# Patient Record
Sex: FEMALE | Race: WHITE | NOT HISPANIC OR LATINO | Employment: PART TIME | RURAL
[De-identification: names, ages, dates, MRNs, and addresses within clinical notes are randomized per-mention and may not be internally consistent; named-entity substitution may affect disease eponyms.]

---

## 2017-11-10 ENCOUNTER — OFFICE VISIT (OUTPATIENT)
Dept: OTOLARYNGOLOGY | Facility: CLINIC | Age: 62
End: 2017-11-10

## 2017-11-10 VITALS — WEIGHT: 130 LBS | HEIGHT: 63 IN | BODY MASS INDEX: 23.04 KG/M2

## 2017-11-10 DIAGNOSIS — J31.0 CHRONIC RHINITIS, UNSPECIFIED TYPE: ICD-10-CM

## 2017-11-10 DIAGNOSIS — Z48.813 AFTERCARE FOLLOWING SURGERY OF THE RESPIRATORY SYSTEM: ICD-10-CM

## 2017-11-10 DIAGNOSIS — G44.221 CHRONIC TENSION-TYPE HEADACHE, INTRACTABLE: Primary | ICD-10-CM

## 2017-11-10 PROCEDURE — 99203 OFFICE O/P NEW LOW 30 MIN: CPT | Performed by: OTOLARYNGOLOGY

## 2017-11-10 PROCEDURE — 31233 NSL/SINS NDSC DX MAX SINUSC: CPT | Performed by: OTOLARYNGOLOGY

## 2017-11-10 RX ORDER — DULOXETIN HYDROCHLORIDE 30 MG/1
CAPSULE, DELAYED RELEASE ORAL
COMMUNITY
Start: 2017-08-20 | End: 2019-04-10

## 2017-11-10 RX ORDER — GABAPENTIN 800 MG/1
800 TABLET ORAL 3 TIMES DAILY
COMMUNITY
Start: 2017-11-09 | End: 2019-04-10

## 2017-11-10 RX ORDER — CONJUGATED ESTROGENS 0.62 MG/1
0.62 TABLET, FILM COATED ORAL DAILY
COMMUNITY
Start: 2017-11-09 | End: 2019-04-19 | Stop reason: SDUPTHER

## 2017-11-10 RX ORDER — OMEPRAZOLE 20 MG/1
CAPSULE, DELAYED RELEASE ORAL
COMMUNITY
Start: 2017-08-05 | End: 2018-02-21 | Stop reason: SDDI

## 2017-11-10 RX ORDER — ZOLPIDEM TARTRATE 10 MG/1
10 TABLET ORAL NIGHTLY PRN
COMMUNITY
Start: 2017-11-09 | End: 2019-04-19

## 2017-11-10 RX ORDER — LEVOTHYROXINE SODIUM 0.12 MG/1
125 TABLET ORAL DAILY
COMMUNITY
Start: 2017-11-09 | End: 2019-04-10

## 2017-11-10 RX ORDER — FUROSEMIDE 20 MG/1
20 TABLET ORAL DAILY
COMMUNITY
Start: 2017-09-05 | End: 2021-06-21

## 2017-11-10 RX ORDER — HYDROCODONE BITARTRATE AND ACETAMINOPHEN 10; 325 MG/1; MG/1
1 TABLET ORAL EVERY 4 HOURS PRN
Status: ON HOLD | COMMUNITY
Start: 2017-11-06 | End: 2020-06-23 | Stop reason: SDUPTHER

## 2017-11-10 NOTE — PROGRESS NOTES
"Subjective   Kathleen Milligan is a 62 y.o. female.   This is a consultation from Dr. Cassidy thompson  History of Present Illness     Patient states that she received a steroid injection in her hip approximately one month ago and got \"sick\" after that.  She states she had nausea and diarrhea, but also developed a pressure-type headache that she says has been continuous for 1 month.  She says she has pressure in her head radiating from her forehead back to her occiput and also around her eyes.  Feels like she has postnasal drainage but no rhinorrhea.  Has a history of previous sinus surgery by Dr. Brown apparently in 2009.  Tried to use a medicated nasal spray that was prescribed by Dr. thompson but she states this gave her nose bleeds so she is not currently using any nasal medicine.  Additionally reports that when she wakes up in the morning she has significant swelling primarily on the right side of her face.  She says this will take most of the day to go down but will eventually resolved by the afternoon and evening only to recur the next morning.  Has had cervical spine surgery on the right, apparently    The following portions of the patient's history were reviewed and updated as appropriate: allergies, current medications, past family history, past medical history, past social history, past surgical history and problem list.      Kathleen Milligan reports that she has been smoking.  She has never used smokeless tobacco.  Patient is a tobacco user and has been counseled for use of tobacco products    Family History   Problem Relation Age of Onset   • Heart disease Mother    • Thyroid disease Mother    • Cancer Father    • Heart disease Father        No Known Allergies      Current Outpatient Prescriptions:   •  DULoxetine (CYMBALTA) 30 MG capsule, , Disp: , Rfl:   •  furosemide (LASIX) 20 MG tablet, , Disp: , Rfl:   •  gabapentin (NEURONTIN) 800 MG tablet, , Disp: , Rfl:   •  HYDROcodone-acetaminophen (NORCO)  " MG per tablet, , Disp: , Rfl:   •  levothyroxine (SYNTHROID, LEVOTHROID) 125 MCG tablet, , Disp: , Rfl:   •  omeprazole (priLOSEC) 20 MG capsule, , Disp: , Rfl:   •  PREMARIN 0.625 MG tablet, , Disp: , Rfl:   •  zolpidem (AMBIEN) 10 MG tablet, , Disp: , Rfl:     No past medical history on file.      Review of Systems   Constitutional: Positive for appetite change and unexpected weight change.   HENT: Positive for trouble swallowing.    Gastrointestinal: Positive for abdominal pain and diarrhea.   Endocrine: Positive for cold intolerance.        Hot flashes   Genitourinary:        Nocturia   Musculoskeletal: Positive for arthralgias and back pain.        Leg pain   Skin:        Hair loss   Neurological: Positive for weakness, numbness and headaches.   Psychiatric/Behavioral:        Depression   All other systems reviewed and are negative.          Objective   Physical Exam  General: Well-developed well-nourished female in no acute distress.  Alert and oriented ×3. Head: Normocephalic. Face: Symmetrical strength and appearance, specifically no evidence of asymmetric swelling or edema noted. PERRL. EOMI. Voice:Strong. Speech:Fluent  Ears: External ears no deformity, canals no discharge, tympanic membranes intact clear and mobile bilaterally.  Nose: Nares show no discharge mass polyp or purulence.  Boggy mucosa is present.  No gross external deformity.  Septum: Midline  Oral cavity: Lips and gums without lesions.  Tongue and floor of mouth without lesions.  Parotid and submandibular ducts unobstructed.  No mucosal lesions on the buccal mucosa or vestibule of the mouth.  Pharynx: No erythema exudate mass or ulcer  Neck: No lymphadenopathy.  No thyromegaly.  Trachea and larynx midline.  No masses in the parotid or submandibular glands.  Healed surgical scar on the right neck    Nasal endoscopy is performed.  Darius-Synephrine and Xylocaine are instilled the nares.  0° scope is passed into the nose.  Boggy mucosa is seen but  no discharge or purulence.  There is evidence of previous surgery in the area of the maxillary sinus bilaterally and the antrostomies are patent without evidence of discharge.  The ethmoid area/middle meatal cleft shows no evidence of polyp or purulence.  No masses in the nasopharynx.  No purulent secretions are seen anywhere in the nose.    Assessment/Plan   Penny was seen today for sinus problem.    Diagnoses and all orders for this visit:    Chronic tension-type headache, intractable    Chronic rhinitis, unspecified type    Aftercare following surgery of the respiratory system        Plan: I explained to the patient that I do not think her headaches and pressure are related to her sinuses.  This sounds more like tension headache.  I suspect the edema that she experiences in her face is just positional.  Medically I've advise using nasal saline spray frequently which should help any residual nasal symptoms she does have an of also advised elevating her head while sleeping to try to minimize edema.  I told her to discuss her ongoing headaches further with Dr. thompson to see what further workup she might want to pursue.  I'll see her again as needed.  My thanks to Dr. thompson for this consultation

## 2018-02-21 ENCOUNTER — OFFICE VISIT (OUTPATIENT)
Dept: OTOLARYNGOLOGY | Facility: CLINIC | Age: 63
End: 2018-02-21

## 2018-02-21 VITALS
BODY MASS INDEX: 20.91 KG/M2 | HEIGHT: 63 IN | WEIGHT: 118 LBS | TEMPERATURE: 98 F | RESPIRATION RATE: 20 BRPM | DIASTOLIC BLOOD PRESSURE: 60 MMHG | SYSTOLIC BLOOD PRESSURE: 113 MMHG | HEART RATE: 64 BPM

## 2018-02-21 DIAGNOSIS — Z72.0 TOBACCO ABUSE DISORDER: ICD-10-CM

## 2018-02-21 DIAGNOSIS — H57.819 BROW PTOSIS: Primary | ICD-10-CM

## 2018-02-21 PROCEDURE — 4004F PT TOBACCO SCREEN RCVD TLK: CPT | Performed by: OTOLARYNGOLOGY

## 2018-02-21 PROCEDURE — 99214 OFFICE O/P EST MOD 30 MIN: CPT | Performed by: OTOLARYNGOLOGY

## 2018-02-21 RX ORDER — DEXAMETHASONE SODIUM PHOSPHATE 10 MG/ML
10 INJECTION INTRAMUSCULAR; INTRAVENOUS ONCE
Status: CANCELLED | OUTPATIENT
Start: 2018-02-21 | End: 2018-02-21

## 2018-02-21 RX ORDER — CLINDAMYCIN HYDROCHLORIDE 150 MG/1
150 CAPSULE ORAL 3 TIMES DAILY
COMMUNITY
End: 2018-03-13 | Stop reason: HOSPADM

## 2018-02-21 NOTE — PROGRESS NOTES
Chief Complaint   Patient presents with   • Blepharitis     drooping brows     Kathleen Milligan is a  63 y.o. female who complains of her bilateral brows drooping for the last 4-5 years. She states symptoms occur all the time and are associated with daily activities, driving and reading. She states symptoms are severe enough that they are causing difficulty performing daily activities, progressively worsening and worse as the day progresses. She states there are aggravating factors including being tired. She states there are alleviating factors including manually lifting brows. She states constantly trying to elevate her eyebrows causes her headaches.     A visual field test has been performed. The results are available in the chart. Visual field testing demonstrates a 30 degree improvement on the right and a 30 degree improvement on the left with taping.     Past Medical History:   Diagnosis Date   • Headache    • Hypothyroidism      Past Surgical History:   Procedure Laterality Date   • BACK SURGERY     •  SECTION     • HYSTERECTOMY     • NECK SURGERY     • SHOULDER SURGERY     • SINUS SURGERY     • SINUS SURGERY     • SPINE SURGERY     • WRIST SURGERY Right      Family History   Problem Relation Age of Onset   • Heart disease Mother    • Thyroid disease Mother    • Cancer Father    • Heart disease Father      Social History   Substance Use Topics   • Smoking status: Current Every Day Smoker   • Smokeless tobacco: Never Used   • Alcohol use No     Allergies:  Review of patient's allergies indicates no known allergies.    Current Outpatient Prescriptions:   •  clindamycin (CLEOCIN) 150 MG capsule, Take 150 mg by mouth 3 (Three) Times a Day., Disp: , Rfl:   •  DULoxetine (CYMBALTA) 30 MG capsule, , Disp: , Rfl:   •  furosemide (LASIX) 20 MG tablet, , Disp: , Rfl:   •  gabapentin (NEURONTIN) 800 MG tablet, , Disp: , Rfl:   •  HYDROcodone-acetaminophen (NORCO)  MG per tablet, , Disp: , Rfl:   •   "levothyroxine (SYNTHROID, LEVOTHROID) 125 MCG tablet, , Disp: , Rfl:   •  PREMARIN 0.625 MG tablet, , Disp: , Rfl:   •  zolpidem (AMBIEN) 10 MG tablet, , Disp: , Rfl:     Review of Systems   Constitutional: Negative for activity change, appetite change, chills, fatigue, fever and unexpected weight change.   HENT: Negative for congestion, dental problem, facial swelling, nosebleeds, trouble swallowing and voice change.    Eyes: Negative for discharge and redness.   Skin: Negative for color change, pallor and rash.   Neurological: Positive for headaches.   Hematological: Negative for adenopathy. Does not bruise/bleed easily.   All other systems reviewed and are negative.      /60  Pulse 64  Temp 98 °F (36.7 °C)  Resp 20  Ht 160 cm (63\")  Wt 53.5 kg (118 lb)  BMI 20.9 kg/m2  Physical Exam   Constitutional  She appears well-developed and well-nourished. No distress.     Eyes  Pupils are equal, round, and reactive to light. System normal.     General -             Right: Right eye is negative for discharge, clear conjunctiva and clear sclerae. Right eye extraocular movements are normal.             Left: Left eye is negative for discharge, clear conjunctiva and clear sclerae. Left eye extraocular movements are normal.     Eyelids -        Right: The right eyelid has incomplete eyelid opening. Right upper eyelid fat is normal. Right upper eyelid skin is negative for excess skin and pseudoptosis.        Left: The left eyelid has incomplete eyelid opening. Left upper eyelid fat is normal. Left upper eyelid skin is negative for excess skin and pseudoptosis.   Measurements:   Right Left Normal Range   Palpebral fissure vertical (mm)   12-14   Palpebral fissure horizontal (mm)    28-30   Visible pretarsal skin (mm)   3-6   Lash line to supratarsal crease (mm)   8-10   Lateral canthus (mm)   2-4   Globe to SHARON distance (mm)   15-17   MRD upper lid margin overlap (mm)   1-2   MRD limbus diameter (mm)   10-11   MRD " mid-pupil to lid distance (mm) 1 1 4-5      Right Left   Levator excursion excellent (10-15 mm) excellent (10-15 mm)   Snap test (seconds)     Pinch test (mm)     Schirmer's test (mm)     Visual field defect superior superior           Pulmonary/Chest  Effort normal and breath sounds normal. No respiratory distress.     Skin  Skin is warm and dry. No rash noted. No pallor.     Psychiatric  She has a normal mood and affect. Her behavior is normal. Judgment and thought content normal.     Forehead and Brow  Right brow position is low for medial and low for lateral. Left brow position is low for medial and low for lateral.     Brows are completely below the orbital rim.  No significant dermatochalasia.  Levator excursion is 10mm, but MRD-1 = 1.0 mm bilaterally.    HEENT: NCAT.  External auricles normal without EAC discharge.  Nose: No rhinnorhea.    Neuro: CN II-XII intact bilaterally.      MS: Normal gait and station.            Penny was seen today for blepharitis.    Diagnoses and all orders for this visit:    Brow ptosis    Tobacco abuse disorder        Due to severity of symptoms and symptoms causing headaches, I have offered bilateral brow lift. We have discussed endoscopic brow lift vs direct brow lift. Patient would like to proceed with direct brow lift. Risks, benefits, alternatives, and complications were discussed. She understands that she will have scars above the   Brows.  She is okay with this.    Discussed risks, benefits, alternatives, and possible complications of excision of the blepharoplasty.  Risks include, but are not limited too: bleeding, infection, hematoma, recurrence, need for additional procedures, flap failure, cosmetic deformity. Patient understands risks and would like to proceed with surgery.       QUALITY MEASURES    Body Mass Index Screening and Follow-Up Plan  Body mass index is 20.9 kg/(m^2).  Patient's BMI is within normal parameters. No follow-up required.    Tobacco Use:  Screening and Cessation Intervention  Smoking status: Current Every Day Smoker                                                   Packs/day: 0.00      Years: 0.00      Smokeless status: Never Used                        Smoking cessation information given in after visit summary.      Return for Postoperatively.    Jesús Garza MD  02/21/18  11:18 AM

## 2018-02-21 NOTE — PATIENT INSTRUCTIONS
##### TOBACCO CESSATION #####   IF YOU SMOKE OR USE TOBACCO PLEASE READ THE FOLLOWING:    Why is smoking bad for me?  Smoking increases the risk of heart disease, lung disease, vascular disease, stroke, and cancer.     If you smoke, STOP!    If you would like more information on quitting smoking, please visit the [employer] website: [email or web address]   This link will provide additional resources including the QUIT line and the Beat the Pack support groups.     For more information:    Quit Now [place]  1-800-QUIT-NOW  [email or web address]

## 2018-03-08 ENCOUNTER — APPOINTMENT (OUTPATIENT)
Dept: PREADMISSION TESTING | Facility: HOSPITAL | Age: 63
End: 2018-03-08

## 2018-03-08 VITALS
SYSTOLIC BLOOD PRESSURE: 122 MMHG | OXYGEN SATURATION: 97 % | RESPIRATION RATE: 18 BRPM | WEIGHT: 120.15 LBS | DIASTOLIC BLOOD PRESSURE: 50 MMHG | BODY MASS INDEX: 21.29 KG/M2 | HEART RATE: 74 BPM | HEIGHT: 63 IN

## 2018-03-08 LAB
DEPRECATED RDW RBC AUTO: 39.9 FL (ref 40–54)
ERYTHROCYTE [DISTWIDTH] IN BLOOD BY AUTOMATED COUNT: 12.3 % (ref 12–15)
HCT VFR BLD AUTO: 39.3 % (ref 37–47)
HGB BLD-MCNC: 13.1 G/DL (ref 12–16)
MCH RBC QN AUTO: 29.4 PG (ref 28–32)
MCHC RBC AUTO-ENTMCNC: 33.3 G/DL (ref 33–36)
MCV RBC AUTO: 88.3 FL (ref 82–98)
PLATELET # BLD AUTO: 239 10*3/MM3 (ref 130–400)
PMV BLD AUTO: 12.2 FL (ref 6–12)
RBC # BLD AUTO: 4.45 10*6/MM3 (ref 4.2–5.4)
WBC NRBC COR # BLD: 11.32 10*3/MM3 (ref 4.8–10.8)

## 2018-03-08 PROCEDURE — 85027 COMPLETE CBC AUTOMATED: CPT | Performed by: OTOLARYNGOLOGY

## 2018-03-08 PROCEDURE — 36415 COLL VENOUS BLD VENIPUNCTURE: CPT

## 2018-03-08 PROCEDURE — 93010 ELECTROCARDIOGRAM REPORT: CPT | Performed by: INTERNAL MEDICINE

## 2018-03-08 PROCEDURE — 93005 ELECTROCARDIOGRAM TRACING: CPT

## 2018-03-08 RX ORDER — POTASSIUM GLUCONATE 595(99)MG
595 TABLET, EXTENDED RELEASE ORAL DAILY
COMMUNITY

## 2018-03-08 NOTE — DISCHARGE INSTRUCTIONS
DAY OF SURGERY INSTRUCTIONS        YOUR SURGEON: ***JONEL UNDERWOOD    PROCEDURE: ***BROW LIFT    DATE OF SURGERY: ***MARCH 13,2018    ARRIVAL TIME: AS DIRECTED BY OFFICE    DAY OF SURGERY TAKE ONLY THESE MEDICATIONS: ***NONE            BEFORE YOU COME TO THE HOSPITAL  (Pre-op instructions)  • Do not eat, drink, smoke or chew gum after midnight the night before surgery.  This also includes no mints.  • Morning of surgery take only the medicines you have been instructed with a sip of water unless otherwise instructed  by your physician.  • Do not shave, wear makeup or dark nail polish.  • Remove all jewelry including rings.  • Leave anything you consider valuable at home.  • Leave your suitcase in the car until after your surgery.  • Bring the following with you if applicable:  o Picture ID and insurance, Medicare or Medicaid cards  o Co-pay/deductible required by insurance (cash, check, credit card)  o Copy of advance directive, living will or power-of- documents if not brought to PAT  o CPAP or BIPAP mask and tubing  o Relaxation aids (MP3 player, book, magazine)  • On the day of surgery check in at registration located at the main entrance of the hospital.       Outpatient Surgery Guidelines, Adult  Outpatient procedures are those for which the person having the procedure is allowed to go home the same day as the procedure. Various procedures are done on an outpatient basis. You should follow some general guidelines if you will be having an outpatient procedure.  LET YOUR HEALTH CARE PROVIDER KNOW ABOUT:  · Any allergies you have.  · All medicines you are taking, including vitamins, herbs, eye drops, creams, and over-the-counter medicines.  · Previous problems you or members of your family have had with the use of anesthetics.  · Any blood disorders you have.  · Previous surgeries you have had.  · Medical conditions you have.  RISKS AND COMPLICATIONS  Your health care provider will discuss possible risks and  complications with you before surgery. Common risks and complications include:    · Problems due to the use of anesthetics.  · Blood loss and replacement (does not apply to minor surgical procedures).  · Temporary increase in pain due to surgery.  · Uncorrected pain or problems that the surgery was meant to correct.  · Infection.  · New damage.  BEFORE THE PROCEDURE  · Ask your health care provider about changing or stopping your regular medicines. You may need to stop taking certain medicines in the days or weeks before the procedure.  · Stop smoking at least 2 weeks before surgery. This lowers your risk for complications during and after surgery. Ask your health care provider for help with this if needed.  · Eat your usual meals and a light supper the day before surgery. Continue fluid intake. Do not drink alcohol.  · Do not eat or drink after midnight the night before your surgery.   · Arrange for someone to take you home and to stay with you for 24 hours after the procedure. Medicine given for your procedure may affect your ability to drive or to care for yourself.  · Call your health care provider's office if you develop an illness or problem that may prevent you from safely having your procedure.  AFTER THE PROCEDURE  After surgery, you will be taken to a recovery area, where your progress will be monitored. If there are no complications, you will be allowed to go home when you are awake, stable, and taking fluids well. You may have numbness around the surgical site. Healing will take some time. You will have tenderness at the surgical site and may have some swelling and bruising. You may also have some nausea.  HOME CARE INSTRUCTIONS  · Do not drive for 24 hours, or as directed by your health care provider. Do not drive while taking prescription pain medicines.  · Do not drink alcohol for 24 hours.  · Do not make important decisions or sign legal documents for 24 hours.  · You may resume a normal diet and  activities as directed.  · Do not lift anything heavier than 10 pounds (4.5 kg) or play contact sports until your health care provider says it is okay.  · Change your bandages (dressings) as directed.  · Only take over-the-counter or prescription medicines as directed by your health care provider.  · Follow up with your health care provider as directed.  SEEK MEDICAL CARE IF:  · You have increased bleeding (more than a small spot) from the surgical site.  · You have redness, swelling, or increasing pain in the wound.  · You see pus coming from the wound.  · You have a fever.  · You notice a bad smell coming from the wound or dressing.  · You feel lightheaded or faint.  · You develop a rash.  · You have trouble breathing.  · You develop allergies.  MAKE SURE YOU:  · Understand these instructions.  · Will watch your condition.  · Will get help right away if you are not doing well or get worse.     This information is not intended to replace advice given to you by your health care provider. Make sure you discuss any questions you have with your health care provider.     Document Released: 09/12/2002 Document Revised: 05/03/2016 Document Reviewed: 05/22/2014  [employer] Interactive Patient Education ©2016 [employer]       Fall Prevention in Hospitals, Adult  As a hospital patient, your condition and the treatments you receive can increase your risk for falls. Some additional risk factors for falls in a hospital include:  · Being in an unfamiliar environment.  · Being on bed rest.  · Your surgery.  · Taking certain medicines.  · Your tubing requirements, such as intravenous (IV) therapy or catheters.  It is important that you learn how to decrease fall risks while at the hospital. Below are important tips that can help prevent falls.  SAFETY TIPS FOR PREVENTING FALLS  Talk about your risk of falling.  · Ask your health care provider why you are at risk for falling. Is it your medicine, illness, tubing placement, or  something else?  · Make a plan with your health care provider to keep you safe from falls.  · Ask your health care provider or pharmacist about side effects of your medicines. Some medicines can make you dizzy or affect your coordination.  Ask for help.  · Ask for help before getting out of bed. You may need to press your call button.  · Ask for assistance in getting safely to the toilet.  · Ask for a walker or cane to be put at your bedside. Ask that most of the side rails on your bed be placed up before your health care provider leaves the room.  · Ask family or friends to sit with you.  · Ask for things that are out of your reach, such as your glasses, hearing aids, telephone, bedside table, or call button.  Follow these tips to avoid falling:  · Stay lying or seated, rather than standing, while waiting for help.  · Wear rubber-soled slippers or shoes whenever you walk in the hospital.  · Avoid quick, sudden movements.  ¨ Change positions slowly.  ¨ Sit on the side of your bed before standing.  ¨ Stand up slowly and wait before you start to walk.  · Let your health care provider know if there is a spill on the floor.  · Pay careful attention to the medical equipment, electrical cords, and tubes around you.  · When you need help, use your call button by your bed or in the bathroom. Wait for one of your health care providers to help you.  · If you feel dizzy or unsure of your footing, return to bed and wait for assistance.  · Avoid being distracted by the TV, telephone, or another person in your room.  · Do not lean or support yourself on rolling objects, such as IV poles or bedside tables.     This information is not intended to replace advice given to you by your health care provider. Make sure you discuss any questions you have with your health care provider.     Document Released: 12/15/2001 Document Revised: 01/08/2016 Document Reviewed: 08/25/2013  [employer]vier Interactive Patient Education ©2016 [employer]       Surgical Site Infections FAQs  What is a Surgical Site Infection (SSI)?  A surgical site infection is an infection that occurs after surgery in the part of the body where the surgery took place. Most patients who have surgery do not develop an infection. However, infections develop in about 1 to 3 out of every 100 patients who have surgery.  Some of the common symptoms of a surgical site infection are:  · Redness and pain around the area where you had surgery  · Drainage of cloudy fluid from your surgical wound  · Fever  Can SSIs be treated?  Yes. Most surgical site infections can be treated with antibiotics. The antibiotic given to you depends on the bacteria (germs) causing the infection. Sometimes patients with SSIs also need another surgery to treat the infection.  What are some of the things that hospitals are doing to prevent SSIs?  To prevent SSIs, doctors, nurses, and other healthcare providers:  · Clean their hands and arms up to their elbows with an antiseptic agent just before the surgery.  · Clean their hands with soap and water or an alcohol-based hand rub before and after caring for each patient.  · May remove some of your hair immediately before your surgery using electric clippers if the hair is in the same area where the procedure will occur. They should not shave you with a razor.  · Wear special hair covers, masks, gowns, and gloves during surgery to keep the surgery area clean.  · Give you antibiotics before your surgery starts. In most cases, you should get antibiotics within 60 minutes before the surgery starts and the antibiotics should be stopped within 24 hours after surgery.  · Clean the skin at the site of your surgery with a special soap that kills germs.  What can I do to help prevent SSIs?  Before your surgery:  · Tell your doctor about other medical problems you may have. Health problems such as allergies, diabetes, and obesity could affect your surgery and your  treatment.  · Quit smoking. Patients who smoke get more infections. Talk to your doctor about how you can quit before your surgery.  · Do not shave near where you will have surgery. Shaving with a razor can irritate your skin and make it easier to develop an infection.  At the time of your surgery:  · Speak up if someone tries to shave you with a razor before surgery. Ask why you need to be shaved and talk with your surgeon if you have any concerns.  · Ask if you will get antibiotics before surgery.  After your surgery:  · Make sure that your healthcare providers clean their hands before examining you, either with soap and water or an alcohol-based hand rub.  · If you do not see your providers clean their hands, please ask them to do so.  · Family and friends who visit you should not touch the surgical wound or dressings.  · Family and friends should clean their hands with soap and water or an alcohol-based hand rub before and after visiting you. If you do not see them clean their hands, ask them to clean their hands.  What do I need to do when I go home from the hospital?  · Before you go home, your doctor or nurse should explain everything you need to know about taking care of your wound. Make sure you understand how to care for your wound before you leave the hospital.  · Always clean your hands before and after caring for your wound.  · Before you go home, make sure you know who to contact if you have questions or problems after you get home.  · If you have any symptoms of an infection, such as redness and pain at the surgery site, drainage, or fever, call your doctor immediately.  If you have additional questions, please ask your doctor or nurse.  Developed and co-sponsored by The Society for Healthcare Epidemiology of [place] (SHEA); Infectious Diseases Society of [place] (IDSA); American Hospital Association; Association for Professionals in Infection Control and Epidemiology (APIC); Centers for Disease  Control and Prevention (CDC); and The Joint Commission.     This information is not intended to replace advice given to you by your health care provider. Make sure you discuss any questions you have with your health care provider.     Document Released: 12/23/2014 Document Revised: 01/08/2016 Document Reviewed: 03/02/2016  "[employer]" Interactive Patient Education ©2016 "[employer].     PATIENT/FAMILY/RESPONSIBLE PARTY VERBALIZES UNDERSTANDING OF ABOVE EDUCATION.  COPY OF PAIN SCALE GIVEN AND REVIEWED WITH VERBALIZED UNDERSTANDING.

## 2018-03-13 ENCOUNTER — HOSPITAL ENCOUNTER (OUTPATIENT)
Facility: HOSPITAL | Age: 63
Setting detail: HOSPITAL OUTPATIENT SURGERY
Discharge: HOME | End: 2018-03-13
Attending: OTOLARYNGOLOGY | Admitting: OTOLARYNGOLOGY

## 2018-03-13 ENCOUNTER — ANESTHESIA (OUTPATIENT)
Dept: PERIOP | Facility: HOSPITAL | Age: 63
End: 2018-03-13

## 2018-03-13 ENCOUNTER — ANESTHESIA EVENT (OUTPATIENT)
Dept: PERIOP | Facility: HOSPITAL | Age: 63
End: 2018-03-13

## 2018-03-13 VITALS
DIASTOLIC BLOOD PRESSURE: 46 MMHG | SYSTOLIC BLOOD PRESSURE: 106 MMHG | RESPIRATION RATE: 18 BRPM | OXYGEN SATURATION: 96 % | HEART RATE: 69 BPM | TEMPERATURE: 97.8 F

## 2018-03-13 DIAGNOSIS — H57.819 BROW PTOSIS: ICD-10-CM

## 2018-03-13 PROCEDURE — 25010000002 NEOSTIGMINE PER 0.5 MG: Performed by: NURSE ANESTHETIST, CERTIFIED REGISTERED

## 2018-03-13 PROCEDURE — 25010000002 FENTANYL CITRATE (PF) 100 MCG/2ML SOLUTION: Performed by: ANESTHESIOLOGY

## 2018-03-13 PROCEDURE — 25010000002 PROPOFOL 10 MG/ML EMULSION: Performed by: NURSE ANESTHETIST, CERTIFIED REGISTERED

## 2018-03-13 PROCEDURE — 25010000002 FENTANYL CITRATE (PF) 100 MCG/2ML SOLUTION: Performed by: NURSE ANESTHETIST, CERTIFIED REGISTERED

## 2018-03-13 PROCEDURE — 25010000002 DEXAMETHASONE PER 1 MG: Performed by: OTOLARYNGOLOGY

## 2018-03-13 PROCEDURE — 25010000002 MIDAZOLAM PER 1 MG: Performed by: ANESTHESIOLOGY

## 2018-03-13 PROCEDURE — 25010000002 ONDANSETRON PER 1 MG: Performed by: NURSE ANESTHETIST, CERTIFIED REGISTERED

## 2018-03-13 PROCEDURE — 67900 REPAIR BROW DEFECT: CPT | Performed by: OTOLARYNGOLOGY

## 2018-03-13 RX ORDER — IPRATROPIUM BROMIDE AND ALBUTEROL SULFATE 2.5; .5 MG/3ML; MG/3ML
3 SOLUTION RESPIRATORY (INHALATION) ONCE
Status: COMPLETED | OUTPATIENT
Start: 2018-03-13 | End: 2018-03-13

## 2018-03-13 RX ORDER — FENTANYL CITRATE 50 UG/ML
25 INJECTION, SOLUTION INTRAMUSCULAR; INTRAVENOUS
Status: DISCONTINUED | OUTPATIENT
Start: 2018-03-13 | End: 2018-03-13 | Stop reason: HOSPADM

## 2018-03-13 RX ORDER — IPRATROPIUM BROMIDE AND ALBUTEROL SULFATE 2.5; .5 MG/3ML; MG/3ML
3 SOLUTION RESPIRATORY (INHALATION) ONCE AS NEEDED
Status: DISCONTINUED | OUTPATIENT
Start: 2018-03-13 | End: 2018-03-13 | Stop reason: HOSPADM

## 2018-03-13 RX ORDER — DEXAMETHASONE SODIUM PHOSPHATE 4 MG/ML
4 INJECTION, SOLUTION INTRA-ARTICULAR; INTRALESIONAL; INTRAMUSCULAR; INTRAVENOUS; SOFT TISSUE ONCE AS NEEDED
Status: DISCONTINUED | OUTPATIENT
Start: 2018-03-13 | End: 2018-03-13 | Stop reason: HOSPADM

## 2018-03-13 RX ORDER — PHENYLEPHRINE HCL IN 0.9% NACL 0.8MG/10ML
SYRINGE (ML) INTRAVENOUS AS NEEDED
Status: DISCONTINUED | OUTPATIENT
Start: 2018-03-13 | End: 2018-03-13 | Stop reason: SURG

## 2018-03-13 RX ORDER — NALOXONE HCL 0.4 MG/ML
0.04 VIAL (ML) INJECTION AS NEEDED
Status: DISCONTINUED | OUTPATIENT
Start: 2018-03-13 | End: 2018-03-13 | Stop reason: HOSPADM

## 2018-03-13 RX ORDER — MIDAZOLAM HYDROCHLORIDE 1 MG/ML
2 INJECTION INTRAMUSCULAR; INTRAVENOUS
Status: DISCONTINUED | OUTPATIENT
Start: 2018-03-13 | End: 2018-03-13 | Stop reason: HOSPADM

## 2018-03-13 RX ORDER — LIDOCAINE HYDROCHLORIDE AND EPINEPHRINE 10; 10 MG/ML; UG/ML
INJECTION, SOLUTION INFILTRATION; PERINEURAL AS NEEDED
Status: DISCONTINUED | OUTPATIENT
Start: 2018-03-13 | End: 2018-03-13 | Stop reason: HOSPADM

## 2018-03-13 RX ORDER — ONDANSETRON 2 MG/ML
INJECTION INTRAMUSCULAR; INTRAVENOUS AS NEEDED
Status: DISCONTINUED | OUTPATIENT
Start: 2018-03-13 | End: 2018-03-13 | Stop reason: SURG

## 2018-03-13 RX ORDER — FENTANYL CITRATE 50 UG/ML
INJECTION, SOLUTION INTRAMUSCULAR; INTRAVENOUS AS NEEDED
Status: DISCONTINUED | OUTPATIENT
Start: 2018-03-13 | End: 2018-03-13 | Stop reason: SURG

## 2018-03-13 RX ORDER — ROCURONIUM BROMIDE 10 MG/ML
INJECTION, SOLUTION INTRAVENOUS AS NEEDED
Status: DISCONTINUED | OUTPATIENT
Start: 2018-03-13 | End: 2018-03-13 | Stop reason: SURG

## 2018-03-13 RX ORDER — SODIUM CHLORIDE, SODIUM LACTATE, POTASSIUM CHLORIDE, CALCIUM CHLORIDE 600; 310; 30; 20 MG/100ML; MG/100ML; MG/100ML; MG/100ML
100 INJECTION, SOLUTION INTRAVENOUS CONTINUOUS
Status: DISCONTINUED | OUTPATIENT
Start: 2018-03-13 | End: 2018-03-13 | Stop reason: HOSPADM

## 2018-03-13 RX ORDER — BACITRACIN ZINC 500 [USP'U]/G
OINTMENT TOPICAL AS NEEDED
Status: DISCONTINUED | OUTPATIENT
Start: 2018-03-13 | End: 2018-03-13 | Stop reason: HOSPADM

## 2018-03-13 RX ORDER — MAGNESIUM HYDROXIDE 1200 MG/15ML
LIQUID ORAL AS NEEDED
Status: DISCONTINUED | OUTPATIENT
Start: 2018-03-13 | End: 2018-03-13 | Stop reason: HOSPADM

## 2018-03-13 RX ORDER — SODIUM CHLORIDE, SODIUM LACTATE, POTASSIUM CHLORIDE, CALCIUM CHLORIDE 600; 310; 30; 20 MG/100ML; MG/100ML; MG/100ML; MG/100ML
1000 INJECTION, SOLUTION INTRAVENOUS CONTINUOUS
Status: DISCONTINUED | OUTPATIENT
Start: 2018-03-13 | End: 2018-03-13 | Stop reason: HOSPADM

## 2018-03-13 RX ORDER — FLUMAZENIL 0.1 MG/ML
0.2 INJECTION INTRAVENOUS AS NEEDED
Status: DISCONTINUED | OUTPATIENT
Start: 2018-03-13 | End: 2018-03-13 | Stop reason: HOSPADM

## 2018-03-13 RX ORDER — MORPHINE SULFATE 2 MG/ML
2 INJECTION, SOLUTION INTRAMUSCULAR; INTRAVENOUS AS NEEDED
Status: DISCONTINUED | OUTPATIENT
Start: 2018-03-13 | End: 2018-03-13 | Stop reason: HOSPADM

## 2018-03-13 RX ORDER — DEXAMETHASONE SODIUM PHOSPHATE 10 MG/ML
10 INJECTION INTRAMUSCULAR; INTRAVENOUS ONCE
Status: COMPLETED | OUTPATIENT
Start: 2018-03-13 | End: 2018-03-13

## 2018-03-13 RX ORDER — PROPOFOL 10 MG/ML
VIAL (ML) INTRAVENOUS AS NEEDED
Status: DISCONTINUED | OUTPATIENT
Start: 2018-03-13 | End: 2018-03-13 | Stop reason: SURG

## 2018-03-13 RX ORDER — MIDAZOLAM HYDROCHLORIDE 1 MG/ML
1 INJECTION INTRAMUSCULAR; INTRAVENOUS
Status: DISCONTINUED | OUTPATIENT
Start: 2018-03-13 | End: 2018-03-13 | Stop reason: HOSPADM

## 2018-03-13 RX ORDER — MEPERIDINE HYDROCHLORIDE 25 MG/ML
12.5 INJECTION INTRAMUSCULAR; INTRAVENOUS; SUBCUTANEOUS
Status: DISCONTINUED | OUTPATIENT
Start: 2018-03-13 | End: 2018-03-13 | Stop reason: HOSPADM

## 2018-03-13 RX ORDER — ONDANSETRON 2 MG/ML
4 INJECTION INTRAMUSCULAR; INTRAVENOUS AS NEEDED
Status: DISCONTINUED | OUTPATIENT
Start: 2018-03-13 | End: 2018-03-13 | Stop reason: HOSPADM

## 2018-03-13 RX ORDER — LIDOCAINE HYDROCHLORIDE 20 MG/ML
INJECTION, SOLUTION INFILTRATION; PERINEURAL AS NEEDED
Status: DISCONTINUED | OUTPATIENT
Start: 2018-03-13 | End: 2018-03-13 | Stop reason: SURG

## 2018-03-13 RX ORDER — GLYCOPYRROLATE 0.2 MG/ML
INJECTION INTRAMUSCULAR; INTRAVENOUS AS NEEDED
Status: DISCONTINUED | OUTPATIENT
Start: 2018-03-13 | End: 2018-03-13 | Stop reason: SURG

## 2018-03-13 RX ORDER — HYDRALAZINE HYDROCHLORIDE 20 MG/ML
5 INJECTION INTRAMUSCULAR; INTRAVENOUS
Status: DISCONTINUED | OUTPATIENT
Start: 2018-03-13 | End: 2018-03-13 | Stop reason: HOSPADM

## 2018-03-13 RX ORDER — OXYCODONE AND ACETAMINOPHEN 7.5; 325 MG/1; MG/1
2 TABLET ORAL ONCE AS NEEDED
Status: DISCONTINUED | OUTPATIENT
Start: 2018-03-13 | End: 2018-03-13 | Stop reason: HOSPADM

## 2018-03-13 RX ORDER — SODIUM CHLORIDE 0.9 % (FLUSH) 0.9 %
1-10 SYRINGE (ML) INJECTION AS NEEDED
Status: DISCONTINUED | OUTPATIENT
Start: 2018-03-13 | End: 2018-03-13 | Stop reason: HOSPADM

## 2018-03-13 RX ORDER — LIDOCAINE HYDROCHLORIDE 40 MG/ML
SOLUTION TOPICAL AS NEEDED
Status: DISCONTINUED | OUTPATIENT
Start: 2018-03-13 | End: 2018-03-13 | Stop reason: SURG

## 2018-03-13 RX ORDER — LABETALOL HYDROCHLORIDE 5 MG/ML
5 INJECTION, SOLUTION INTRAVENOUS
Status: DISCONTINUED | OUTPATIENT
Start: 2018-03-13 | End: 2018-03-13 | Stop reason: HOSPADM

## 2018-03-13 RX ORDER — METOCLOPRAMIDE HYDROCHLORIDE 5 MG/ML
5 INJECTION INTRAMUSCULAR; INTRAVENOUS
Status: DISCONTINUED | OUTPATIENT
Start: 2018-03-13 | End: 2018-03-13 | Stop reason: HOSPADM

## 2018-03-13 RX ORDER — SODIUM CHLORIDE 0.9 % (FLUSH) 0.9 %
3 SYRINGE (ML) INJECTION AS NEEDED
Status: DISCONTINUED | OUTPATIENT
Start: 2018-03-13 | End: 2018-03-13 | Stop reason: HOSPADM

## 2018-03-13 RX ADMIN — LIDOCAINE HYDROCHLORIDE 1 EACH: 40 SOLUTION TOPICAL at 10:04

## 2018-03-13 RX ADMIN — Medication 2 MG: at 11:15

## 2018-03-13 RX ADMIN — ONDANSETRON HYDROCHLORIDE 4 MG: 2 SOLUTION INTRAMUSCULAR; INTRAVENOUS at 10:59

## 2018-03-13 RX ADMIN — Medication 2 G: at 10:09

## 2018-03-13 RX ADMIN — EPHEDRINE SULFATE 10 MG: 50 INJECTION INTRAMUSCULAR; INTRAVENOUS; SUBCUTANEOUS at 11:01

## 2018-03-13 RX ADMIN — ROCURONIUM BROMIDE 20 MG: 10 INJECTION INTRAVENOUS at 10:02

## 2018-03-13 RX ADMIN — SODIUM CHLORIDE, POTASSIUM CHLORIDE, SODIUM LACTATE AND CALCIUM CHLORIDE 1000 ML: 600; 310; 30; 20 INJECTION, SOLUTION INTRAVENOUS at 07:40

## 2018-03-13 RX ADMIN — Medication 100 MCG: at 10:13

## 2018-03-13 RX ADMIN — SODIUM CHLORIDE, POTASSIUM CHLORIDE, SODIUM LACTATE AND CALCIUM CHLORIDE: 600; 310; 30; 20 INJECTION, SOLUTION INTRAVENOUS at 11:05

## 2018-03-13 RX ADMIN — MIDAZOLAM HYDROCHLORIDE 2 MG: 1 INJECTION, SOLUTION INTRAMUSCULAR; INTRAVENOUS at 09:27

## 2018-03-13 RX ADMIN — DEXAMETHASONE SODIUM PHOSPHATE 10 MG: 10 INJECTION, SOLUTION INTRAMUSCULAR; INTRAVENOUS at 09:26

## 2018-03-13 RX ADMIN — LIDOCAINE HYDROCHLORIDE 40 MG: 20 INJECTION, SOLUTION INFILTRATION; PERINEURAL at 10:02

## 2018-03-13 RX ADMIN — FENTANYL CITRATE 25 MCG: 50 INJECTION, SOLUTION INTRAMUSCULAR; INTRAVENOUS at 10:59

## 2018-03-13 RX ADMIN — IPRATROPIUM BROMIDE AND ALBUTEROL SULFATE 3 ML: 2.5; .5 SOLUTION RESPIRATORY (INHALATION) at 09:28

## 2018-03-13 RX ADMIN — PROPOFOL 120 MG: 10 INJECTION, EMULSION INTRAVENOUS at 10:02

## 2018-03-13 RX ADMIN — LIDOCAINE HYDROCHLORIDE 0.5 ML: 10 INJECTION, SOLUTION EPIDURAL; INFILTRATION; INTRACAUDAL; PERINEURAL at 07:40

## 2018-03-13 RX ADMIN — FENTANYL CITRATE 25 MCG: 50 INJECTION, SOLUTION INTRAMUSCULAR; INTRAVENOUS at 11:05

## 2018-03-13 RX ADMIN — OXYCODONE HYDROCHLORIDE AND ACETAMINOPHEN 1 TABLET: 7.5; 325 TABLET ORAL at 11:50

## 2018-03-13 RX ADMIN — GLYCOPYRROLATE 0.4 MG: 0.2 INJECTION, SOLUTION INTRAMUSCULAR; INTRAVENOUS at 11:15

## 2018-03-13 RX ADMIN — FENTANYL CITRATE 25 MCG: 50 INJECTION, SOLUTION INTRAMUSCULAR; INTRAVENOUS at 09:27

## 2018-03-13 RX ADMIN — FENTANYL CITRATE 50 MCG: 50 INJECTION, SOLUTION INTRAMUSCULAR; INTRAVENOUS at 10:02

## 2018-03-13 NOTE — OP NOTE
Preprocedure diagnosis: 1. Bilateral brow ptosis    2.  Visual field obstruction       Post procedure diagnosis: 1. Bilateral brow ptosis    2.  Visual field obstruction    Procedures performed:       Bilateraldirect brow lift       Surgeon: Jesús Garza M.D.    Anesthesia: General with 4 cc of 1% lidocaine with 1:100,000 epinephrine    Specimen:  none     Complications: None    Disposition: Home    Implants: None    Indications and operative findings:  Patient has complaints of progressive bilateral, right greater than left eyebrow drooping with visual field obstruction.  She had preoperative visual field testing demonstrates a 30° improvement bilaterally with taping.  Her MRD-1 measurements were 1.0 mm bilaterally.  Due to her brow asymmetry, I removed 8 mm of supra-brow skin on the left and 14 mm on the right    Details: After patient verification and informed consent was obtained,  the patient's brows were marked for excision in the upright position in the preoperative area.  The patient was taken to the operating room and laid supine on the operative table.  After the induction of general endotracheal anesthesia the skin was infiltrated with 1% lidocaine of 1:100,000 epinephrine.  The skin was sterilely prepped with hibiclense and the patient was sterilely draped.    The left direct brow lift was performed.  A fusiform incision was created using a 15 blade in the supra-brow skin.  The skin was undermined in the subcutaneous plane and discarded.  Hemostasis was obtained with bipolar and bipolar cautery set at 12.  The skin was closed using deep 4-0 and 5-0 undyed Vicryl suture followed by simple 6-0 Prolene suture.    An identical procedures performed on the right.    Antibiotic ointment was placed to the incisions.    The patient was weaned from general endotracheal anesthesia traction of the PACU for recovery without complication.

## 2018-03-13 NOTE — DISCHARGE INSTRUCTIONS

## 2018-03-13 NOTE — ANESTHESIA POSTPROCEDURE EVALUATION
Patient: Kathleen Milligan    Procedure Summary     Date:  03/13/18 Room / Location:   [facility] 03 /  [facility]    Anesthesia Start:  0959 Anesthesia Stop:  1124    Procedure:  Direct brow lift (Bilateral Face) Diagnosis:       Brow ptosis      (Brow ptosis [L90.8])    Surgeon:  Jesús Garza MD Provider:  Nicola Maya CRNA    Anesthesia Type:  general ASA Status:  2          Anesthesia Type: general  Last vitals  BP   106/46 (03/13/18 1230)   Temp   97.8 °F (36.6 °C) (03/13/18 1140)   Pulse   69 (03/13/18 1230)   Resp   18 (03/13/18 1230)     SpO2   96 % (03/13/18 1230)     Post Anesthesia Care and Evaluation    Patient location during evaluation: PACU  Patient participation: complete - patient participated  Level of consciousness: awake and alert  Pain management: adequate  Airway patency: patent  Anesthetic complications: No anesthetic complications  PONV Status: none  Cardiovascular status: acceptable and hemodynamically stable  Respiratory status: acceptable  Hydration status: acceptable    Comments: Blood pressure 106/46, pulse 69, temperature 97.8 °F (36.6 °C), temperature source Temporal Artery , resp. rate 18, SpO2 96 %.    Patient discharged from PACU based upon Brandon score. Please see RN notes for further details

## 2018-03-13 NOTE — ANESTHESIA PROCEDURE NOTES
Airway  Urgency: elective    Airway not difficult    General Information and Staff    Patient location during procedure: OR  CRNA: FADIA IVAN    Indications and Patient Condition  Indications for airway management: airway protection    Preoxygenated: yes  Mask difficulty assessment: 1 - vent by mask    Final Airway Details  Final airway type: endotracheal airway      Successful airway: ETT  Cuffed: yes   Successful intubation technique: direct laryngoscopy  Endotracheal tube insertion site: oral  Blade: Us  Blade size: #2  ETT size: 7.0 mm  Cormack-Lehane Classification: grade I - full view of glottis  Measured from: gums  ETT to gums (cm): 21  Number of attempts at approach: 1

## 2018-03-13 NOTE — ANESTHESIA PREPROCEDURE EVALUATION
Anesthesia Evaluation     Patient summary reviewed and Nursing notes reviewed   no history of anesthetic complications:  NPO Solid Status: > 8 hours  NPO Liquid Status: > 8 hours           Airway   Mallampati: I  TM distance: >3 FB  Neck ROM: full  No difficulty expected  Dental    (+) upper dentures and lower dentures    Pulmonary     breath sounds clear to auscultation  (+) a smoker Current,   Cardiovascular   Exercise tolerance: poor (<4 METS)    ECG reviewed  Rhythm: regular  Rate: normal        Neuro/Psych  (+) headaches,     (-) seizures, TIA, CVA  GI/Hepatic/Renal/Endo    (+)  hiatal hernia,  hypothyroidism,   (-) no renal disease, diabetes    Musculoskeletal     Abdominal    Substance History      OB/GYN          Other   (+) arthritis                     Anesthesia Plan    ASA 2     general     intravenous induction   Anesthetic plan and risks discussed with patient.

## 2018-03-14 ENCOUNTER — TELEPHONE (OUTPATIENT)
Dept: OTOLARYNGOLOGY | Facility: CLINIC | Age: 63
End: 2018-03-14

## 2018-03-14 NOTE — TELEPHONE ENCOUNTER
Postop Phone Call:    Patient is doing well.  Questions addressed.  F/U as scheduled.    Jesús Garza MD

## 2018-03-21 ENCOUNTER — OFFICE VISIT (OUTPATIENT)
Dept: OTOLARYNGOLOGY | Facility: CLINIC | Age: 63
End: 2018-03-21

## 2018-03-21 VITALS
SYSTOLIC BLOOD PRESSURE: 101 MMHG | WEIGHT: 121 LBS | BODY MASS INDEX: 21.44 KG/M2 | TEMPERATURE: 96.9 F | HEIGHT: 63 IN | HEART RATE: 72 BPM | DIASTOLIC BLOOD PRESSURE: 56 MMHG

## 2018-03-21 DIAGNOSIS — G44.221 CHRONIC TENSION-TYPE HEADACHE, INTRACTABLE: ICD-10-CM

## 2018-03-21 DIAGNOSIS — Z72.0 TOBACCO ABUSE DISORDER: ICD-10-CM

## 2018-03-21 DIAGNOSIS — H57.819 BROW PTOSIS: Primary | ICD-10-CM

## 2018-03-21 PROCEDURE — 99024 POSTOP FOLLOW-UP VISIT: CPT | Performed by: OTOLARYNGOLOGY

## 2018-03-21 NOTE — PROGRESS NOTES
"Kathleen Milligan returns to the office following bilateral direct brow lift on 3/13/2018    SUBJECTIVE:  She is very pleased with the result.  She denies any fevers or chills or wound drainage.  She has not had a migraine since the surgery.  She does note some increased light sensitivity.    OBJECTIVE:  /56 (Patient Position: Sitting)   Pulse 72   Temp 96.9 °F (36.1 °C)   Ht 160 cm (63\")   Wt 54.9 kg (121 lb)   BMI 21.43 kg/m²   Incisions healing very well.  Sutures removed.  Excellent result so far    ASSESSMENT:  Penny was seen today for post-op.    Diagnoses and all orders for this visit:    Brow ptosis    Tobacco abuse disorder    Chronic tension-type headache, intractable        PLAN:   Protect the incisions from sunlight. Sunlight to the incisions will cause permanent pigmentation to the incision line and make the incision more noticeable. After the incision has reepithelialized, you may begin to use sunscreen with an SPF of 15 or greater    I discussed the use of  Vitamin E, Mederma, or a high-quality extra virgin olive oil to the incisions to optimize the end result. Apply topically twice daily, or as directed, to help optimize wound healing and decrease erythema.    F/U 3 months    QUALITY MEASURES    Body Mass Index Screening and Follow-Up Plan  Body mass index is 21.43 kg/m².      Tobacco Use: Screening and Cessation Intervention  Smoking status: Current Every Day Smoker                                                   Packs/day: 0.50      Years: 15.00     Smokeless tobacco: Never Used                              Jesús Garza MD   03/21/2018  1:31 PM    "

## 2018-03-21 NOTE — PATIENT INSTRUCTIONS
IF YOU SMOKE OR USE TOBACCO PLEASE READ THE FOLLOWING:    Why is smoking bad for me?  Smoking increases the risk of heart disease, lung disease, vascular disease, stroke, and cancer.     If you smoke, STOP!    If you would like more information on quitting smoking, please visit the [employer] website: [email or web address]   This link will provide additional resources including the QUIT line and the Beat the Pack support groups.     For more information:    Quit Now [facility]  1-800-QUIT-NOW  https://[email or web address].quitlogix.org/en-[email or web address]/

## 2019-04-08 PROBLEM — Z13.29 ENCOUNTER FOR SCREENING FOR ENDOCRINE DISORDER: Status: ACTIVE | Noted: 2019-04-08

## 2019-04-08 PROBLEM — Z00.00 ANNUAL PHYSICAL EXAM: Status: ACTIVE | Noted: 2019-04-08

## 2019-04-08 PROBLEM — Z12.4 ENCOUNTER FOR PAPANICOLAOU SMEAR OF CERVIX: Status: ACTIVE | Noted: 2019-04-08

## 2019-04-08 PROBLEM — Z12.31 SCREENING MAMMOGRAM, ENCOUNTER FOR: Status: ACTIVE | Noted: 2019-04-08

## 2019-04-08 PROBLEM — Z12.11 ENCOUNTER FOR SCREENING COLONOSCOPY: Status: ACTIVE | Noted: 2019-04-08

## 2019-04-08 PROBLEM — Z13.6 ENCOUNTER FOR SCREENING FOR CARDIOVASCULAR DISORDERS: Status: ACTIVE | Noted: 2019-04-08

## 2019-04-08 PROBLEM — Z13.1 ENCOUNTER FOR SCREENING FOR DIABETES MELLITUS: Status: ACTIVE | Noted: 2019-04-08

## 2019-04-08 PROBLEM — G89.29 OTHER CHRONIC PAIN: Status: ACTIVE | Noted: 2019-04-08

## 2019-04-08 PROBLEM — Z00.00 GENERAL MEDICAL EXAMINATION: Status: ACTIVE | Noted: 2019-04-08

## 2019-04-08 NOTE — PROGRESS NOTES
Subjective:  Penny Gtz is a 64 y.o. female who presents for       Patient Active Problem List   Diagnosis   • Brow ptosis   • Tobacco abuse disorder   • Encounter for Papanicolaou smear of cervix   • Encounter for screening colonoscopy   • Screening mammogram, encounter for   • Encounter for screening for diabetes mellitus   • Encounter for screening for endocrine disorder   • Annual physical exam   • General medical examination   • Encounter for screening for cardiovascular disorders   • Other chronic pain   • Vitamin D deficiency    • Insomnia           Current Outpatient Medications:   •  aspirin 81 MG chewable tablet, Chew 81 mg Daily., Disp: , Rfl:   •  fluorometholone (FML) 0.1 % ophthalmic suspension, Administer 0.1 drops to both eyes 2 (Two) Times a Day., Disp: , Rfl: 1  •  fluticasone (FLONASE) 50 MCG/ACT nasal spray, 2 sprays into the nostril(s) as directed by provider Daily., Disp: , Rfl:   •  furosemide (LASIX) 20 MG tablet, Take 20 mg by mouth Daily., Disp: , Rfl:   •  HM CETIRIZINE HCL 10 MG tablet, Take 10 mg by mouth Daily., Disp: , Rfl: 0  •  HYDROcodone-acetaminophen (NORCO)  MG per tablet, 2 tablets Every 4 (Four) Hours As Needed., Disp: , Rfl:   •  levothyroxine (SYNTHROID, LEVOTHROID) 112 MCG tablet, Take 112 mcg by mouth Daily., Disp: , Rfl: 0  •  montelukast (SINGULAIR) 10 MG tablet, Take 10 mg by mouth Daily., Disp: , Rfl: 0  •  Potassium Gluconate  MG tablet controlled-release, Take 595 mg by mouth Daily., Disp: , Rfl:   •  PREMARIN 0.625 MG tablet, Take 0.625 mg by mouth Daily., Disp: , Rfl:   •  zolpidem (AMBIEN) 10 MG tablet, Take 10 mg by mouth At Night As Needed., Disp: , Rfl:   •  hydrOXYzine pamoate (VISTARIL) 25 MG capsule, Take 1 capsule by mouth At Night As Needed for Itching., Disp: 30 capsule, Rfl: 0    Pt is 63 yo female who I am seeing for the first time. She is here to establish. She has a PMH of hypothryoidism, insomnia, depression, chronic pain,  allergic rhinitis, GERD  She is due for new labwork, mammogram, colonoscopy and pap smear. Pt has history of Hypothryoidism,  Depression.   She has a histor yof 13 surgeries including an implant in cervical spine. In 2017.  In [facility].  She is enrolled in Pain Management in [place] later this. It affects her left side.   She is taking Norco now 10 mg/325 mg every 6 hours PRN. She did see Pain Management in [place] with injections along with ablatio.   She has pain and numbness in left side. For allergies she takes zyrtec, flonase and singulair.  She sees a Cardiologist and she is on lasix PRN for swelling in legs. For hypothyroidism she takes synthroid.  She takes Ambien for insomnia for years >5 years. She has trouble sleeping at bedtime.  She has a sleep study with Dr. Murray today.  She is a former smoker >1ppd for >40 years. She is now using vaporizer.  No alcohol use. No illicit drugs. She is disabled now. She is working in a [employer] shop now       Thyroid Problem   Presents for initial visit. Patient reports no anxiety, cold intolerance, constipation, depressed mood, diaphoresis, diarrhea, dry skin, fatigue, hair loss, heat intolerance, hoarse voice, leg swelling, menstrual problem, nail problem, palpitations, tremors, visual change, weight gain or weight loss. The symptoms have been stable. Past treatments include nothing (synthroid ). The treatment provided mild relief.   Upper Extremity Issue   This is a chronic problem. The current episode started more than 1 year ago. The problem occurs constantly. The problem has been gradually worsening. Associated symptoms include arthralgias, joint swelling and myalgias. Pertinent negatives include no abdominal pain, anorexia, change in bowel habit, chest pain, chills, congestion, coughing, diaphoresis, fatigue, fever, headaches, nausea, neck pain, numbness, rash, sore throat, swollen glands, urinary symptoms, vertigo, visual change, vomiting  or weakness. Nothing aggravates the symptoms. She has tried nothing (Norco, cymbalta ) for the symptoms. The treatment provided no relief.        Review of Systems  Review of Systems   Constitutional: Negative for activity change, appetite change, chills, diaphoresis, fatigue, fever, weight gain and weight loss.   HENT: Negative for congestion, hoarse voice, postnasal drip, rhinorrhea, sinus pressure, sinus pain, sneezing, sore throat, trouble swallowing and voice change.    Respiratory: Negative for cough, choking, chest tightness, shortness of breath, wheezing and stridor.    Cardiovascular: Negative for chest pain and palpitations.   Gastrointestinal: Negative for abdominal pain, anorexia, change in bowel habit, constipation, diarrhea, nausea and vomiting.   Endocrine: Negative for cold intolerance and heat intolerance.   Genitourinary: Negative for menstrual problem.   Musculoskeletal: Positive for arthralgias, joint swelling and myalgias. Negative for neck pain.   Skin: Negative for rash.   Neurological: Negative for vertigo, tremors, weakness, numbness and headaches.   Psychiatric/Behavioral: Positive for behavioral problems and sleep disturbance. The patient is not nervous/anxious.        Patient Active Problem List   Diagnosis   • Brow ptosis   • Tobacco abuse disorder   • Encounter for Papanicolaou smear of cervix   • Encounter for screening colonoscopy   • Screening mammogram, encounter for   • Encounter for screening for diabetes mellitus   • Encounter for screening for endocrine disorder   • Annual physical exam   • General medical examination   • Encounter for screening for cardiovascular disorders   • Other chronic pain   • Vitamin D deficiency    • Insomnia     Past Surgical History:   Procedure Laterality Date   • BACK SURGERY     • BROW LIFT Bilateral 3/13/2018    Procedure: Direct brow lift;  Surgeon: Jesús Garza MD;  Location: [facility];  Service: ENT   •  SECTION     • HYSTERECTOMY    "  • NECK SURGERY     • SHOULDER SURGERY     • SINUS SURGERY     • SINUS SURGERY     • SPINE SURGERY     • WRIST SURGERY Right      Social History     Socioeconomic History   • Marital status: Single     Spouse name: Not on file   • Number of children: Not on file   • Years of education: Not on file   • Highest education level: Not on file   Tobacco Use   • Smoking status: Current Every Day Smoker     Packs/day: 0.50     Years: 15.00     Pack years: 7.50   • Smokeless tobacco: Never Used   • Tobacco comment: vape   Substance and Sexual Activity   • Alcohol use: No   • Sexual activity: Defer     Family History   Problem Relation Age of Onset   • Heart disease Mother    • Thyroid disease Mother    • Cancer Father    • Heart disease Father      No visits with results within 6 Month(s) from this visit.   Latest known visit with results is:   Appointment on 03/08/2018   Component Date Value Ref Range Status   • WBC 03/08/2018 11.32* 4.80 - 10.80 10*3/mm3 Final   • RBC 03/08/2018 4.45  4.20 - 5.40 10*6/mm3 Final   • Hemoglobin 03/08/2018 13.1  12.0 - 16.0 g/dL Final   • Hematocrit 03/08/2018 39.3  37.0 - 47.0 % Final   • MCV 03/08/2018 88.3  82.0 - 98.0 fL Final   • MCH 03/08/2018 29.4  28.0 - 32.0 pg Final   • MCHC 03/08/2018 33.3  33.0 - 36.0 g/dL Final   • RDW 03/08/2018 12.3  12.0 - 15.0 % Final   • RDW-SD 03/08/2018 39.9* 40.0 - 54.0 fl Final   • MPV 03/08/2018 12.2* 6.0 - 12.0 fL Final   • Platelets 03/08/2018 239  130 - 400 10*3/mm3 Final      No image results found.    @[facility] Hospital[facility]INDINGS@    There is no immunization history on file for this patient.    The following portions of the patient's history were reviewed and updated as appropriate: allergies, current medications, past family history, past medical history, past social history, past surgical history and problem list.        Physical Exam  /70   Pulse 60   Temp 97.9 °F (36.6 °C)   Ht 160 cm (63\")   Wt 60.1 kg (132 lb 9.6 oz)   SpO2 98%   BMI 23.49 kg/m² "     Physical Exam   Constitutional: She is oriented to person, place, and time. She appears well-developed and well-nourished.   HENT:   Head: Normocephalic and atraumatic.   Right Ear: External ear normal.   Eyes: Conjunctivae and EOM are normal. Pupils are equal, round, and reactive to light.   Neck: Normal range of motion. Neck supple.   Cardiovascular: Normal rate, regular rhythm and normal heart sounds.   No murmur heard.  Pulmonary/Chest: Effort normal and breath sounds normal. No respiratory distress.   Abdominal: Soft. Bowel sounds are normal. She exhibits no distension. There is no tenderness.   Musculoskeletal: Normal range of motion. She exhibits tenderness. She exhibits no edema or deformity.   Neurological: She is alert and oriented to person, place, and time. No cranial nerve deficit.   Skin: Skin is warm. No rash noted. She is not diaphoretic. No erythema. No pallor.   Psychiatric: She has a normal mood and affect. Her behavior is normal.   Nursing note and vitals reviewed.      Assessment/Plan   Problems Addressed this Visit        Digestive    Vitamin D deficiency     Relevant Orders    Vitamin D 25 Hydroxy       Nervous and Auditory    Other chronic pain    Relevant Orders    Ambulatory Referral to Neurology (Completed)       Other    Encounter for Papanicolaou smear of cervix    Encounter for screening colonoscopy    Screening mammogram, encounter for    Encounter for screening for diabetes mellitus    Encounter for screening for endocrine disorder    Annual physical exam    General medical examination    Relevant Orders    CBC Auto Differential    Comprehensive Metabolic Panel    Hemoglobin A1c    Lipid Panel    TSH    T4, Free    T3, Free    Vitamin D 25 Hydroxy    Vitamin B12    Encounter for screening for cardiovascular disorders    Insomnia      Other Visit Diagnoses     Numbness and tingling in left arm    -  Primary    Relevant Orders    Ambulatory Referral to Neurology (Completed)     History of neck surgery        Relevant Orders    Ambulatory Referral to Neurology (Completed)        -counseled pt about interaction of [place] and Ambien for >5 minutes.  Recommend tapering off Ambien from 10 to 5 mg daily for next month. Start on Vistaril 25 mg at bedtime PRN   -recommend labwork  -for pain in right upper arm - continue Norco PRN.  lidoderm patch.   -recommend Neurology referral to EMG study   -recommend colonoscopy screening  -annual physical exam today  -advised pt to be safe and call with any questions and concerns. All questions addressed today.  -=recheck in 1 month         This document has been electronically signed by Francesco Zaidi MD on April 10, 2019 9:14 AM                    This document has been electronically signed by Francesco Zaidi MD on April 10, 2019 9:14 AM

## 2019-04-10 ENCOUNTER — OFFICE VISIT (OUTPATIENT)
Dept: FAMILY MEDICINE CLINIC | Facility: CLINIC | Age: 64
End: 2019-04-10

## 2019-04-10 ENCOUNTER — LAB (OUTPATIENT)
Dept: LAB | Facility: HOSPITAL | Age: 64
End: 2019-04-10

## 2019-04-10 VITALS
BODY MASS INDEX: 23.5 KG/M2 | TEMPERATURE: 97.9 F | WEIGHT: 132.6 LBS | OXYGEN SATURATION: 98 % | DIASTOLIC BLOOD PRESSURE: 70 MMHG | HEART RATE: 60 BPM | SYSTOLIC BLOOD PRESSURE: 110 MMHG | HEIGHT: 63 IN

## 2019-04-10 DIAGNOSIS — R20.2 NUMBNESS AND TINGLING IN LEFT ARM: Primary | ICD-10-CM

## 2019-04-10 DIAGNOSIS — Z13.6 ENCOUNTER FOR SCREENING FOR CARDIOVASCULAR DISORDERS: ICD-10-CM

## 2019-04-10 DIAGNOSIS — Z00.00 ANNUAL PHYSICAL EXAM: ICD-10-CM

## 2019-04-10 DIAGNOSIS — Z13.29 ENCOUNTER FOR SCREENING FOR ENDOCRINE DISORDER: ICD-10-CM

## 2019-04-10 DIAGNOSIS — G89.29 OTHER CHRONIC PAIN: ICD-10-CM

## 2019-04-10 DIAGNOSIS — Z98.890 HISTORY OF NECK SURGERY: ICD-10-CM

## 2019-04-10 DIAGNOSIS — E55.9 VITAMIN D DEFICIENCY: ICD-10-CM

## 2019-04-10 DIAGNOSIS — Z12.31 SCREENING MAMMOGRAM, ENCOUNTER FOR: ICD-10-CM

## 2019-04-10 DIAGNOSIS — Z00.00 GENERAL MEDICAL EXAMINATION: ICD-10-CM

## 2019-04-10 DIAGNOSIS — R20.0 NUMBNESS AND TINGLING IN LEFT ARM: Primary | ICD-10-CM

## 2019-04-10 DIAGNOSIS — Z13.1 ENCOUNTER FOR SCREENING FOR DIABETES MELLITUS: ICD-10-CM

## 2019-04-10 DIAGNOSIS — G47.00 INSOMNIA, UNSPECIFIED TYPE: ICD-10-CM

## 2019-04-10 DIAGNOSIS — Z12.11 ENCOUNTER FOR SCREENING COLONOSCOPY: ICD-10-CM

## 2019-04-10 DIAGNOSIS — Z12.4 ENCOUNTER FOR PAPANICOLAOU SMEAR OF CERVIX: ICD-10-CM

## 2019-04-10 LAB
25(OH)D3 SERPL-MCNC: 56.1 NG/ML (ref 30–100)
ALBUMIN SERPL-MCNC: 4.3 G/DL (ref 3.5–5.2)
ALBUMIN/GLOB SERPL: 1.9 G/DL
ALP SERPL-CCNC: 113 U/L (ref 39–117)
ALT SERPL W P-5'-P-CCNC: 25 U/L (ref 1–33)
ANION GAP SERPL CALCULATED.3IONS-SCNC: 10 MMOL/L
AST SERPL-CCNC: 21 U/L (ref 1–32)
BASOPHILS # BLD AUTO: 0.05 10*3/MM3 (ref 0–0.2)
BASOPHILS NFR BLD AUTO: 0.6 % (ref 0–1.5)
BILIRUB SERPL-MCNC: 0.6 MG/DL (ref 0.2–1.2)
BUN BLD-MCNC: 16 MG/DL (ref 8–23)
BUN/CREAT SERPL: 20.3 (ref 7–25)
CALCIUM SPEC-SCNC: 9.2 MG/DL (ref 8.6–10.5)
CHLORIDE SERPL-SCNC: 100 MMOL/L (ref 98–107)
CHOLEST SERPL-MCNC: 209 MG/DL (ref 0–200)
CO2 SERPL-SCNC: 30 MMOL/L (ref 22–29)
CREAT BLD-MCNC: 0.79 MG/DL (ref 0.57–1)
DEPRECATED RDW RBC AUTO: 44.4 FL (ref 37–54)
EOSINOPHIL # BLD AUTO: 0.11 10*3/MM3 (ref 0–0.4)
EOSINOPHIL NFR BLD AUTO: 1.4 % (ref 0.3–6.2)
ERYTHROCYTE [DISTWIDTH] IN BLOOD BY AUTOMATED COUNT: 12.7 % (ref 12.3–15.4)
GFR SERPL CREATININE-BSD FRML MDRD: 73 ML/MIN/1.73
GLOBULIN UR ELPH-MCNC: 2.3 GM/DL
GLUCOSE BLD-MCNC: 83 MG/DL (ref 65–99)
HBA1C MFR BLD: 5.8 % (ref 4.8–5.6)
HCT VFR BLD AUTO: 39.9 % (ref 34–46.6)
HDLC SERPL-MCNC: 71 MG/DL (ref 40–60)
HGB BLD-MCNC: 12.6 G/DL (ref 12–15.9)
IMM GRANULOCYTES # BLD AUTO: 0.02 10*3/MM3 (ref 0–0.05)
IMM GRANULOCYTES NFR BLD AUTO: 0.3 % (ref 0–0.5)
LDLC SERPL CALC-MCNC: 98 MG/DL (ref 0–100)
LDLC/HDLC SERPL: 1.38 {RATIO}
LYMPHOCYTES # BLD AUTO: 2.4 10*3/MM3 (ref 0.7–3.1)
LYMPHOCYTES NFR BLD AUTO: 31.1 % (ref 19.6–45.3)
MCH RBC QN AUTO: 30.2 PG (ref 26.6–33)
MCHC RBC AUTO-ENTMCNC: 31.6 G/DL (ref 31.5–35.7)
MCV RBC AUTO: 95.7 FL (ref 79–97)
MONOCYTES # BLD AUTO: 0.79 10*3/MM3 (ref 0.1–0.9)
MONOCYTES NFR BLD AUTO: 10.2 % (ref 5–12)
NEUTROPHILS # BLD AUTO: 4.35 10*3/MM3 (ref 1.4–7)
NEUTROPHILS NFR BLD AUTO: 56.4 % (ref 42.7–76)
NRBC BLD AUTO-RTO: 0 /100 WBC (ref 0–0)
PLATELET # BLD AUTO: 212 10*3/MM3 (ref 140–450)
PMV BLD AUTO: 12.8 FL (ref 6–12)
POTASSIUM BLD-SCNC: 4.4 MMOL/L (ref 3.5–5.2)
PROT SERPL-MCNC: 6.6 G/DL (ref 6–8.5)
RBC # BLD AUTO: 4.17 10*6/MM3 (ref 3.77–5.28)
SODIUM BLD-SCNC: 140 MMOL/L (ref 136–145)
T3FREE SERPL-MCNC: 3.44 PG/ML (ref 2–4.4)
T4 FREE SERPL-MCNC: 1.86 NG/DL (ref 0.93–1.7)
TRIGL SERPL-MCNC: 200 MG/DL (ref 0–150)
TSH SERPL DL<=0.05 MIU/L-ACNC: 0.01 MIU/ML (ref 0.27–4.2)
VIT B12 BLD-MCNC: 270 PG/ML (ref 211–946)
VLDLC SERPL-MCNC: 40 MG/DL (ref 5–40)
WBC NRBC COR # BLD: 7.72 10*3/MM3 (ref 3.4–10.8)

## 2019-04-10 PROCEDURE — 84439 ASSAY OF FREE THYROXINE: CPT

## 2019-04-10 PROCEDURE — 80053 COMPREHEN METABOLIC PANEL: CPT

## 2019-04-10 PROCEDURE — 83036 HEMOGLOBIN GLYCOSYLATED A1C: CPT

## 2019-04-10 PROCEDURE — 82607 VITAMIN B-12: CPT

## 2019-04-10 PROCEDURE — 80061 LIPID PANEL: CPT

## 2019-04-10 PROCEDURE — 85025 COMPLETE CBC W/AUTO DIFF WBC: CPT

## 2019-04-10 PROCEDURE — 84481 FREE ASSAY (FT-3): CPT

## 2019-04-10 PROCEDURE — 99214 OFFICE O/P EST MOD 30 MIN: CPT | Performed by: FAMILY MEDICINE

## 2019-04-10 PROCEDURE — 84443 ASSAY THYROID STIM HORMONE: CPT

## 2019-04-10 PROCEDURE — 82306 VITAMIN D 25 HYDROXY: CPT

## 2019-04-10 RX ORDER — FLUOROMETHOLONE 0.1 %
1 SUSPENSION, DROPS(FINAL DOSAGE FORM)(ML) OPHTHALMIC (EYE) 2 TIMES DAILY
Refills: 1 | COMMUNITY
Start: 2019-02-14

## 2019-04-10 RX ORDER — CETIRIZINE HYDROCHLORIDE TABLETS 10 MG/1
10 TABLET, FILM COATED ORAL DAILY
Refills: 0 | COMMUNITY
Start: 2019-03-21

## 2019-04-10 RX ORDER — LEVOTHYROXINE SODIUM 112 UG/1
112 TABLET ORAL DAILY
Refills: 0 | COMMUNITY
Start: 2019-02-13 | End: 2019-04-22 | Stop reason: SDUPTHER

## 2019-04-10 RX ORDER — LIDOCAINE 50 MG/G
1 PATCH TOPICAL EVERY 24 HOURS
Qty: 30 PATCH | Refills: 3 | Status: SHIPPED | OUTPATIENT
Start: 2019-04-10 | End: 2021-09-15 | Stop reason: ALTCHOICE

## 2019-04-10 RX ORDER — OMEPRAZOLE 20 MG/1
20 CAPSULE, DELAYED RELEASE ORAL DAILY
Refills: 10 | COMMUNITY
Start: 2019-03-21 | End: 2019-04-10

## 2019-04-10 RX ORDER — MONTELUKAST SODIUM 10 MG/1
10 TABLET ORAL DAILY
Refills: 0 | COMMUNITY
Start: 2019-02-13

## 2019-04-10 RX ORDER — ASPIRIN 81 MG/1
81 TABLET, CHEWABLE ORAL DAILY
COMMUNITY

## 2019-04-10 RX ORDER — HYDROXYZINE PAMOATE 25 MG/1
25 CAPSULE ORAL NIGHTLY PRN
Qty: 30 CAPSULE | Refills: 0 | Status: SHIPPED | OUTPATIENT
Start: 2019-04-10 | End: 2020-06-12

## 2019-04-10 RX ORDER — TAMSULOSIN HYDROCHLORIDE 0.4 MG/1
1 CAPSULE ORAL DAILY
Refills: 0 | COMMUNITY
Start: 2019-01-16 | End: 2019-04-10

## 2019-04-10 RX ORDER — FLUTICASONE PROPIONATE 50 MCG
2 SPRAY, SUSPENSION (ML) NASAL DAILY
COMMUNITY
End: 2020-06-12

## 2019-04-10 NOTE — PATIENT INSTRUCTIONS
STart taking 1/2 tablet of ambien 10 mg  Or 5 mg for next month    Start on vistaril 25 mg at bedtime as needed for sleep Hydroxyzine capsules or tablets  What is this medicine?  HYDROXYZINE (viki DROX i zeen) is an antihistamine. This medicine is used to treat allergy symptoms. It is also used to treat anxiety and tension. This medicine can be used with other medicines to induce sleep before surgery.  This medicine may be used for other purposes; ask your health care provider or pharmacist if you have questions.  COMMON BRAND NAME(S): ANX, Atarax, Rezine, Vistaril  What should I tell my health care provider before I take this medicine?  They need to know if you have any of these conditions:  -any chronic illness  -difficulty passing urine  -glaucoma  -heart disease  -kidney disease  -liver disease  -lung disease  -an unusual or allergic reaction to hydroxyzine, cetirizine, other medicines, foods, dyes, or preservatives  -pregnant or trying to get pregnant  -breast-feeding  How should I use this medicine?  Take this medicine by mouth with a full glass of water. Follow the directions on the prescription label. You may take this medicine with food or on an empty stomach. Take your medicine at regular intervals. Do not take your medicine more often than directed.  Talk to your pediatrician regarding the use of this medicine in children. Special care may be needed. While this drug may be prescribed for children as young as 6 years of age for selected conditions, precautions do apply.  Patients over 65 years old may have a stronger reaction and need a smaller dose.  Overdosage: If you think you have taken too much of this medicine contact a poison control center or emergency room at once.  NOTE: This medicine is only for you. Do not share this medicine with others.  What if I miss a dose?  If you miss a dose, take it as soon as you can. If it is almost time for your next dose, take only that dose. Do not take double or  extra doses.  What may interact with this medicine?  -alcohol  -barbiturate medicines for sleep or seizures  -medicines for colds, allergies  -medicines for depression, anxiety, or emotional disturbances  -medicines for pain  -medicines for sleep  -muscle relaxants  This list may not describe all possible interactions. Give your health care provider a list of all the medicines, herbs, non-prescription drugs, or dietary supplements you use. Also tell them if you smoke, drink alcohol, or use illegal drugs. Some items may interact with your medicine.  What should I watch for while using this medicine?  Tell your doctor or health care professional if your symptoms do not improve.  You may get drowsy or dizzy. Do not drive, use machinery, or do anything that needs mental alertness until you know how this medicine affects you. Do not stand or sit up quickly, especially if you are an older patient. This reduces the risk of dizzy or fainting spells. Alcohol may interfere with the effect of this medicine. Avoid alcoholic drinks.  Your mouth may get dry. Chewing sugarless gum or sucking hard candy, and drinking plenty of water may help. Contact your doctor if the problem does not go away or is severe.  This medicine may cause dry eyes and blurred vision. If you wear contact lenses you may feel some discomfort. Lubricating drops may help. See your eye doctor if the problem does not go away or is severe.  If you are receiving skin tests for allergies, tell your doctor you are using this medicine.  What side effects may I notice from receiving this medicine?  Side effects that you should report to your doctor or health care professional as soon as possible:  -fast or irregular heartbeat  -difficulty passing urine  -seizures  -slurred speech or confusion  -tremor  Side effects that usually do not require medical attention (report to your doctor or health care professional if they continue or are  bothersome):  -constipation  -drowsiness  -fatigue  -headache  -stomach upset  This list may not describe all possible side effects. Call your doctor for medical advice about side effects. You may report side effects to FDA at 9-549-QMU-6715.  Where should I keep my medicine?  Keep out of the reach of children.  Store at room temperature between 15 and 30 degrees C (59 and 86 degrees F). Keep container tightly closed. Throw away any unused medicine after the expiration date.  NOTE: This sheet is a summary. It may not cover all possible information. If you have questions about this medicine, talk to your doctor, pharmacist, or health care provider.  © 2019 [employer]/Gold Standard (2009-05-01 14:50:59)

## 2019-04-18 ENCOUNTER — TELEPHONE (OUTPATIENT)
Dept: FAMILY MEDICINE CLINIC | Facility: CLINIC | Age: 64
End: 2019-04-18

## 2019-04-19 ENCOUNTER — LAB (OUTPATIENT)
Dept: LAB | Facility: HOSPITAL | Age: 64
End: 2019-04-19

## 2019-04-19 DIAGNOSIS — G89.29 OTHER CHRONIC PAIN: ICD-10-CM

## 2019-04-19 DIAGNOSIS — Z02.83 ENCOUNTER FOR DRUG SCREENING: Primary | ICD-10-CM

## 2019-04-19 DIAGNOSIS — Z02.83 ENCOUNTER FOR DRUG SCREENING: ICD-10-CM

## 2019-04-19 PROCEDURE — 80307 DRUG TEST PRSMV CHEM ANLYZR: CPT

## 2019-04-19 RX ORDER — GABAPENTIN 800 MG/1
800 TABLET ORAL 3 TIMES DAILY
Qty: 90 TABLET | Refills: 2 | Status: SHIPPED | OUTPATIENT
Start: 2019-04-19 | End: 2021-09-30

## 2019-04-19 RX ORDER — ZOLPIDEM TARTRATE 10 MG/1
5 TABLET ORAL NIGHTLY
Qty: 30 TABLET | Refills: 0 | OUTPATIENT
Start: 2019-04-19

## 2019-04-19 RX ORDER — GABAPENTIN 800 MG/1
800 TABLET ORAL 3 TIMES DAILY
Qty: 90 TABLET | Refills: 0 | OUTPATIENT
Start: 2019-04-19

## 2019-04-19 RX ORDER — ZOLPIDEM TARTRATE 5 MG/1
5 TABLET ORAL NIGHTLY PRN
Qty: 30 TABLET | Refills: 0 | Status: SHIPPED | OUTPATIENT
Start: 2019-04-19 | End: 2021-09-15 | Stop reason: DRUGHIGH

## 2019-04-19 RX ORDER — CONJUGATED ESTROGENS 0.62 MG/1
0.62 TABLET, FILM COATED ORAL DAILY
Qty: 30 TABLET | Refills: 3 | OUTPATIENT
Start: 2019-04-19

## 2019-04-19 RX ORDER — CONJUGATED ESTROGENS 0.62 MG/1
0.62 TABLET, FILM COATED ORAL DAILY
Qty: 90 TABLET | Refills: 3 | Status: SHIPPED | OUTPATIENT
Start: 2019-04-19 | End: 2021-09-15

## 2019-04-19 NOTE — TELEPHONE ENCOUNTER
Ms. Gtz is needing refills on her gabapentin sent to [facility]. She is needing refills on her PREMARIN 0.625 MG tablet, zolpidem (AMBIEN) 10 MG tablet sent [facility] as well. Pt phone [phone]

## 2019-04-20 LAB
AMPHET+METHAMPHET UR QL: NEGATIVE
BARBITURATES UR QL SCN: NEGATIVE
BENZODIAZ UR QL SCN: NEGATIVE
CANNABINOIDS SERPL QL: NEGATIVE
COCAINE UR QL: NEGATIVE
METHADONE UR QL SCN: NEGATIVE
OPIATES UR QL: POSITIVE
OXYCODONE UR QL SCN: NEGATIVE

## 2019-04-22 ENCOUNTER — TELEPHONE (OUTPATIENT)
Dept: FAMILY MEDICINE CLINIC | Facility: CLINIC | Age: 64
End: 2019-04-22

## 2019-04-22 DIAGNOSIS — I65.23 BILATERAL CAROTID ARTERY STENOSIS: Primary | ICD-10-CM

## 2019-04-22 RX ORDER — LEVOTHYROXINE SODIUM 0.1 MG/1
100 TABLET ORAL DAILY
Qty: 30 TABLET | Refills: 3 | Status: SHIPPED | OUTPATIENT
Start: 2019-04-22 | End: 2021-09-30

## 2019-04-22 RX ORDER — ATORVASTATIN CALCIUM 20 MG/1
20 TABLET, FILM COATED ORAL DAILY
Qty: 30 TABLET | Refills: 3 | Status: SHIPPED | OUTPATIENT
Start: 2019-04-22 | End: 2019-08-16 | Stop reason: SDUPTHER

## 2019-04-22 NOTE — TELEPHONE ENCOUNTER
----- Message from Francesco Zaidi MD sent at 4/20/2019  8:36 AM CDT -----  Pt taking [place].  Will continue to follow  Called pt

## 2019-04-22 NOTE — TELEPHONE ENCOUNTER
----- Message from Francesco Zaidi MD sent at 4/20/2019  9:21 AM CDT -----  Call pt    Her vitamin B12 levels are normal    On thyroid studies her TSH is low and T4 is high. She is being overtreated. Stop synthroid 112 mcg daily and start on synthroid 100 mcg PO q daily Give 30 pills and 3 reflls     Vitamin D is normal     CBC is normal    Kidney and liver function normal    Total cholesterol is high and recommend pt start on lipitor 20 mg PO qhs. Give 30 pills and 3 refills. 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  Recommend [facility] referral in [place]. Let me know if pt is okay with this    Recheck on next visit. Thank  Called pt,changed dose,added med,please put in referral

## 2019-04-22 NOTE — TELEPHONE ENCOUNTER
Referred pt to Dr. [facility]    Filled medication    Have pt bring CD of carotid imaging to Vascular appt. She will need to pick this up.     Thanks

## 2019-04-24 ENCOUNTER — TELEPHONE (OUTPATIENT)
Dept: FAMILY MEDICINE CLINIC | Facility: CLINIC | Age: 64
End: 2019-04-24

## 2019-04-25 ENCOUNTER — TELEPHONE (OUTPATIENT)
Dept: FAMILY MEDICINE CLINIC | Facility: CLINIC | Age: 64
End: 2019-04-25

## 2019-04-25 RX ORDER — ESTRADIOL 1 MG/1
1 TABLET ORAL DAILY
Qty: 30 TABLET | Refills: 3 | Status: SHIPPED | OUTPATIENT
Start: 2019-04-25 | End: 2019-10-30 | Stop reason: SDUPTHER

## 2019-04-25 NOTE — TELEPHONE ENCOUNTER
Premarin 0.625mg not covered by insurance. Covered medications are estradiol, alendronate sodium, fluoxetine, estropipate, paroxetine, or venlafaxine. Has patient tried any of these medications that you are aware of prior to seeing you?

## 2019-04-25 NOTE — TELEPHONE ENCOUNTER
Have her try estradiol 1 mg daily for vasomotor symptoms. Can give 30 pills and 3 refills Thanks. Please fill and I will sign for it. Thanks

## 2019-04-29 ENCOUNTER — TELEPHONE (OUTPATIENT)
Dept: FAMILY MEDICINE CLINIC | Facility: CLINIC | Age: 64
End: 2019-04-29

## 2019-05-07 ENCOUNTER — TELEPHONE (OUTPATIENT)
Dept: FAMILY MEDICINE CLINIC | Facility: CLINIC | Age: 64
End: 2019-05-07

## 2019-05-09 ENCOUNTER — OFFICE VISIT (OUTPATIENT)
Dept: CARDIAC SURGERY | Facility: CLINIC | Age: 64
End: 2019-05-09

## 2019-05-09 VITALS
BODY MASS INDEX: 23.39 KG/M2 | WEIGHT: 132 LBS | TEMPERATURE: 97.8 F | SYSTOLIC BLOOD PRESSURE: 96 MMHG | OXYGEN SATURATION: 98 % | HEART RATE: 69 BPM | HEIGHT: 63 IN | DIASTOLIC BLOOD PRESSURE: 58 MMHG

## 2019-05-09 DIAGNOSIS — I65.23 BILATERAL CAROTID ARTERY STENOSIS: Primary | ICD-10-CM

## 2019-05-09 DIAGNOSIS — F17.218 NICOTINE DEPENDENCE, CIGARETTES, WITH OTHER NICOTINE-INDUCED DISORDERS: ICD-10-CM

## 2019-05-09 DIAGNOSIS — N18.2 CHRONIC KIDNEY DISEASE, STAGE 2 (MILD): ICD-10-CM

## 2019-05-09 DIAGNOSIS — E78.2 MIXED HYPERLIPIDEMIA: ICD-10-CM

## 2019-05-09 PROCEDURE — 99406 BEHAV CHNG SMOKING 3-10 MIN: CPT | Performed by: THORACIC SURGERY (CARDIOTHORACIC VASCULAR SURGERY)

## 2019-05-09 PROCEDURE — 99204 OFFICE O/P NEW MOD 45 MIN: CPT | Performed by: THORACIC SURGERY (CARDIOTHORACIC VASCULAR SURGERY)

## 2019-05-15 RX ORDER — ZOLPIDEM TARTRATE 5 MG/1
TABLET ORAL
Qty: 30 TABLET | OUTPATIENT
Start: 2019-05-15

## 2019-05-23 PROBLEM — E78.2 MIXED HYPERLIPIDEMIA: Status: ACTIVE | Noted: 2019-05-23

## 2019-05-23 PROBLEM — I65.23 BILATERAL CAROTID ARTERY STENOSIS: Status: ACTIVE | Noted: 2019-05-23

## 2019-05-23 PROBLEM — F17.218 NICOTINE DEPENDENCE, CIGARETTES, WITH OTHER NICOTINE-INDUCED DISORDERS: Status: ACTIVE | Noted: 2019-05-23

## 2019-05-23 PROBLEM — N18.2 CHRONIC KIDNEY DISEASE, STAGE 2 (MILD): Status: ACTIVE | Noted: 2019-05-23

## 2019-05-23 NOTE — PROGRESS NOTES
2019    Penny Gtz  1955    Chief Complaint:    Chief Complaint   Patient presents with   • Carotid Artery Disease       HPI:      PCP:  Francesco Zaidi MD  Cardiology:  Dr Kruse    64 y.o. female with Hyperlipidemia(stable, increased risk cardiovascular events), Smoker(uncontrolled, increased risk cardiovascular events), COPD(stable, increased risk pulmonary complications) and Chronic Kidney Disease(stable, increased risk renal failure) , carotid stenosis(new, increase risk stroke).  smokes vape PPD.  Asymptomatic carotid stenosis..  No TIA stroke amaurosis.  No MI claudication. No other associated signs, symptoms or modifying factors.    2018 Carotid Duplex([facility]):  Mild scattered plaque, no hemodynamically significant stenosis.    3/2018 ECG:  NSR 64, QTc 410    The following portions of the patient's history were reviewed and updated as appropriate: allergies, current medications, past family history, past medical history, past social history, past surgical history and problem list.  Recent images independently reviewed.  Available laboratory values reviewed.    PMH:  Past Medical History:   Diagnosis Date   • Arthritis    • Brow ptosis 2018   • COPD (chronic obstructive pulmonary disease) (CMS/HCC)    • Headache    • Hepatitis B carrier (CMS/HCC)    • Hiatal hernia    • History of transfusion    • Hypothyroidism    • Migraines    • Nerve damage     legs bilateral   • Visual field defect    • Vitiligo      Family History   Problem Relation Age of Onset   • Heart disease Mother    • Thyroid disease Mother    • Cancer Father    • Heart disease Father      Social History     Tobacco Use   • Smoking status: Former Smoker     Packs/day: 0.50     Years: 48.00     Pack years: 24.00     Types: Cigarettes     Last attempt to quit: 2019     Years since quittin.2   • Smokeless tobacco: Current User   • Tobacco comment: vape   Substance Use Topics   • Alcohol use: No   • Drug use: Not on file        ALLERGIES:  No Known Allergies      MEDICATIONS:    Current Outpatient Medications:   •  aspirin 81 MG chewable tablet, Chew 81 mg Daily., Disp: , Rfl:   •  atorvastatin (LIPITOR) 20 MG tablet, Take 1 tablet by mouth Daily., Disp: 30 tablet, Rfl: 3  •  estradiol (ESTRACE) 1 MG tablet, Take 1 tablet by mouth Daily., Disp: 30 tablet, Rfl: 3  •  fluorometholone (FML) 0.1 % ophthalmic suspension, Administer 0.1 drops to both eyes 2 (Two) Times a Day., Disp: , Rfl: 1  •  fluticasone (FLONASE) 50 MCG/ACT nasal spray, 2 sprays into the nostril(s) as directed by provider Daily., Disp: , Rfl:   •  furosemide (LASIX) 20 MG tablet, Take 20 mg by mouth Daily., Disp: , Rfl:   •  gabapentin (NEURONTIN) 800 MG tablet, Take 1 tablet by mouth 3 (Three) Times a Day., Disp: 90 tablet, Rfl: 2  •  HM CETIRIZINE HCL 10 MG tablet, Take 10 mg by mouth Daily., Disp: , Rfl: 0  •  HYDROcodone-acetaminophen (NORCO)  MG per tablet, 2 tablets Every 4 (Four) Hours As Needed., Disp: , Rfl:   •  hydrOXYzine pamoate (VISTARIL) 25 MG capsule, Take 1 capsule by mouth At Night As Needed for Itching., Disp: 30 capsule, Rfl: 0  •  levothyroxine (SYNTHROID, LEVOTHROID) 100 MCG tablet, Take 1 tablet by mouth Daily., Disp: 30 tablet, Rfl: 3  •  lidocaine (LIDODERM) 5 %, Place 1 patch on the skin as directed by provider Daily. Remove & Discard patch within 12 hours or as directed by MD, Disp: 30 patch, Rfl: 3  •  montelukast (SINGULAIR) 10 MG tablet, Take 10 mg by mouth Daily., Disp: , Rfl: 0  •  Potassium Gluconate  MG tablet controlled-release, Take 595 mg by mouth Daily., Disp: , Rfl:   •  PREMARIN 0.625 MG tablet, Take 1 tablet by mouth Daily., Disp: 90 tablet, Rfl: 3  •  zolpidem (AMBIEN) 5 MG tablet, Take 1 tablet by mouth At Night As Needed for Sleep., Disp: 30 tablet, Rfl: 0    Review of Systems   Review of Systems   Constitution: Positive for malaise/fatigue and weight gain. Negative for night sweats and weight loss.   HENT:  Negative for hearing loss, hoarse voice and stridor.    Eyes: Negative for vision loss in left eye, vision loss in right eye and visual disturbance.   Cardiovascular: Positive for leg swelling. Negative for chest pain and palpitations.        Pain with walking  Circulation problems   Respiratory: Negative for cough, hemoptysis and shortness of breath.    Endocrine: Positive for cold intolerance.   Hematologic/Lymphatic: Negative for adenopathy and bleeding problem. Bruises/bleeds easily.   Skin: Negative for color change, poor wound healing and rash.   Musculoskeletal: Positive for arthritis, back pain, gout, muscle weakness and neck pain.   Gastrointestinal: Positive for dysphagia. Negative for abdominal pain and heartburn.   Neurological: Positive for headaches. Negative for dizziness, numbness and seizures.   Psychiatric/Behavioral: Negative for altered mental status, depression and memory loss. The patient is not nervous/anxious.        Physical Exam   Physical Exam   Constitutional: She is oriented to person, place, and time. She is active and cooperative. She does not appear ill. No distress.   HENT:   Head: Atraumatic.   Right Ear: Hearing normal.   Left Ear: Hearing normal.   Nose: No nasal deformity. No epistaxis.   Mouth/Throat: She does not have dentures. Normal dentition.   Eyes: Conjunctivae and lids are normal. Right pupil is round and reactive. Left pupil is round and reactive.   Neck: No JVD present. Carotid bruit is not present. No tracheal deviation present. No thyroid mass and no thyromegaly present.   Cardiovascular: Normal rate and regular rhythm.   No murmur heard.  Pulses:       Carotid pulses are 2+ on the right side, and 2+ on the left side.       Radial pulses are 2+ on the right side, and 2+ on the left side.        Femoral pulses are 2+ on the right side, and 2+ on the left side.       Dorsalis pedis pulses are 2+ on the right side, and 2+ on the left side.        Posterior tibial pulses  are 2+ on the right side, and 2+ on the left side.   Pulmonary/Chest: Effort normal and breath sounds normal.   Abdominal: Soft. She exhibits no distension and no mass. There is no splenomegaly or hepatomegaly. There is no tenderness.   Musculoskeletal: She exhibits no deformity.   Gait normal.    Lymphadenopathy:     She has no cervical adenopathy.        Right: No supraclavicular adenopathy present.        Left: No supraclavicular adenopathy present.   Neurological: She is alert and oriented to person, place, and time. She has normal strength.   Skin: Skin is warm and dry. No cyanosis or erythema. No pallor.   No venous staining   Psychiatric: She has a normal mood and affect. Her speech is normal. Judgment and thought content normal.     Results for PENNY MINER (MRN [id number]) as of 5/23/2019 17:49  GFR 73 Ref. Range 4/10/2019 09:39   Creatinine Latest Ref Range: 0.57 - 1.00 mg/dL 0.79   BUN Latest Ref Range: 8 - 23 mg/dL 16     ASSESSMENT:  Penny was seen today for carotid artery disease.    Diagnoses and all orders for this visit:    Bilateral carotid artery stenosis    Nicotine dependence, cigarettes, with other nicotine-induced disorders    Mixed hyperlipidemia    Chronic kidney disease, stage 2 (mild)    PLAN:  Detailed discussion with Penny Miner regarding situation and options.  Asymptomatic carotid stenosis.  Multiple risk factors with severe comorbidities.  No intervention indicated at this time.  Will follow with interval imaging.  Risks, benefits discussed.  Understands and wishes to proceed with plan.    Return in 3 years with Carotid Duplex    Return after above studies complete  Smoking cessation advised and assistance options offered including behavioral counseling ([facility]), Nicotine replacement therapy (patches or gum), pharmacologic therapy (Chantix, Wellbutrin). patient understands that continued use of tobacco products increases risk of heart  disease, stroke, cancer; counseling for 3-5min.  Recommended regular physical activity, progressive walking program.  Continue current medications as directed.  Will Obtain relevant old records.    Thank you for the opportunity to participate in this patient's care.    Copy to primary care provider.    EMR Dragon/Transcription disclaimer:   Much of this encounter note is an electronic transcription/translation of spoken language to printed text. The electronic translation of spoken language may permit erroneous, or at times, nonsensical words or phrases to be inadvertently transcribed; Although I have reviewed the note for such errors, some may still exist.

## 2019-08-16 RX ORDER — ATORVASTATIN CALCIUM 20 MG/1
TABLET, FILM COATED ORAL
Qty: 30 TABLET | Refills: 3 | Status: SHIPPED | OUTPATIENT
Start: 2019-08-16 | End: 2021-06-21 | Stop reason: ALTCHOICE

## 2019-10-30 RX ORDER — ESTRADIOL 1 MG/1
TABLET ORAL
Qty: 30 TABLET | Refills: 3 | Status: SHIPPED | OUTPATIENT
Start: 2019-10-30 | End: 2021-09-30

## 2019-11-04 RX ORDER — ESTRADIOL 1 MG/1
TABLET ORAL
Qty: 30 TABLET | Refills: 3 | OUTPATIENT
Start: 2019-11-04

## 2020-06-12 ENCOUNTER — HOSPITAL ENCOUNTER (OUTPATIENT)
Dept: GENERAL RADIOLOGY | Facility: HOSPITAL | Age: 65
Discharge: HOME | End: 2020-06-12
Admitting: ORTHOPAEDIC SURGERY

## 2020-06-12 ENCOUNTER — APPOINTMENT (OUTPATIENT)
Dept: PREADMISSION TESTING | Facility: HOSPITAL | Age: 65
End: 2020-06-12

## 2020-06-12 VITALS
OXYGEN SATURATION: 97 % | DIASTOLIC BLOOD PRESSURE: 65 MMHG | BODY MASS INDEX: 24.65 KG/M2 | SYSTOLIC BLOOD PRESSURE: 111 MMHG | HEART RATE: 70 BPM | RESPIRATION RATE: 18 BRPM | WEIGHT: 139.11 LBS | HEIGHT: 63 IN

## 2020-06-12 LAB
ALBUMIN SERPL-MCNC: 4.4 G/DL (ref 3.5–5.2)
ALBUMIN/GLOB SERPL: 2.1 G/DL
ALP SERPL-CCNC: 99 U/L (ref 39–117)
ALT SERPL W P-5'-P-CCNC: 17 U/L (ref 1–33)
ANION GAP SERPL CALCULATED.3IONS-SCNC: 11 MMOL/L (ref 5–15)
APTT PPP: 26.6 SECONDS (ref 24.1–35)
AST SERPL-CCNC: 23 U/L (ref 1–32)
BASOPHILS # BLD AUTO: 0.03 10*3/MM3 (ref 0–0.2)
BASOPHILS NFR BLD AUTO: 0.5 % (ref 0–1.5)
BILIRUB SERPL-MCNC: 0.5 MG/DL (ref 0.2–1.2)
BILIRUB UR QL STRIP: NEGATIVE
BUN BLD-MCNC: 10 MG/DL (ref 8–23)
BUN/CREAT SERPL: 12.8 (ref 7–25)
CALCIUM SPEC-SCNC: 9.3 MG/DL (ref 8.6–10.5)
CHLORIDE SERPL-SCNC: 107 MMOL/L (ref 98–107)
CLARITY UR: CLEAR
CO2 SERPL-SCNC: 25 MMOL/L (ref 22–29)
COLOR UR: YELLOW
CREAT BLD-MCNC: 0.78 MG/DL (ref 0.57–1)
DEPRECATED RDW RBC AUTO: 44.9 FL (ref 37–54)
EOSINOPHIL # BLD AUTO: 0.09 10*3/MM3 (ref 0–0.4)
EOSINOPHIL NFR BLD AUTO: 1.4 % (ref 0.3–6.2)
ERYTHROCYTE [DISTWIDTH] IN BLOOD BY AUTOMATED COUNT: 13.1 % (ref 12.3–15.4)
GFR SERPL CREATININE-BSD FRML MDRD: 74 ML/MIN/1.73
GLOBULIN UR ELPH-MCNC: 2.1 GM/DL
GLUCOSE BLD-MCNC: 102 MG/DL (ref 65–99)
GLUCOSE UR STRIP-MCNC: NEGATIVE MG/DL
HCT VFR BLD AUTO: 33.2 % (ref 34–46.6)
HGB BLD-MCNC: 10.9 G/DL (ref 12–15.9)
HGB UR QL STRIP.AUTO: NEGATIVE
IMM GRANULOCYTES # BLD AUTO: 0.02 10*3/MM3 (ref 0–0.05)
IMM GRANULOCYTES NFR BLD AUTO: 0.3 % (ref 0–0.5)
INR PPP: 1.01 (ref 0.91–1.09)
KETONES UR QL STRIP: NEGATIVE
LEUKOCYTE ESTERASE UR QL STRIP.AUTO: NEGATIVE
LYMPHOCYTES # BLD AUTO: 1.87 10*3/MM3 (ref 0.7–3.1)
LYMPHOCYTES NFR BLD AUTO: 29.7 % (ref 19.6–45.3)
MCH RBC QN AUTO: 30.7 PG (ref 26.6–33)
MCHC RBC AUTO-ENTMCNC: 32.8 G/DL (ref 31.5–35.7)
MCV RBC AUTO: 93.5 FL (ref 79–97)
MONOCYTES # BLD AUTO: 0.5 10*3/MM3 (ref 0.1–0.9)
MONOCYTES NFR BLD AUTO: 7.9 % (ref 5–12)
NEUTROPHILS # BLD AUTO: 3.79 10*3/MM3 (ref 1.7–7)
NEUTROPHILS NFR BLD AUTO: 60.2 % (ref 42.7–76)
NITRITE UR QL STRIP: NEGATIVE
NRBC BLD AUTO-RTO: 0 /100 WBC (ref 0–0.2)
PH UR STRIP.AUTO: 6 [PH] (ref 5–8)
PLATELET # BLD AUTO: 150 10*3/MM3 (ref 140–450)
PMV BLD AUTO: 12.4 FL (ref 6–12)
POTASSIUM BLD-SCNC: 4.3 MMOL/L (ref 3.5–5.2)
PROT SERPL-MCNC: 6.5 G/DL (ref 6–8.5)
PROT UR QL STRIP: NEGATIVE
PROTHROMBIN TIME: 12.9 SECONDS (ref 11.9–14.6)
RBC # BLD AUTO: 3.55 10*6/MM3 (ref 3.77–5.28)
SODIUM BLD-SCNC: 143 MMOL/L (ref 136–145)
SP GR UR STRIP: 1.01 (ref 1–1.03)
UROBILINOGEN UR QL STRIP: NORMAL
WBC NRBC COR # BLD: 6.3 10*3/MM3 (ref 3.4–10.8)

## 2020-06-12 PROCEDURE — 80053 COMPREHEN METABOLIC PANEL: CPT | Performed by: ORTHOPAEDIC SURGERY

## 2020-06-12 PROCEDURE — 85610 PROTHROMBIN TIME: CPT | Performed by: ORTHOPAEDIC SURGERY

## 2020-06-12 PROCEDURE — 93005 ELECTROCARDIOGRAM TRACING: CPT

## 2020-06-12 PROCEDURE — 81003 URINALYSIS AUTO W/O SCOPE: CPT | Performed by: ORTHOPAEDIC SURGERY

## 2020-06-12 PROCEDURE — 71045 X-RAY EXAM CHEST 1 VIEW: CPT

## 2020-06-12 PROCEDURE — 36415 COLL VENOUS BLD VENIPUNCTURE: CPT

## 2020-06-12 PROCEDURE — 85730 THROMBOPLASTIN TIME PARTIAL: CPT | Performed by: ORTHOPAEDIC SURGERY

## 2020-06-12 PROCEDURE — 85025 COMPLETE CBC W/AUTO DIFF WBC: CPT | Performed by: ORTHOPAEDIC SURGERY

## 2020-06-12 PROCEDURE — 93010 ELECTROCARDIOGRAM REPORT: CPT | Performed by: INTERNAL MEDICINE

## 2020-06-12 NOTE — DISCHARGE INSTRUCTIONS
DAY OF SURGERY INSTRUCTIONS        YOUR SURGEON: ***BHARAT STRENGE    PROCEDURE: ***CERVICAL FUSION    DATE OF SURGERY: ***06/22/2020    ARRIVAL TIME: AS DIRECTED BY OFFICE    YOU MAY TAKE THE FOLLOWING MEDICATION(S) THE MORNING OF SURGERY WITH A SIP OF WATER: ***      ALL OTHER HOME MEDICATION CHECK WITH YOUR PHYSICIAN      DO NOT TAKE ANY ERECTILE DYSFUNCTION MEDICATIONS (EX: CIALIS, VIAGRA) 24 HOURS PRIOR TO SURGERY                      MANAGING PAIN AFTER SURGERY    We know you are probably wondering what your pain will be like after surgery.  Following surgery it is unrealistic to expect you will not have pain.   Pain is how our bodies let us know that something is wrong or cautions us to be careful.  That said, our goal is to make your pain tolerable.    Methods we may use to treat your pain include (oral or IV medications, PCAs, epidurals, nerve blocks, etc.)   While some procedures require IV pain medications for a short time after surgery, transitioning to pain medications by mouth allows for better management of pain.   Your nurse will encourage you to take oral pain medications whenever possible.  IV medications work almost immediately, but only last a short while.  Taking medications by mouth allows for a more constant level of medication in your blood stream for a longer period of time.      Once your pain is out of control it is harder to get back under control.  It is important you are aware when your next dose of pain medication is due.  If you are admitted, your nurse may write the time of your next dose on the white board in your room to help you remember.      We are interested in your pain and encourage you to inform us about aggravating factors during your visit.   Many times a simple repositioning every few hours can make a big difference.    If your physician says it is okay, do not let your pain prevent you from getting out of bed. Be sure to call your nurse for assistance prior to getting  up so you do not fall.      Before surgery, please decide your tolerable pain goal.  These faces help describe the pain ratings we use on a 0-10 scale.   Be prepared to tell us your goal and whether or not you take pain or anxiety medications at home.          BEFORE YOU COME TO THE HOSPITAL  (Pre-op instructions)  • Do not eat, drink, smoke or chew gum after midnight the night before surgery.  This also includes no mints.  • Morning of surgery take only the medicines you have been instructed with a sip of water unless otherwise instructed  by your physician.  • Do not shave, wear makeup or dark nail polish.  • Remove all jewelry including rings.  • Leave anything you consider valuable at home.  • Leave your suitcase in the car until after your surgery.  • Bring the following with you if applicable:  o Picture ID and insurance, Medicare or Medicaid cards  o Co-pay/deductible required by insurance (cash, check, credit card)  o Copy of advance directive, living will or power-of- documents if not brought to PAT  o CPAP or BIPAP mask and tubing  o Relaxation aids ( book, magazine), etc.  o Hearing aids                        ON THE DAY OF SURGERY  · On the day of surgery check in at registration located at the main entrance of the hospital.   ? You will be registered and given a beeper with instructions where to wait in the main lobby.  ? When your beeper lights up and vibrates a member of the Outpatient Surgery staff will meet you at the double doors under the stair steps and escort you to your preoperative room.   · You may have cloth compression devices placed on your legs. These help to prevent blood clots and reduce swelling in your legs.  · An IV may be inserted into one of your veins.  · In the operating room, you may be given one or more of the following:  ? A medicine to help you relax (sedative).  ? A medicine to numb the area (local anesthetic).  ? A medicine to make you fall asleep (general  "anesthetic).  ? A medicine that is injected into an area of your body to numb everything below the injection site (regional anesthetic).  · Your surgical site will be marked or identified.  · You may be given an antibiotic through your IV to help prevent infection.  Contact a health care provider if you:  · Develop a fever of more than 100.4°F (38°C) or other feelings of illness during the 48 hours before your surgery.  · Have symptoms that get worse.  Have questions or concerns about your surgery    General Anesthesia/Surgery, Adult  General anesthesia is the use of medicines to make a person \"go to sleep\" (unconscious) for a medical procedure. General anesthesia must be used for certain procedures, and is often recommended for procedures that:  · Last a long time.  · Require you to be still or in an unusual position.  · Are major and can cause blood loss.  The medicines used for general anesthesia are called general anesthetics. As well as making you unconscious for a certain amount of time, these medicines:  · Prevent pain.  · Control your blood pressure.  · Relax your muscles.  Tell a health care provider about:  · Any allergies you have.  · All medicines you are taking, including vitamins, herbs, eye drops, creams, and over-the-counter medicines.  · Any problems you or family members have had with anesthetic medicines.  · Types of anesthetics you have had in the past.  · Any blood disorders you have.  · Any surgeries you have had.  · Any medical conditions you have.  · Any recent upper respiratory, chest, or ear infections.  · Any history of:  ? Heart or lung conditions, such as heart failure, sleep apnea, asthma, or chronic obstructive pulmonary disease (COPD).  ?  service.  ? Depression or anxiety.  · Any tobacco or drug use, including marijuana or alcohol use.  · Whether you are pregnant or may be pregnant.  What are the risks?  Generally, this is a safe procedure. However, problems may occur, " including:  · Allergic reaction.  · Lung and heart problems.  · Inhaling food or liquid from the stomach into the lungs (aspiration).  · Nerve injury.  · Air in the bloodstream, which can lead to stroke.  · Extreme agitation or confusion (delirium) when you wake up from the anesthetic.  · Waking up during your procedure and being unable to move. This is rare.  These problems are more likely to develop if you are having a major surgery or if you have an advanced or serious medical condition. You can prevent some of these complications by answering all of your health care provider's questions thoroughly and by following all instructions before your procedure.  General anesthesia can cause side effects, including:  · Nausea or vomiting.  · A sore throat from the breathing tube.  · Hoarseness.  · Wheezing or coughing.  · Shaking chills.  · Tiredness.  · Body aches.  · Anxiety.  · Sleepiness or drowsiness.  · Confusion or agitation.  RISKS AND COMPLICATIONS OF SURGERY  Your health care provider will discuss possible risks and complications with you before surgery. Common risks and complications include:    · Problems due to the use of anesthetics.  · Blood loss and replacement (does not apply to minor surgical procedures).  · Temporary increase in pain due to surgery.  · Uncorrected pain or problems that the surgery was meant to correct.  · Infection.  · New damage.    What happens before the procedure?    Medicines  Ask your health care provider about:  · Changing or stopping your regular medicines. This is especially important if you are taking diabetes medicines or blood thinners.  · Taking medicines such as aspirin and ibuprofen. These medicines can thin your blood. Do not take these medicines unless your health care provider tells you to take them.  · Taking over-the-counter medicines, vitamins, herbs, and supplements. Do not take these during the week before your procedure unless your health care provider approves  them.  General instructions  · Starting 3-6 weeks before the procedure, do not use any products that contain nicotine or tobacco, such as cigarettes and e-cigarettes. If you need help quitting, ask your health care provider.  · If you brush your teeth on the morning of the procedure, make sure to spit out all of the toothpaste.  · Tell your health care provider if you become ill or develop a cold, cough, or fever.  · If instructed by your health care provider, bring your sleep apnea device with you on the day of your surgery (if applicable).  · Ask your health care provider if you will be going home the same day, the following day, or after a longer hospital stay.  ? Plan to have someone take you home from the hospital or clinic.  ? Plan to have a responsible adult care for you for at least 24 hours after you leave the hospital or clinic. This is important.  What happens during the procedure?  · You will be given anesthetics through both of the following:  ? A mask placed over your nose and mouth.  ? An IV in one of your veins.  · You may receive a medicine to help you relax (sedative).  · After you are unconscious, a breathing tube may be inserted down your throat to help you breathe. This will be removed before you wake up.  · An anesthesia specialist will stay with you throughout your procedure. He or she will:  ? Keep you comfortable and safe by continuing to give you medicines and adjusting the amount of medicine that you get.  ? Monitor your blood pressure, pulse, and oxygen levels to make sure that the anesthetics do not cause any problems.  The procedure may vary among health care providers and hospitals.  What happens after the procedure?  · Your blood pressure, temperature, heart rate, breathing rate, and blood oxygen level will be monitored until the medicines you were given have worn off.  · You will wake up in a recovery area. You may wake up slowly.  · If you feel anxious or agitated, you may be given  medicine to help you calm down.  · If you will be going home the same day, your health care provider may check to make sure you can walk, drink, and urinate.  · Your health care provider will treat any pain or side effects you have before you go home.  · Do not drive for 24 hours if you were given a sedative.  Summary  · General anesthesia is used to keep you still and prevent pain during a procedure.  · It is important to tell your healthcare provider about your medical history and any surgeries you have had, and previous experience with anesthesia.  · Follow your healthcare provider’s instructions about when to stop eating, drinking, or taking certain medicines before your procedure.  · Plan to have someone take you home from the hospital or clinic.  This information is not intended to replace advice given to you by your health care provider. Make sure you discuss any questions you have with your health care provider.  Document Released: 03/26/2009 Document Revised: 08/03/2018 Document Reviewed: 08/03/2018  [employer] Interactive Patient Education © 2019 [employer]       Fall Prevention in Hospitals, Adult  As a hospital patient, your condition and the treatments you receive can increase your risk for falls. Some additional risk factors for falls in a hospital include:  · Being in an unfamiliar environment.  · Being on bed rest.  · Your surgery.  · Taking certain medicines.  · Your tubing requirements, such as intravenous (IV) therapy or catheters.  It is important that you learn how to decrease fall risks while at the hospital. Below are important tips that can help prevent falls.  SAFETY TIPS FOR PREVENTING FALLS  Talk about your risk of falling.  · Ask your health care provider why you are at risk for falling. Is it your medicine, illness, tubing placement, or something else?  · Make a plan with your health care provider to keep you safe from falls.  · Ask your health care provider or pharmacist about side  effects of your medicines. Some medicines can make you dizzy or affect your coordination.  Ask for help.  · Ask for help before getting out of bed. You may need to press your call button.  · Ask for assistance in getting safely to the toilet.  · Ask for a walker or cane to be put at your bedside. Ask that most of the side rails on your bed be placed up before your health care provider leaves the room.  · Ask family or friends to sit with you.  · Ask for things that are out of your reach, such as your glasses, hearing aids, telephone, bedside table, or call button.  Follow these tips to avoid falling:  · Stay lying or seated, rather than standing, while waiting for help.  · Wear rubber-soled slippers or shoes whenever you walk in the hospital.  · Avoid quick, sudden movements.  ¨ Change positions slowly.  ¨ Sit on the side of your bed before standing.  ¨ Stand up slowly and wait before you start to walk.  · Let your health care provider know if there is a spill on the floor.  · Pay careful attention to the medical equipment, electrical cords, and tubes around you.  · When you need help, use your call button by your bed or in the bathroom. Wait for one of your health care providers to help you.  · If you feel dizzy or unsure of your footing, return to bed and wait for assistance.  · Avoid being distracted by the TV, telephone, or another person in your room.  · Do not lean or support yourself on rolling objects, such as IV poles or bedside tables.     This information is not intended to replace advice given to you by your health care provider. Make sure you discuss any questions you have with your health care provider.     Document Released: 12/15/2001 Document Revised: 01/08/2016 Document Reviewed: 08/25/2013  [employer] Interactive Patient Education ©2016 [employer]       [facility]  CHG 4% Patient Instruction Sheet    Chlorhexidine Before Surgery  Chlorhexidine gluconate (CHG) is a germ-killing  (antiseptic) solution that is used to clean the skin. It gets rid of the bacteria that normally live on the skin. Cleaning your skin with CHG before surgery helps lower the risk for infection after surgery.    How to use CHG solution  · You will take 2 showers, one shower the night before surgery, the second shower the morning of surgery before coming to the hospital.  · Use CHG only as told by your health care provider, and follow the instructions on the label.  · Use CHG solution while taking a shower. Follow these steps when using CHG solution (unless your health care provider gives you different instructions):  1. Start the shower.  2. Use your normal soap and shampoo to wash your face and hair.  3. Turn off the shower or move out of the shower stream.  4. Pour the CHG onto a clean washcloth. Do not use any type of brush or rough-edged sponge.  5. Starting at your neck, lather your body down to your toes. Make sure you:  6. Pay special attention to the part of your body where you will be having surgery. Scrub this area for at least 1 minute.  7. Use the full amount of CHG as directed. Usually, this is one half bottle for each shower.  8. Do not use CHG on your head or face. If the solution gets into your ears or eyes, rinse them well with water.  9. Avoid your genital area.  10. Avoid any areas of skin that have broken skin, cuts, or scrapes.  11. Scrub your back and under your arms. Make sure to wash skin folds.  12. Let the lather sit on your skin for 1-2 minutes or as long as told by your health care  provider.  13. Thoroughly rinse your entire body in the shower. Make sure that all body creases and crevices are rinsed well.  14. Dry off with a clean towel. Do not put any substances on your body afterward, such as powder, lotion, or perfume.  15. Put on clean clothes or pajamas.  16. If it is the night before your surgery, sleep in clean sheets.    What are the risks?  Risks of using CHG include:  · A skin  reaction.  · Hearing loss, if CHG gets in your ears.  · Eye injury, if CHG gets in your eyes and is not rinsed out.  · The CHG product catching fire.  Make sure that you avoid smoking and flames after applying CHG to your skin.  Do not use CHG:  · If you have a chlorhexidine allergy or have previously reacted to chlorhexidine.  · On babies younger than 2 months of age.      On the day of surgery, when you are taken to your room in Outpatient Surgery you will be given a CHG prepackaged cloth to wipe the site for your surgery.  How to use CHG prepackaged cloths  · Follow the instructions on the label.  · Use the CHG cloth on clean, dry skin. Follow these steps when using a CHG cloth (unless your health care provider gives you different instructions):  1. Using the CHG cloth, vigorously scrub the part of your body where you will be having surgery. Scrub using a back-and-forth motion for 3 minutes. The area on your body should be completely wet with CHG when you are finished scrubbing.  2. Do not rinse. Discard the cloth and let the area air-dry for 1 minute. Do not put any substances on your body afterward, such as powder, lotion, or perfume.  Contact a health care provider if:  · Your skin gets irritated after scrubbing.  · You have questions about using your solution or cloth.  Get help right away if:  · Your eyes become very red or swollen.  · Your eyes itch badly.  · Your skin itches badly and is red or swollen.  · Your hearing changes.  · You have trouble seeing.  · You have swelling or tingling in your mouth or throat.  · You have trouble breathing.  · You swallow any chlorhexidine.  Summary  · Chlorhexidine gluconate (CHG) is a germ-killing (antiseptic) solution that is used to clean the skin. Cleaning your skin with CHG before surgery helps lower the risk for infection after surgery.  · You may be given CHG to use at home. It may be in a bottle or in a prepackaged cloth to use on your skin. Carefully follow  your health care provider's instructions and the instructions on the product label.  · Do not use CHG if you have a chlorhexidine allergy.  · Contact your health care provider if your skin gets irritated after scrubbing.  This information is not intended to replace advice given to you by your health care provider. Make sure you discuss any questions you have with your health care provider.  Document Released: 09/11/2013 Document Revised: 11/15/2018 Document Reviewed: 11/15/2018  [employer] Interactive Patient Education © 2019 [employer]          PATIENT/FAMILY/RESPONSIBLE PARTY VERBALIZES UNDERSTANDING OF ABOVE EDUCATION.  COPY OF PAIN SCALE GIVEN AND REVIEWED WITH VERBALIZED UNDERSTANDING.

## 2020-06-16 ENCOUNTER — TRANSCRIBE ORDERS (OUTPATIENT)
Dept: ADMINISTRATIVE | Facility: HOSPITAL | Age: 65
End: 2020-06-16

## 2020-06-16 DIAGNOSIS — Z01.818 PREOP TESTING: Primary | ICD-10-CM

## 2020-06-22 ENCOUNTER — HOSPITAL ENCOUNTER (INPATIENT)
Facility: HOSPITAL | Age: 65
LOS: 1 days | Discharge: HOME | End: 2020-06-23
Attending: ORTHOPAEDIC SURGERY | Admitting: ORTHOPAEDIC SURGERY

## 2020-06-22 ENCOUNTER — ANESTHESIA EVENT (OUTPATIENT)
Dept: PERIOP | Facility: HOSPITAL | Age: 65
End: 2020-06-22

## 2020-06-22 ENCOUNTER — APPOINTMENT (OUTPATIENT)
Dept: GENERAL RADIOLOGY | Facility: HOSPITAL | Age: 65
End: 2020-06-22

## 2020-06-22 ENCOUNTER — ANESTHESIA (OUTPATIENT)
Dept: PERIOP | Facility: HOSPITAL | Age: 65
End: 2020-06-22

## 2020-06-22 DIAGNOSIS — M48.02 STENOSIS, CERVICAL SPINE: Primary | ICD-10-CM

## 2020-06-22 LAB
ABO GROUP BLD: NORMAL
BLD GP AB SCN SERPL QL: NEGATIVE
RH BLD: POSITIVE
SARS-COV-2 RNA PNL SPEC NAA+PROBE: NOT DETECTED
T&S EXPIRATION DATE: NORMAL

## 2020-06-22 PROCEDURE — 25010000002 MIDAZOLAM PER 1 MG: Performed by: ANESTHESIOLOGY

## 2020-06-22 PROCEDURE — 0RG20A0 FUSION OF 2 OR MORE CERVICAL VERTEBRAL JOINTS WITH INTERBODY FUSION DEVICE, ANTERIOR APPROACH, ANTERIOR COLUMN, OPEN APPROACH: ICD-10-PCS | Performed by: ORTHOPAEDIC SURGERY

## 2020-06-22 PROCEDURE — 94799 UNLISTED PULMONARY SVC/PX: CPT

## 2020-06-22 PROCEDURE — 0RB30ZZ EXCISION OF CERVICAL VERTEBRAL DISC, OPEN APPROACH: ICD-10-PCS | Performed by: ORTHOPAEDIC SURGERY

## 2020-06-22 PROCEDURE — 86850 RBC ANTIBODY SCREEN: CPT | Performed by: ORTHOPAEDIC SURGERY

## 2020-06-22 PROCEDURE — C1713 ANCHOR/SCREW BN/BN,TIS/BN: HCPCS | Performed by: ORTHOPAEDIC SURGERY

## 2020-06-22 PROCEDURE — 25010000002 SUCCINYLCHOLINE PER 20 MG: Performed by: NURSE ANESTHETIST, CERTIFIED REGISTERED

## 2020-06-22 PROCEDURE — 87635 SARS-COV-2 COVID-19 AMP PRB: CPT | Performed by: ORTHOPAEDIC SURGERY

## 2020-06-22 PROCEDURE — 25010000002 DEXAMETHASONE PER 1 MG: Performed by: ANESTHESIOLOGY

## 2020-06-22 PROCEDURE — 76000 FLUOROSCOPY <1 HR PHYS/QHP: CPT

## 2020-06-22 PROCEDURE — 25010000003 CEFAZOLIN 1-4 GM/50ML-% SOLUTION: Performed by: ORTHOPAEDIC SURGERY

## 2020-06-22 PROCEDURE — 0RP104Z REMOVAL OF INTERNAL FIXATION DEVICE FROM CERVICAL VERTEBRAL JOINT, OPEN APPROACH: ICD-10-PCS | Performed by: ORTHOPAEDIC SURGERY

## 2020-06-22 PROCEDURE — 86901 BLOOD TYPING SEROLOGIC RH(D): CPT | Performed by: ORTHOPAEDIC SURGERY

## 2020-06-22 PROCEDURE — 25010000002 PROPOFOL 10 MG/ML EMULSION: Performed by: NURSE ANESTHETIST, CERTIFIED REGISTERED

## 2020-06-22 PROCEDURE — 25010000002 ONDANSETRON PER 1 MG: Performed by: ANESTHESIOLOGY

## 2020-06-22 PROCEDURE — 86900 BLOOD TYPING SEROLOGIC ABO: CPT | Performed by: ORTHOPAEDIC SURGERY

## 2020-06-22 PROCEDURE — 25010000003 HYDROMORPHONE 1 MG/ML SOLUTION: Performed by: ORTHOPAEDIC SURGERY

## 2020-06-22 PROCEDURE — 72040 X-RAY EXAM NECK SPINE 2-3 VW: CPT

## 2020-06-22 PROCEDURE — 25010000002 FENTANYL CITRATE (PF) 100 MCG/2ML SOLUTION: Performed by: ANESTHESIOLOGY

## 2020-06-22 DEVICE — ALLOGRFT FLD BIOBURST 1ML: Type: IMPLANTABLE DEVICE | Site: SPINE CERVICAL | Status: FUNCTIONAL

## 2020-06-22 DEVICE — IMPLANTABLE DEVICE: Type: IMPLANTABLE DEVICE | Site: SPINE CERVICAL | Status: FUNCTIONAL

## 2020-06-22 DEVICE — SCRW VARI SLF/DRL 4X14MM: Type: IMPLANTABLE DEVICE | Site: SPINE CERVICAL | Status: FUNCTIONAL

## 2020-06-22 DEVICE — INTERBODY FUSION DEVICE
Type: IMPLANTABLE DEVICE | Site: SPINE CERVICAL | Status: FUNCTIONAL
Brand: FORTILINK-C IBF SYSTEM

## 2020-06-22 DEVICE — BONE CANC CRUSHED 5CC 1TO4MM: Type: IMPLANTABLE DEVICE | Site: SPINE CERVICAL | Status: FUNCTIONAL

## 2020-06-22 RX ORDER — CETIRIZINE HYDROCHLORIDE 10 MG/1
10 TABLET ORAL DAILY
Status: DISCONTINUED | OUTPATIENT
Start: 2020-06-22 | End: 2020-06-23 | Stop reason: HOSPADM

## 2020-06-22 RX ORDER — OXYCODONE HCL 20 MG/1
20 TABLET, FILM COATED, EXTENDED RELEASE ORAL ONCE
Status: COMPLETED | OUTPATIENT
Start: 2020-06-22 | End: 2020-06-22

## 2020-06-22 RX ORDER — CEFAZOLIN SODIUM 1 G/50ML
1 INJECTION, SOLUTION INTRAVENOUS EVERY 8 HOURS
Status: DISCONTINUED | OUTPATIENT
Start: 2020-06-22 | End: 2020-06-23 | Stop reason: HOSPADM

## 2020-06-22 RX ORDER — DIPHENHYDRAMINE HCL 25 MG
25 CAPSULE ORAL NIGHTLY PRN
Status: DISCONTINUED | OUTPATIENT
Start: 2020-06-22 | End: 2020-06-23 | Stop reason: HOSPADM

## 2020-06-22 RX ORDER — SODIUM CHLORIDE 0.9 % (FLUSH) 0.9 %
3 SYRINGE (ML) INJECTION EVERY 12 HOURS SCHEDULED
Status: DISCONTINUED | OUTPATIENT
Start: 2020-06-22 | End: 2020-06-23 | Stop reason: HOSPADM

## 2020-06-22 RX ORDER — FLUMAZENIL 0.1 MG/ML
0.2 INJECTION INTRAVENOUS AS NEEDED
Status: DISCONTINUED | OUTPATIENT
Start: 2020-06-22 | End: 2020-06-22 | Stop reason: HOSPADM

## 2020-06-22 RX ORDER — SODIUM CHLORIDE 0.9 % (FLUSH) 0.9 %
10 SYRINGE (ML) INJECTION EVERY 12 HOURS SCHEDULED
Status: DISCONTINUED | OUTPATIENT
Start: 2020-06-22 | End: 2020-06-22 | Stop reason: HOSPADM

## 2020-06-22 RX ORDER — SUFENTANIL CITRATE 50 UG/ML
INJECTION EPIDURAL; INTRAVENOUS AS NEEDED
Status: DISCONTINUED | OUTPATIENT
Start: 2020-06-22 | End: 2020-06-22 | Stop reason: SURG

## 2020-06-22 RX ORDER — SUCCINYLCHOLINE CHLORIDE 20 MG/ML
INJECTION INTRAMUSCULAR; INTRAVENOUS AS NEEDED
Status: DISCONTINUED | OUTPATIENT
Start: 2020-06-22 | End: 2020-06-22 | Stop reason: SURG

## 2020-06-22 RX ORDER — FENTANYL CITRATE 50 UG/ML
25 INJECTION, SOLUTION INTRAMUSCULAR; INTRAVENOUS
Status: DISCONTINUED | OUTPATIENT
Start: 2020-06-22 | End: 2020-06-22 | Stop reason: HOSPADM

## 2020-06-22 RX ORDER — NALOXONE HCL 0.4 MG/ML
0.04 VIAL (ML) INJECTION AS NEEDED
Status: DISCONTINUED | OUTPATIENT
Start: 2020-06-22 | End: 2020-06-22 | Stop reason: HOSPADM

## 2020-06-22 RX ORDER — POTASSIUM CHLORIDE 750 MG/1
10 CAPSULE, EXTENDED RELEASE ORAL DAILY
Status: DISCONTINUED | OUTPATIENT
Start: 2020-06-22 | End: 2020-06-23 | Stop reason: HOSPADM

## 2020-06-22 RX ORDER — FAMOTIDINE 10 MG/ML
20 INJECTION, SOLUTION INTRAVENOUS EVERY 12 HOURS SCHEDULED
Status: DISCONTINUED | OUTPATIENT
Start: 2020-06-22 | End: 2020-06-23 | Stop reason: HOSPADM

## 2020-06-22 RX ORDER — MAGNESIUM HYDROXIDE 1200 MG/15ML
LIQUID ORAL AS NEEDED
Status: DISCONTINUED | OUTPATIENT
Start: 2020-06-22 | End: 2020-06-22 | Stop reason: HOSPADM

## 2020-06-22 RX ORDER — LIDOCAINE HYDROCHLORIDE 10 MG/ML
0.5 INJECTION, SOLUTION EPIDURAL; INFILTRATION; INTRACAUDAL; PERINEURAL ONCE AS NEEDED
Status: DISCONTINUED | OUTPATIENT
Start: 2020-06-22 | End: 2020-06-22 | Stop reason: HOSPADM

## 2020-06-22 RX ORDER — GABAPENTIN 400 MG/1
800 CAPSULE ORAL EVERY 8 HOURS SCHEDULED
Status: DISCONTINUED | OUTPATIENT
Start: 2020-06-22 | End: 2020-06-23 | Stop reason: HOSPADM

## 2020-06-22 RX ORDER — MONTELUKAST SODIUM 10 MG/1
10 TABLET ORAL NIGHTLY
Status: DISCONTINUED | OUTPATIENT
Start: 2020-06-22 | End: 2020-06-23 | Stop reason: HOSPADM

## 2020-06-22 RX ORDER — MORPHINE SULFATE 2 MG/ML
2 INJECTION, SOLUTION INTRAMUSCULAR; INTRAVENOUS
Status: DISCONTINUED | OUTPATIENT
Start: 2020-06-22 | End: 2020-06-22 | Stop reason: HOSPADM

## 2020-06-22 RX ORDER — SODIUM CHLORIDE, SODIUM LACTATE, POTASSIUM CHLORIDE, CALCIUM CHLORIDE 600; 310; 30; 20 MG/100ML; MG/100ML; MG/100ML; MG/100ML
1000 INJECTION, SOLUTION INTRAVENOUS CONTINUOUS
Status: DISCONTINUED | OUTPATIENT
Start: 2020-06-22 | End: 2020-06-23 | Stop reason: HOSPADM

## 2020-06-22 RX ORDER — SODIUM CHLORIDE, SODIUM LACTATE, POTASSIUM CHLORIDE, CALCIUM CHLORIDE 600; 310; 30; 20 MG/100ML; MG/100ML; MG/100ML; MG/100ML
20 INJECTION, SOLUTION INTRAVENOUS CONTINUOUS
Status: DISCONTINUED | OUTPATIENT
Start: 2020-06-22 | End: 2020-06-23 | Stop reason: HOSPADM

## 2020-06-22 RX ORDER — ACETAMINOPHEN 500 MG
1000 TABLET ORAL ONCE
Status: COMPLETED | OUTPATIENT
Start: 2020-06-22 | End: 2020-06-22

## 2020-06-22 RX ORDER — SODIUM CHLORIDE 9 MG/ML
75 INJECTION, SOLUTION INTRAVENOUS CONTINUOUS
Status: DISCONTINUED | OUTPATIENT
Start: 2020-06-22 | End: 2020-06-23 | Stop reason: HOSPADM

## 2020-06-22 RX ORDER — OXYCODONE AND ACETAMINOPHEN 7.5; 325 MG/1; MG/1
2 TABLET ORAL ONCE AS NEEDED
Status: COMPLETED | OUTPATIENT
Start: 2020-06-22 | End: 2020-06-22

## 2020-06-22 RX ORDER — SODIUM CHLORIDE 0.9 % (FLUSH) 0.9 %
3 SYRINGE (ML) INJECTION EVERY 12 HOURS SCHEDULED
Status: DISCONTINUED | OUTPATIENT
Start: 2020-06-22 | End: 2020-06-22 | Stop reason: HOSPADM

## 2020-06-22 RX ORDER — LEVOTHYROXINE SODIUM 0.1 MG/1
100 TABLET ORAL
Status: DISCONTINUED | OUTPATIENT
Start: 2020-06-23 | End: 2020-06-23 | Stop reason: HOSPADM

## 2020-06-22 RX ORDER — MIDAZOLAM HYDROCHLORIDE 1 MG/ML
1 INJECTION INTRAMUSCULAR; INTRAVENOUS
Status: COMPLETED | OUTPATIENT
Start: 2020-06-22 | End: 2020-06-22

## 2020-06-22 RX ORDER — SODIUM CHLORIDE 0.9 % (FLUSH) 0.9 %
3 SYRINGE (ML) INJECTION AS NEEDED
Status: DISCONTINUED | OUTPATIENT
Start: 2020-06-22 | End: 2020-06-22 | Stop reason: HOSPADM

## 2020-06-22 RX ORDER — LABETALOL HYDROCHLORIDE 5 MG/ML
5 INJECTION, SOLUTION INTRAVENOUS
Status: DISCONTINUED | OUTPATIENT
Start: 2020-06-22 | End: 2020-06-22 | Stop reason: HOSPADM

## 2020-06-22 RX ORDER — ATORVASTATIN CALCIUM 10 MG/1
20 TABLET, FILM COATED ORAL NIGHTLY
Status: DISCONTINUED | OUTPATIENT
Start: 2020-06-22 | End: 2020-06-23 | Stop reason: HOSPADM

## 2020-06-22 RX ORDER — PROPOFOL 10 MG/ML
VIAL (ML) INTRAVENOUS AS NEEDED
Status: DISCONTINUED | OUTPATIENT
Start: 2020-06-22 | End: 2020-06-22 | Stop reason: SURG

## 2020-06-22 RX ORDER — ZOLPIDEM TARTRATE 5 MG/1
5 TABLET ORAL NIGHTLY PRN
Status: DISCONTINUED | OUTPATIENT
Start: 2020-06-22 | End: 2020-06-22

## 2020-06-22 RX ORDER — FAMOTIDINE 20 MG/1
20 TABLET, FILM COATED ORAL EVERY 12 HOURS SCHEDULED
Status: DISCONTINUED | OUTPATIENT
Start: 2020-06-22 | End: 2020-06-23 | Stop reason: HOSPADM

## 2020-06-22 RX ORDER — ONDANSETRON 2 MG/ML
4 INJECTION INTRAMUSCULAR; INTRAVENOUS EVERY 6 HOURS PRN
Status: DISCONTINUED | OUTPATIENT
Start: 2020-06-22 | End: 2020-06-23 | Stop reason: HOSPADM

## 2020-06-22 RX ORDER — SODIUM CHLORIDE, SODIUM LACTATE, POTASSIUM CHLORIDE, CALCIUM CHLORIDE 600; 310; 30; 20 MG/100ML; MG/100ML; MG/100ML; MG/100ML
100 INJECTION, SOLUTION INTRAVENOUS CONTINUOUS
Status: DISCONTINUED | OUTPATIENT
Start: 2020-06-22 | End: 2020-06-23 | Stop reason: HOSPADM

## 2020-06-22 RX ORDER — SODIUM CHLORIDE 0.9 % (FLUSH) 0.9 %
3-10 SYRINGE (ML) INJECTION AS NEEDED
Status: DISCONTINUED | OUTPATIENT
Start: 2020-06-22 | End: 2020-06-22 | Stop reason: HOSPADM

## 2020-06-22 RX ORDER — EPHEDRINE SULFATE 50 MG/ML
INJECTION INTRAVENOUS AS NEEDED
Status: DISCONTINUED | OUTPATIENT
Start: 2020-06-22 | End: 2020-06-22 | Stop reason: SURG

## 2020-06-22 RX ORDER — OXYCODONE AND ACETAMINOPHEN 10; 325 MG/1; MG/1
1 TABLET ORAL ONCE AS NEEDED
Status: DISCONTINUED | OUTPATIENT
Start: 2020-06-22 | End: 2020-06-22 | Stop reason: HOSPADM

## 2020-06-22 RX ORDER — SODIUM CHLORIDE 0.9 % (FLUSH) 0.9 %
10 SYRINGE (ML) INJECTION AS NEEDED
Status: DISCONTINUED | OUTPATIENT
Start: 2020-06-22 | End: 2020-06-22 | Stop reason: HOSPADM

## 2020-06-22 RX ORDER — SODIUM CHLORIDE 0.9 % (FLUSH) 0.9 %
10 SYRINGE (ML) INJECTION AS NEEDED
Status: DISCONTINUED | OUTPATIENT
Start: 2020-06-22 | End: 2020-06-23 | Stop reason: HOSPADM

## 2020-06-22 RX ORDER — DEXAMETHASONE SODIUM PHOSPHATE 4 MG/ML
4 INJECTION, SOLUTION INTRA-ARTICULAR; INTRALESIONAL; INTRAMUSCULAR; INTRAVENOUS; SOFT TISSUE ONCE AS NEEDED
Status: COMPLETED | OUTPATIENT
Start: 2020-06-22 | End: 2020-06-22

## 2020-06-22 RX ORDER — ONDANSETRON 2 MG/ML
4 INJECTION INTRAMUSCULAR; INTRAVENOUS EVERY 6 HOURS PRN
Status: DISCONTINUED | OUTPATIENT
Start: 2020-06-22 | End: 2020-06-22

## 2020-06-22 RX ORDER — ROCURONIUM BROMIDE 10 MG/ML
INJECTION, SOLUTION INTRAVENOUS AS NEEDED
Status: DISCONTINUED | OUTPATIENT
Start: 2020-06-22 | End: 2020-06-22 | Stop reason: SURG

## 2020-06-22 RX ORDER — ZOLPIDEM TARTRATE 5 MG/1
10 TABLET ORAL NIGHTLY PRN
Status: DISCONTINUED | OUTPATIENT
Start: 2020-06-22 | End: 2020-06-23 | Stop reason: HOSPADM

## 2020-06-22 RX ORDER — ONDANSETRON 4 MG/1
4 TABLET, FILM COATED ORAL EVERY 6 HOURS PRN
Status: DISCONTINUED | OUTPATIENT
Start: 2020-06-22 | End: 2020-06-23 | Stop reason: HOSPADM

## 2020-06-22 RX ORDER — FUROSEMIDE 20 MG/1
20 TABLET ORAL DAILY
Status: DISCONTINUED | OUTPATIENT
Start: 2020-06-22 | End: 2020-06-23 | Stop reason: HOSPADM

## 2020-06-22 RX ORDER — ONDANSETRON 2 MG/ML
4 INJECTION INTRAMUSCULAR; INTRAVENOUS AS NEEDED
Status: DISCONTINUED | OUTPATIENT
Start: 2020-06-22 | End: 2020-06-22 | Stop reason: HOSPADM

## 2020-06-22 RX ORDER — ESTRADIOL 1 MG/1
1 TABLET ORAL DAILY
Status: DISCONTINUED | OUTPATIENT
Start: 2020-06-22 | End: 2020-06-23 | Stop reason: HOSPADM

## 2020-06-22 RX ORDER — SODIUM CHLORIDE, SODIUM LACTATE, POTASSIUM CHLORIDE, CALCIUM CHLORIDE 600; 310; 30; 20 MG/100ML; MG/100ML; MG/100ML; MG/100ML
100 INJECTION, SOLUTION INTRAVENOUS CONTINUOUS PRN
Status: DISCONTINUED | OUTPATIENT
Start: 2020-06-22 | End: 2020-06-23 | Stop reason: HOSPADM

## 2020-06-22 RX ORDER — OXYCODONE AND ACETAMINOPHEN 10; 325 MG/1; MG/1
1 TABLET ORAL EVERY 4 HOURS PRN
Status: DISCONTINUED | OUTPATIENT
Start: 2020-06-22 | End: 2020-06-23 | Stop reason: HOSPADM

## 2020-06-22 RX ADMIN — SODIUM CHLORIDE 75 ML/HR: 9 INJECTION, SOLUTION INTRAVENOUS at 15:08

## 2020-06-22 RX ADMIN — PROPOFOL 150 MCG/KG/MIN: 10 INJECTION, EMULSION INTRAVENOUS at 10:41

## 2020-06-22 RX ADMIN — SODIUM CHLORIDE, POTASSIUM CHLORIDE, SODIUM LACTATE AND CALCIUM CHLORIDE 20 ML/HR: 600; 310; 30; 20 INJECTION, SOLUTION INTRAVENOUS at 08:55

## 2020-06-22 RX ADMIN — FENTANYL CITRATE 25 MCG: 50 INJECTION, SOLUTION INTRAMUSCULAR; INTRAVENOUS at 13:30

## 2020-06-22 RX ADMIN — CEFAZOLIN SODIUM 1 G: 1 INJECTION, SOLUTION INTRAVENOUS at 18:44

## 2020-06-22 RX ADMIN — GABAPENTIN 800 MG: 400 CAPSULE ORAL at 21:54

## 2020-06-22 RX ADMIN — ZOLPIDEM TARTRATE 10 MG: 5 TABLET ORAL at 21:54

## 2020-06-22 RX ADMIN — HYDROMORPHONE HYDROCHLORIDE 1 MG: 1 INJECTION, SOLUTION INTRAMUSCULAR; INTRAVENOUS; SUBCUTANEOUS at 15:22

## 2020-06-22 RX ADMIN — PHENOL 2 SPRAY: 1.5 LIQUID ORAL at 15:22

## 2020-06-22 RX ADMIN — SODIUM CHLORIDE, POTASSIUM CHLORIDE, SODIUM LACTATE AND CALCIUM CHLORIDE 1000 ML: 600; 310; 30; 20 INJECTION, SOLUTION INTRAVENOUS at 08:52

## 2020-06-22 RX ADMIN — DEXAMETHASONE SODIUM PHOSPHATE 4 MG: 4 INJECTION, SOLUTION INTRAMUSCULAR; INTRAVENOUS at 09:36

## 2020-06-22 RX ADMIN — SUFENTANIL CITRATE 10 MCG: 50 INJECTION EPIDURAL; INTRAVENOUS at 11:13

## 2020-06-22 RX ADMIN — SODIUM CHLORIDE 75 ML/HR: 9 INJECTION, SOLUTION INTRAVENOUS at 15:03

## 2020-06-22 RX ADMIN — SUFENTANIL CITRATE 10 MCG: 50 INJECTION EPIDURAL; INTRAVENOUS at 10:40

## 2020-06-22 RX ADMIN — PROPOFOL 100 MG: 10 INJECTION, EMULSION INTRAVENOUS at 10:40

## 2020-06-22 RX ADMIN — OXYCODONE HYDROCHLORIDE AND ACETAMINOPHEN 2 TABLET: 7.5; 325 TABLET ORAL at 13:45

## 2020-06-22 RX ADMIN — CEFAZOLIN 2 G: 1 INJECTION, POWDER, FOR SOLUTION INTRAMUSCULAR; INTRAVENOUS; PARENTERAL at 10:55

## 2020-06-22 RX ADMIN — SUCCINYLCHOLINE CHLORIDE 60 MG: 20 INJECTION, SOLUTION INTRAMUSCULAR; INTRAVENOUS at 10:40

## 2020-06-22 RX ADMIN — MIDAZOLAM HYDROCHLORIDE 1 MG: 1 INJECTION, SOLUTION INTRAMUSCULAR; INTRAVENOUS at 09:36

## 2020-06-22 RX ADMIN — MIDAZOLAM HYDROCHLORIDE 1 MG: 1 INJECTION, SOLUTION INTRAMUSCULAR; INTRAVENOUS at 09:49

## 2020-06-22 RX ADMIN — FENTANYL CITRATE 25 MCG: 50 INJECTION, SOLUTION INTRAMUSCULAR; INTRAVENOUS at 13:24

## 2020-06-22 RX ADMIN — SUFENTANIL CITRATE 10 MCG: 50 INJECTION EPIDURAL; INTRAVENOUS at 11:07

## 2020-06-22 RX ADMIN — EPHEDRINE SULFATE 15 MG: 50 INJECTION INTRAVENOUS at 11:09

## 2020-06-22 RX ADMIN — SODIUM CHLORIDE, PRESERVATIVE FREE 3 ML: 5 INJECTION INTRAVENOUS at 21:56

## 2020-06-22 RX ADMIN — LIDOCAINE HYDROCHLORIDE 60 MG: 20 INJECTION, SOLUTION INTRAVENOUS at 10:40

## 2020-06-22 RX ADMIN — ONDANSETRON HYDROCHLORIDE 4 MG: 2 SOLUTION INTRAMUSCULAR; INTRAVENOUS at 14:18

## 2020-06-22 RX ADMIN — OXYCODONE HYDROCHLORIDE AND ACETAMINOPHEN 1 TABLET: 10; 325 TABLET ORAL at 21:54

## 2020-06-22 RX ADMIN — FENTANYL CITRATE 25 MCG: 50 INJECTION, SOLUTION INTRAMUSCULAR; INTRAVENOUS at 13:35

## 2020-06-22 RX ADMIN — FENTANYL CITRATE 25 MCG: 50 INJECTION, SOLUTION INTRAMUSCULAR; INTRAVENOUS at 13:40

## 2020-06-22 RX ADMIN — OXYCODONE HYDROCHLORIDE 20 MG: 20 TABLET, FILM COATED, EXTENDED RELEASE ORAL at 08:55

## 2020-06-22 RX ADMIN — ACETAMINOPHEN 1000 MG: 500 TABLET, FILM COATED ORAL at 09:36

## 2020-06-22 RX ADMIN — HYDROMORPHONE HYDROCHLORIDE 1 MG: 1 INJECTION, SOLUTION INTRAMUSCULAR; INTRAVENOUS; SUBCUTANEOUS at 18:46

## 2020-06-22 RX ADMIN — ROCURONIUM BROMIDE 5 MG: 10 INJECTION INTRAVENOUS at 10:40

## 2020-06-22 RX ADMIN — OXYCODONE HYDROCHLORIDE AND ACETAMINOPHEN 1 TABLET: 10; 325 TABLET ORAL at 17:33

## 2020-06-22 NOTE — ANESTHESIA POSTPROCEDURE EVALUATION
"Patient: Penny Gtz    Procedure Summary     Date:  06/22/20 Room / Location:   [facility]  /  [facility]    Anesthesia Start:  1033 Anesthesia Stop:  1308    Procedures:       EXPLORATION OF FUSION, C 5-6, ANTERIOR CERVICAL DISCECTOMY FUSION C4-5, C 6-7 (N/A Spine Cervical)      REMOVAL OF INSTRUMENTATION (N/A Spine Cervical) Diagnosis:  (M54.12)    Surgeon:  RAFFAELE Burks MD Provider:  Jing Guillermo CRNA    Anesthesia Type:  general ASA Status:  2          Anesthesia Type: general    Vitals  Vitals Value Taken Time   /58 6/22/2020  2:19 PM   Temp 97 °F (36.1 °C) 6/22/2020  2:05 PM   Pulse 58 6/22/2020  2:23 PM   Resp 12 6/22/2020  2:05 PM   SpO2 97 % 6/22/2020  2:23 PM   Vitals shown include unvalidated device data.        Post Anesthesia Care and Evaluation    Patient location during evaluation: PACU  Patient participation: complete - patient participated  Level of consciousness: awake and alert  Pain management: satisfactory to patient  Airway patency: patent  Anesthetic complications: No anesthetic complications    Cardiovascular status: acceptable  Respiratory status: acceptable  Hydration status: acceptable    Comments: Blood pressure 124/50, pulse 65, temperature 97.5 °F (36.4 °C), temperature source Oral, resp. rate 14, height 160 cm (62.99\"), weight 64.6 kg (142 lb 6.7 oz), SpO2 95 %, not currently breastfeeding.    Pt discharged from PACU based on florina score >8  No anesthesia care post op    "

## 2020-06-22 NOTE — PLAN OF CARE
Patient from PACU with dressing to neck- guaze and medipore tape- collar removed for inspection and skin palpitated, SCDs in place, up to BSC, tolerating PO and IV pain medications for pain control- ELISE drain in place with only drainage noted in tubing not bulb, continuous pulse ox in place, VSS, IVF going, will continue to monitor and notify MD of any changes

## 2020-06-22 NOTE — OP NOTE
CERVICAL DISCECTOMY ANTERIOR FUSION WITH INSTRUMENTATION, CERVICAL HARDWARE REMOVAL  Procedure Note    Penny Gtz  6/22/2020    Pre-op Diagnosis:     1. Status post previous ACDF C5-6, Dr. Finley, [place], [place], 11/01/2016.  2. Status post posterior cervical fusion C5-6, Dr. Finley, [facility], 04/06/2017.  3. Increasing chronic neck pain.  4. Bilateral periscapular radiculopathy.  5. Severe left shoulder and arm radiculopathy.  6. Adjacent-level degenerative disk disease C4-5, C6-7, severe C6-7.  7. Disk herniation left central C4-5.  8. Central and left-sided foraminal stenosis C4-5, C6-7.    Post-op Diagnosis:    same    Procedure/CPT® Codes:    1.  Removal of anterior instrumentation C5-6  2.  Exploration of fusion C5-6  3.  Anterior cervical discectomy with interbody fusion C4-5, C6-7  4.  Anterior spinal instrumentation C4-5, C6-7 (RTI anterior plate and screws x 2)  5.  Use of PEKK interbody biomechanical device for fusion C4-5, C6-7 (RTI PEKK spacers × 2)  6.  Use of locally obtained autograft bone for fusion  7.  Use of allograft bone chips for fusion  8.  Use of allograft liquid for fusion (BioBurst)  9.  Use of operating microscope for decompression and microdissection  10.  Use of fluoroscopy for confirmation of surgical level, placement of instrumentation and PEKK spacers  11.  Intraoperative neural monitoring    Anesthesia: General    Surgeon: ADAN Burks MD    Assistant: John Hall PA-C    Estimated Blood Loss: 50 mL    Complications: None    Condition: Stable to PACU.    Indications:    The patient is a 65-year-old who sees Saige Melara NP for medical issues.  She presented to the office with a history of a prior ACDF performed at C5-6 on 11/1/2016.  She also required a second posterior procedure on 4/6/2017, likely as a result of a pseudoarthrosis.  The patient presented to the office with increasing chronic neck pain along with bilateral periscapular  radiculopathy and severe left shoulder and arm radiculopathy.  Imaging studies revealed adjacent level degenerative disc disease above and below her prior fusion.  A central disc herniation was noted on the left side of C4-5 and both levels had central and severe left-sided foraminal stenosis C4-5 and C6-7.    After failing conservative measures, it was mutually decided that surgery would be the best option.  Risks, benefits, and complications of surgery were discussed with the patient.  The patient appeared well informed and wished to proceed.  We specifically discussed the risks of infection, blood loss, nerve root injury, CSF leak, spinal cord injury, and the possibility of incomplete resolution of symptoms.  We also discussed the possible risk of a nonunion and the potential need for additional surgery in the event of a pseudoarthrosis or hardware failure.    Operative Procedure:    After obtaining informed consent and verifying the correct operative levels, the patient was brought to the operating room and placed supine on an operating table.  A general anesthetic was provided by the anesthesia service with the assistance of an endotracheal tube.  Once this was properly positioned and secured, a bump was placed under the patient's shoulders placing the neck into a gentle extended position.  Head halter traction was then used with 10 pounds weight to maintain head position.  The shoulders were taped using 3 inch wide cloth tape to assist with radiographic visualization.  Fluoroscopy was used to identify the disc spaces of C4-5 and C6-7 as well as the existing instrumentation at C5-6, and the skin was marked for our planned incision.  The anterior neck region was then prepped and draped in usual sterile fashion.  A surgical timeout was taken to confirm this was the correct patient, we are working at the correct levels, and that preoperative antibiotics were given in a timely fashion.    A transverse incision was  then created over the anterior cervical region using a 10 blade scalpel directly centered over the levels of interest.  Dissection was carried sharply through subcutaneous tissues.  The platysma was divided in line with the incision and blunt dissection was carried along the medial border of the sternocleidomastoid muscle, lateral to the strap musculature the neck.  The carotid pulse was then palpated, and dissection was carried medial to the carotid sheath and lateral to the trachea and esophagus.  Handheld Cloward retractors along with Kitners were used to dissect through pretracheal and prevertebral fascia.  The existing instrumentation was easily identified at C5-6.  Bovie cautery was used to remove some scar tissue from the anterior aspect of the plate.  There was extensive bony overgrowth inferiorly at C6-7 below the plate.  The longus coli muscles were then elevated from either side of the spine at both C4-5 and C6-7 in preparation for work in those areas using Bovie cautery.    Once the plate was properly exposed, the appropriate triangle shaped screwdriver was used to turn the locking screws.  The screws were taken out of C5 and C6 without difficulty using the appropriate hexagon screwdriver.  After removing the plate the area was explored.  The fusion was felt to be solid.  Next a rongeur was used to remove some excess bone from below the plate at approximately the C6-7 level.    An initial discectomy was started at both C4-5 and C6-7 by creating an annulotomy with Bovie cautery.  A Richardson elevator was used to remove some of the disc material off of the endplates.  Disc material was initially removed using forward angled curettes and pituitaries.  Some additional anterior osteophytes were removed using a rongeur.  All retrieved bone was cleaned, morcellized and saved for later packing into the disc spaces to assist with obtaining a fusion.  [place] pins were then placed to allow gentle distraction across the  disc spaces.  The microscope was then positioned for the microdissection and decompression portion of the procedure.    The disc spaces of C4-5 and C6-7 were prepared in an identical fashion.  I used Kerrisons to remove remaining anterior osteophytes off of the endplates.  Straight curettes were used to remove the cartilaginous endplates and all remaining disc material.  The high-speed bur was then used to remove posterior osteophytes and to contour the endplates in preparation for fusion.  A forward angled curette was used to free up the posterior margins of the vertebral bodies, releasing the posterior longitudinal ligament.  Posterior osteophytes, posterior disc material, and the PLL were all resected using Kerrisons.  Kerrisons were also used to perform a bilateral neural foraminotomy.  At the end of the discectomy decompression, the central spinal canal and the exiting nerve roots at each level appeared to be free of compression.  Bleeding in the epidural space was controlled using thrombin and gelfoam.  The wound was then copiously irrigated with saline solution.    Next, trial spacers were tapped into position into each of the disc spaces.  Once the appropriate size was determined for each disc space, actual PEKK spacers matching the same size as the trials were delivered to the sterile field.  The spacers were packed as tightly as possible with a combination of locally obtained autograft bone and allograft bone chips mixed with an allograft liquid called BioBurst.  The spacers were then malleted into position into each of the disc spaces under fluoroscopic guidance.  They were placed as PEKK interbody biomechanical devices to assist with fusion.  Spacers were placed at both C4-5 and C6-7.    After confirming the PEKK spacers were properly positioned with fluoroscopy, the [place] pins were removed.  Remaining anterior osteophytes were contoured off of the vertebral bodies using a combination of the high-speed  bur and the Rongeur to allow for the best possible plate fixation.  An anterior plate from the RTI instrumentation set was then chosen to span each of the disc spaces individually, C4-5 and C6-7.  The plates were held into position using a series of screws.  2 screws were placed into C4 and 2 screws were placed into C5.  Similarly 2 screws were placed into C6 and 2 screws were placed into C7.    Final fluoroscopy imaging confirmed adequate position of the plates and screws as well as the PEKK spacers.  All implants appeared to be properly positioned with good maintenance of cervical alignment.  A final inspection of the operative field was then undertaken to ensure that we had adequate hemostasis.  Bleeding at this point was controlled with thrombin, gelfoam and bipolar cautery.  A drain was then placed anterior to the spine exiting through a poke hole inferior of the incision.    Closure was accomplished by reapproximating the platysma with a 3-0 Vicryl.  Immediate subcutaneous tissues were reapproximated with a 4-0 Vicryl in a buried fashion.  Final skin closure was accomplished with Mastisol and Steri-Strips.  The wound was then washed and a sterile Bioclusive dressing was applied.  The patient was then placed into a hard cervical collar, extubated, and sent to the recovery room in good stable condition.    The patient tolerated the procedure well.  There were no complications.  We estimated blood loss to be 50 mL.  Intraoperative neural monitoring was used during the procedure.  We used motor evoked potentials, free run EMG, and SSEPs.  There were no neuro monitoring signal changes during the procedure.    John Hall PA-C provided critical assistance during the procedure.  His assistance was medically necessary in order to allow the procedure to occur in the most safe and efficient manner.  He assisted with not only agent positioning and wound closure, but more importantly with retraction of delicate  neurovascular structures, assistance during the discectomy decompression, as well as the placement of the PEKK spacers and instrumentation to obtain a fusion.    ADAN Burks MD     Date: 6/22/2020  Time: 14:57

## 2020-06-22 NOTE — ANESTHESIA PROCEDURE NOTES
Airway  Urgency: elective    Date/Time: 6/22/2020 10:41 AM  Airway not difficult    General Information and Staff    Patient location during procedure: OR  CRNA: Jing Guillermo CRNA    Indications and Patient Condition  Indications for airway management: airway protection    Preoxygenated: yes  Mask difficulty assessment: 1 - vent by mask    Final Airway Details  Final airway type: endotracheal airway      Successful airway: ETT  Cuffed: yes   Successful intubation technique: direct laryngoscopy  Facilitating devices/methods: intubating stylet  Endotracheal tube insertion site: oral  Blade: Billy  Blade size: 3.5.  ETT size (mm): 7.0  Cormack-Lehane Classification: grade I - full view of glottis  Placement verified by: chest auscultation and capnometry   Cuff volume (mL): 5  Measured from: gums  ETT/EBT to gums (cm): 21  Number of attempts at approach: 1  Assessment: lips, teeth, and gum same as pre-op and atraumatic intubation

## 2020-06-22 NOTE — ANESTHESIA PREPROCEDURE EVALUATION
Anesthesia Evaluation     Patient summary reviewed   no history of anesthetic complications:  NPO Solid Status: > 8 hours  NPO Liquid Status: > 8 hours           Airway   Mallampati: I  TM distance: >3 FB  Neck ROM: limited  No difficulty expected  Dental    (+) lower dentures and upper dentures    Pulmonary    (+) a smoker (stopped cigs 02/2019, now uses jewl) Current, sleep apnea (does not use cpap),   (-) COPD (listed on chart, patient denies), asthma  Cardiovascular   Exercise tolerance: good (4-7 METS)    ECG reviewed    (+) hyperlipidemia,   (-) hypertension, past MI, CAD, dysrhythmias, CHF, cardiac stents      Neuro/Psych  (+) weakness (left upper extremity), numbness (left upper extremity),     (-) seizures, TIA, CVA  GI/Hepatic/Renal/Endo    (+)   hepatitis (carrier) B, renal disease CRI, thyroid problem hypothyroidism  (-) liver disease    Musculoskeletal     (+) chronic pain, neck pain,   Abdominal    Substance History      OB/GYN          Other   arthritis,                      Anesthesia Plan    ASA 2     general     intravenous induction

## 2020-06-23 VITALS
DIASTOLIC BLOOD PRESSURE: 60 MMHG | BODY MASS INDEX: 25.23 KG/M2 | WEIGHT: 142.42 LBS | OXYGEN SATURATION: 96 % | TEMPERATURE: 98.9 F | SYSTOLIC BLOOD PRESSURE: 101 MMHG | HEART RATE: 72 BPM | HEIGHT: 63 IN | RESPIRATION RATE: 16 BRPM

## 2020-06-23 LAB
ANION GAP SERPL CALCULATED.3IONS-SCNC: 12 MMOL/L (ref 5–15)
BASOPHILS # BLD AUTO: 0.01 10*3/MM3 (ref 0–0.2)
BASOPHILS NFR BLD AUTO: 0.1 % (ref 0–1.5)
BUN BLD-MCNC: 9 MG/DL (ref 8–23)
BUN/CREAT SERPL: 12.9 (ref 7–25)
CALCIUM SPEC-SCNC: 8.5 MG/DL (ref 8.6–10.5)
CHLORIDE SERPL-SCNC: 104 MMOL/L (ref 98–107)
CO2 SERPL-SCNC: 23 MMOL/L (ref 22–29)
CREAT BLD-MCNC: 0.7 MG/DL (ref 0.57–1)
DEPRECATED RDW RBC AUTO: 44.7 FL (ref 37–54)
EOSINOPHIL # BLD AUTO: 0 10*3/MM3 (ref 0–0.4)
EOSINOPHIL NFR BLD AUTO: 0 % (ref 0.3–6.2)
ERYTHROCYTE [DISTWIDTH] IN BLOOD BY AUTOMATED COUNT: 13 % (ref 12.3–15.4)
GFR SERPL CREATININE-BSD FRML MDRD: 84 ML/MIN/1.73
GLUCOSE BLD-MCNC: 120 MG/DL (ref 65–99)
HCT VFR BLD AUTO: 29.4 % (ref 34–46.6)
HGB BLD-MCNC: 10 G/DL (ref 12–15.9)
IMM GRANULOCYTES # BLD AUTO: 0.03 10*3/MM3 (ref 0–0.05)
IMM GRANULOCYTES NFR BLD AUTO: 0.3 % (ref 0–0.5)
LYMPHOCYTES # BLD AUTO: 1.42 10*3/MM3 (ref 0.7–3.1)
LYMPHOCYTES NFR BLD AUTO: 12.8 % (ref 19.6–45.3)
MCH RBC QN AUTO: 31.6 PG (ref 26.6–33)
MCHC RBC AUTO-ENTMCNC: 34 G/DL (ref 31.5–35.7)
MCV RBC AUTO: 93 FL (ref 79–97)
MONOCYTES # BLD AUTO: 0.9 10*3/MM3 (ref 0.1–0.9)
MONOCYTES NFR BLD AUTO: 8.1 % (ref 5–12)
NEUTROPHILS # BLD AUTO: 8.76 10*3/MM3 (ref 1.7–7)
NEUTROPHILS NFR BLD AUTO: 78.7 % (ref 42.7–76)
NRBC BLD AUTO-RTO: 0 /100 WBC (ref 0–0.2)
PLATELET # BLD AUTO: 221 10*3/MM3 (ref 140–450)
PMV BLD AUTO: 11.8 FL (ref 6–12)
POTASSIUM BLD-SCNC: 4 MMOL/L (ref 3.5–5.2)
RBC # BLD AUTO: 3.16 10*6/MM3 (ref 3.77–5.28)
SODIUM BLD-SCNC: 139 MMOL/L (ref 136–145)
WBC NRBC COR # BLD: 11.12 10*3/MM3 (ref 3.4–10.8)

## 2020-06-23 PROCEDURE — 97165 OT EVAL LOW COMPLEX 30 MIN: CPT | Performed by: OCCUPATIONAL THERAPIST

## 2020-06-23 PROCEDURE — 97161 PT EVAL LOW COMPLEX 20 MIN: CPT

## 2020-06-23 PROCEDURE — 25010000003 CEFAZOLIN 1-4 GM/50ML-% SOLUTION: Performed by: ORTHOPAEDIC SURGERY

## 2020-06-23 PROCEDURE — 80048 BASIC METABOLIC PNL TOTAL CA: CPT | Performed by: ORTHOPAEDIC SURGERY

## 2020-06-23 PROCEDURE — 85025 COMPLETE CBC W/AUTO DIFF WBC: CPT | Performed by: ORTHOPAEDIC SURGERY

## 2020-06-23 PROCEDURE — 25010000003 HYDROMORPHONE 1 MG/ML SOLUTION: Performed by: ORTHOPAEDIC SURGERY

## 2020-06-23 RX ORDER — HYDROCODONE BITARTRATE AND ACETAMINOPHEN 10; 325 MG/1; MG/1
1 TABLET ORAL EVERY 4 HOURS PRN
Qty: 60 TABLET | Refills: 0 | Status: SHIPPED | OUTPATIENT
Start: 2020-06-23

## 2020-06-23 RX ADMIN — ESTROGENS, CONJUGATED 0.6 MG: 0.3 TABLET, FILM COATED ORAL at 09:07

## 2020-06-23 RX ADMIN — SODIUM CHLORIDE, PRESERVATIVE FREE 3 ML: 5 INJECTION INTRAVENOUS at 09:08

## 2020-06-23 RX ADMIN — ESTRADIOL 1 MG: 1 TABLET ORAL at 09:07

## 2020-06-23 RX ADMIN — HYDROMORPHONE HYDROCHLORIDE 1 MG: 1 INJECTION, SOLUTION INTRAMUSCULAR; INTRAVENOUS; SUBCUTANEOUS at 02:11

## 2020-06-23 RX ADMIN — LEVOTHYROXINE SODIUM 100 MCG: 100 TABLET ORAL at 05:12

## 2020-06-23 RX ADMIN — OXYCODONE HYDROCHLORIDE AND ACETAMINOPHEN 1 TABLET: 10; 325 TABLET ORAL at 12:32

## 2020-06-23 RX ADMIN — FAMOTIDINE 20 MG: 20 TABLET, FILM COATED ORAL at 09:07

## 2020-06-23 RX ADMIN — POTASSIUM CHLORIDE 10 MEQ: 750 CAPSULE, EXTENDED RELEASE ORAL at 09:07

## 2020-06-23 RX ADMIN — CEFAZOLIN SODIUM 1 G: 1 INJECTION, SOLUTION INTRAVENOUS at 02:09

## 2020-06-23 RX ADMIN — FUROSEMIDE 20 MG: 20 TABLET ORAL at 09:07

## 2020-06-23 RX ADMIN — FLUOROMETHOLONE 1 DROP: 2.5 SUSPENSION/ DROPS OPHTHALMIC at 09:07

## 2020-06-23 RX ADMIN — CETIRIZINE HYDROCHLORIDE 10 MG: 10 TABLET, FILM COATED ORAL at 09:07

## 2020-06-23 RX ADMIN — OXYCODONE HYDROCHLORIDE AND ACETAMINOPHEN 1 TABLET: 10; 325 TABLET ORAL at 05:12

## 2020-06-23 NOTE — PLAN OF CARE
Problem: Patient Care Overview  Goal: Plan of Care Review  Flowsheets (Taken 6/23/2020 1033)  Outcome Summary: PT eval completed. She presents alert and oriented. She was educated on her neck precautions and c-collar use/sly/doff. She was supervision with gait and stair training. She ambulated 150 ft at a normal pace and ambulated up and down 9 steps with 1 HR. she is at her baseline mobillity and she had no further PT needs, questions or concerns. PT to sign off, I anticipate she will d.c home with assist.

## 2020-06-23 NOTE — THERAPY DISCHARGE NOTE
Acute Care - Occupational Therapy Initial Eval/Discharge  [facility]     Patient Name: Penny Gtz  : 1955  MRN: [id number]  Today's Date: 2020  Onset of Illness/Injury or Date of Surgery: 20  Date of Referral to OT: 20  Referring Physician: Dr. Burks      Admit Date: 2020       ICD-10-CM ICD-9-CM   1. Stenosis, cervical spine M48.02 723.0     Patient Active Problem List   Diagnosis   • Brow ptosis   • Tobacco abuse disorder   • Encounter for Papanicolaou smear of cervix   • Encounter for screening colonoscopy   • Screening mammogram, encounter for   • Encounter for screening for diabetes mellitus   • Encounter for screening for endocrine disorder   • Annual physical exam   • General medical examination   • Encounter for screening for cardiovascular disorders   • Other chronic pain   • Vitamin D deficiency    • Insomnia   • Chronic kidney disease, stage 2 (mild)   • Mixed hyperlipidemia   • Nicotine dependence, cigarettes, with other nicotine-induced disorders   • Bilateral carotid artery stenosis   • Stenosis, cervical spine     Past Medical History:   Diagnosis Date   • Arthritis    • Brow ptosis 2018   • COPD (chronic obstructive pulmonary disease) (CMS/HCC)    • Headache    • Hepatitis B carrier (CMS/HCC)    • Hiatal hernia    • History of transfusion    • Hypothyroidism    • Irritable bowel disease    • Migraines    • Nerve damage     legs bilateral   • Sleep apnea     UNABLE TO WEAR C PAP   • Visual field defect    • Vitiligo      Past Surgical History:   Procedure Laterality Date   • ANTERIOR CERVICAL DISCECTOMY W/ FUSION N/A 2020    Procedure: EXPLORATION OF FUSION, C 5-6, ANTERIOR CERVICAL DISCECTOMY FUSION C4-5, C 6-7;  Surgeon: RAFFAELE Burks MD;  Location: [facility];  Service: Orthopedic Spine;  Laterality: N/A;   • BACK SURGERY     • BROW LIFT Bilateral 3/13/2018    Procedure: Direct brow lift;  Surgeon: Jesús Garza MD;  Location: [facility] OR;   Service: ENT   •  SECTION     • HARDWARE REMOVAL N/A 2020    Procedure: REMOVAL OF INSTRUMENTATION;  Surgeon: RAFFAELE Burks MD;  Location: [facility] OR;  Service: Orthopedic Spine;  Laterality: N/A;   • HYSTERECTOMY     • NECK SURGERY     • SHOULDER SURGERY     • SINUS SURGERY     • SINUS SURGERY     • SPINE SURGERY     • WRIST SURGERY Right           OT ASSESSMENT FLOWSHEET (last 12 hours)      Occupational Therapy Evaluation     Row Name 20 0759                   OT Evaluation Time/Intention    Subjective Information  complains of;pain  -        Document Type  evaluation  -        Mode of Treatment  occupational therapy  -CH        Comment  1024  -           General Information    Patient Profile Reviewed?  yes  -CH        Onset of Illness/Injury or Date of Surgery  20  -        Referring Physician  Dr. Burks  -        Patient Observations  alert;cooperative;agree to therapy  -        Prior Level of Function  independent:;all household mobility;community mobility;ADL's  -        Equipment Currently Used at Home  shower chair  -        Pertinent History of Current Functional Problem   Removal of anterior instrumentation C5-6,   Exploration of fusion C5-6, Anterior cervical discectomy with interbody fusion C4-5, C6-7, Anterior spinal instrumentation C4-5, C6-7  -        Existing Precautions/Restrictions  fall;spinal  -CH        Risks Reviewed  patient:;LOB;increased discomfort  -        Benefits Reviewed  patient:;improve function;increase independence;increase strength;increase balance  -           Relationship/Environment    Primary Source of Support/Comfort  spouse  -        Lives With  spouse  -           Resource/Environmental Concerns    Current Living Arrangements  home/apartment/condo  -           Home Main Entrance    Number of Stairs, Main Entrance  three  -CH           Cognitive Assessment/Interventions    Additional Documentation  Cognitive  Assessment/Intervention (Group)  -           Cognitive Assessment/Intervention- PT/OT    Affect/Mental Status (Cognitive)  WNL  -        Orientation Status (Cognition)  oriented x 4  -        Follows Commands (Cognition)  WNL  -        Personal Safety Interventions  fall prevention program maintained;nonskid shoes/slippers when out of bed;supervised activity;muscle strengthening facilitated  -           Safety Issues, Functional Mobility    Impairments Affecting Function (Mobility)  pain  -CH           Bed Mobility Assessment/Treatment    Bed Mobility Assessment/Treatment  rolling right;sidelying-sit  -        Rolling Right [place] (Bed Mobility)  conditional independence  -        Sidelying-Sit [place] (Bed Mobility)  conditional independence  -           Functional Mobility    Functional Mobility- Ind. Level  conditional independence  -        Functional Mobility- Comment  in room   -           Transfer Assessment/Treatment    Transfer Assessment/Treatment  sit-stand transfer;stand-sit transfer  -           Sit-Stand Transfer    Sit-Stand [place] (Transfers)  independent  -           Stand-Sit Transfer    Stand-Sit [place] (Transfers)  independent  -           ADL Assessment/Intervention    BADL Assessment/Intervention  upper body dressing;lower body dressing  -           Upper Body Dressing Assessment/Training    Upper Body Dressing [place] Level  independent;doff;front opening garment  -        Upper Body Dressing Position  edge of bed sitting  -           Lower Body Dressing Assessment/Training    Lower Body Dressing [place] Level  independent;don;pants/bottoms;shoes/slippers;socks  -        Lower Body Dressing Position  edge of bed sitting  -           BADL Safety/Performance    Impairments, BADL Safety/Performance  pain  -           General ROM    GENERAL ROM COMMENTS  BUE AROM - which pt. states is an improvement at L UE as compared to prior  to sx  -CH           MMT (Manual Muscle Testing)    General MMT Comments  no formal testing completed 2' lifting restriction but obs good fxl strength  -CH           Motor Assessment/Interventions    Additional Documentation  Balance (Group);Fine Motor Testing & Training (Group)  -           Balance    Balance  static sitting balance;static standing balance;dynamic sitting balance;dynamic standing balance  -           Static Sitting Balance    Level of [place] (Unsupported Sitting, Static Balance)  independent  -CH        Sitting Position (Unsupported Sitting, Static Balance)  sitting on edge of bed  -           Dynamic Sitting Balance    Level of [place], Reaches Outside Midline (Sitting, Dynamic Balance)  independent  -CH        Sitting Position, Reaches Outside Midline (Sitting, Dynamic Balance)  sitting on edge of bed  -           Static Standing Balance    Level of [place] (Supported Standing, Static Balance)  conditional independence  -           Dynamic Standing Balance    Level of [place], Reaches Outside Midline (Standing, Dynamic Balance)  conditional independence  -           Fine Motor Testing & Training    Comment, Fine Motor Coordination  WNL  -           Sensory Assessment/Intervention    Sensory General Assessment  no sensation deficits identified  -           Positioning and Restraints    Pre-Treatment Position  in bed  -CH        Post Treatment Position  bed  -CH        In Bed  notified nsg;sitting EOB;call light within reach;encouraged to call for assist;side rails up x2  -CH           Pain Assessment    Additional Documentation  Pain Scale: Numbers Pre/Post-Treatment (Group)  -CH           Pain Scale: Numbers Pre/Post-Treatment    Pain Scale: Numbers, Pretreatment  5/10  -CH        Pain Scale: Numbers, Post-Treatment  5/10  -CH        Pain Location  neck  -        Pain Intervention(s)  Medication (See MAR);Ambulation/increased activity;Repositioned  -            Orthotics & Prosthetics Management    Orthosis Location  spinal orthosis  -        Additional Documentation  Orthosis Location (Row)  -           Spinal Orthosis Management    Type (Spinal Orthosis)  cervical collar, hard  -        Functional Design (Spinal Orthosis)  static orthosis  -        Wearing Schedule (Spinal Orthosis)  wear full time  -        Orthosis Training (Spinal Orthosis)  patient;all orthosis skills  -           Wound 06/22/20 1208 neck Incision    Wound - Properties Group Date first assessed: 06/22/20  - Time first assessed: 1208  -HT Present on Hospital Admission: N  -HT Location: neck  -HT Primary Wound Type: Incision  -HT       Plan of Care Review    Plan of Care Reviewed With  patient  -           Clinical Impression (OT)    Date of Referral to OT  06/22/20  -        Criteria for Skilled Therapeutic Interventions Met (OT Eval)  no;treatment indicated  -        Therapy Frequency (OT Eval)  evaluation only  -        Anticipated Discharge Disposition (OT)  home with assist  -           Discharge Summary (Occupational Therapy)    Additional Documentation  Discharge Summary, OT Eval (Group)  -           Discharge Summary, OT Eval    Reason for Discharge (OT Discharge Summary)  no further needs identified  -           Living Environment    Home Accessibility  stairs to enter home;tub/shower is not walk in  -          User Key  (r) = Recorded By, (t) = Taken By, (c) = Cosigned By    Initials Name Effective Dates     Clarita Ludwig, OTR/L 08/02/16 -      Cailin Beck RN 08/02/16 -               OT Recommendation and Plan  Outcome Summary/Treatment Plan (OT)  Anticipated Discharge Disposition (OT): home with assist  Reason for Discharge (OT Discharge Summary): no further needs identified  Therapy Frequency (OT Eval): evaluation only  Plan of Care Review  Plan of Care Reviewed With: patient  Plan of Care Reviewed With: patient         Outcome Measures     Row  Name 06/23/20 1100             How much help from another is currently needed...    Putting on and taking off regular lower body clothing?  4  -CH      Bathing (including washing, rinsing, and drying)  4  -CH      Toileting (which includes using toilet bed pan or urinal)  4  -CH      Putting on and taking off regular upper body clothing  4  -CH      Taking care of personal grooming (such as brushing teeth)  4  -CH      Eating meals  4  -CH      AM-PAC 6 Clicks Score (OT)  24  -CH         Functional Assessment    Outcome Measure Options  AM-PAC 6 Clicks Daily Activity (OT)  -        User Key  (r) = Recorded By, (t) = Taken By, (c) = Cosigned By    Initials Name Provider Type    Clarita Lira OTR/L Occupational Therapist          Time Calculation:   Time Calculation- OT     Row Name 06/23/20 1024             Time Calculation- OT    OT Start Time  1024  -      OT Stop Time  1049  -      OT Time Calculation (min)  25 min  -      OT Received On  06/23/20  -        User Key  (r) = Recorded By, (t) = Taken By, (c) = Cosigned By    Initials Name Provider Type    Clarita Lira OTR/L Occupational Therapist        Therapy Suggested Charges     Code   Minutes Charges    None           Therapy Charges for Today     Code Description Service Date Service Provider Modifiers Qty    [id number]  OT EVAL LOW COMPLEXITY 2 6/23/2020 Clarita Ludwig OTR/L GO 1               OT Discharge Summary  Anticipated Discharge Disposition (OT): home with assist    DREA Pérez/PAU  6/23/2020

## 2020-06-23 NOTE — PLAN OF CARE
Problem: Patient Care Overview  Goal: Plan of Care Review  Outcome: Ongoing (interventions implemented as appropriate)  Note:   VSS, BP improved since this AM, pain controlled at this time with current PO pain medication, drain removed and tolerated well, dressing changed to gauze and teg twice, pt ambulated well without assist, pt has no further questions or complaints at this time, anxious to discharge, will continue to monitor.

## 2020-06-23 NOTE — PLAN OF CARE
Problem: Patient Care Overview  Goal: Plan of Care Review  Flowsheets  Taken 6/23/2020 0407 by Cris Coyle RN  Progress: no change  Taken 6/23/2020 1033 by Matt Richards PT DPT  Plan of Care Reviewed With: patient  Note:   OT eval completed.  Ms. Gtz is AxO x4 & was able to perform TBD, ambulation, t/fs, & was edu'd in c collar fit/management/care.  She requires no further OT tx, anticipate DC home with spouse.

## 2020-06-23 NOTE — PLAN OF CARE
Problem: Patient Care Overview  Goal: Plan of Care Review  Outcome: Ongoing (interventions implemented as appropriate)  Flowsheets (Taken 6/23/2020 0401)  Progress: no change  Plan of Care Reviewed With: patient  Outcome Summary: Patient c/o pain, prn pain meds given as ordered. No neuro changes, A&O X4, CACERES. Dressing to neck CDI. VSS, Safety maintained.

## 2020-06-23 NOTE — THERAPY DISCHARGE NOTE
Patient Name: Penny Gtz  : 1955    MRN: [id number]                              Today's Date: 2020       Admit Date: 2020    Visit Dx:     ICD-10-CM ICD-9-CM   1. Stenosis, cervical spine M48.02 723.0     Patient Active Problem List   Diagnosis   • Brow ptosis   • Tobacco abuse disorder   • Encounter for Papanicolaou smear of cervix   • Encounter for screening colonoscopy   • Screening mammogram, encounter for   • Encounter for screening for diabetes mellitus   • Encounter for screening for endocrine disorder   • Annual physical exam   • General medical examination   • Encounter for screening for cardiovascular disorders   • Other chronic pain   • Vitamin D deficiency    • Insomnia   • Chronic kidney disease, stage 2 (mild)   • Mixed hyperlipidemia   • Nicotine dependence, cigarettes, with other nicotine-induced disorders   • Bilateral carotid artery stenosis   • Stenosis, cervical spine     Past Medical History:   Diagnosis Date   • Arthritis    • Brow ptosis 2018   • COPD (chronic obstructive pulmonary disease) (CMS/HCC)    • Headache    • Hepatitis B carrier (CMS/HCC)    • Hiatal hernia    • History of transfusion    • Hypothyroidism    • Irritable bowel disease    • Migraines    • Nerve damage     legs bilateral   • Sleep apnea     UNABLE TO WEAR C PAP   • Visual field defect    • Vitiligo      Past Surgical History:   Procedure Laterality Date   • ANTERIOR CERVICAL DISCECTOMY W/ FUSION N/A 2020    Procedure: EXPLORATION OF FUSION, C 5-6, ANTERIOR CERVICAL DISCECTOMY FUSION C4-5, C 6-7;  Surgeon: RAFFAELE Burks MD;  Location: [facility] OR;  Service: Orthopedic Spine;  Laterality: N/A;   • BACK SURGERY     • BROW LIFT Bilateral 3/13/2018    Procedure: Direct brow lift;  Surgeon: Jesús Garza MD;  Location: [facility] OR;  Service: ENT   •  SECTION     • HARDWARE REMOVAL N/A 2020    Procedure: REMOVAL OF INSTRUMENTATION;  Surgeon: RAFFAELE Burks MD;  Location:  [facility] OR;  Service: Orthopedic Spine;  Laterality: N/A;   • HYSTERECTOMY     • NECK SURGERY     • SHOULDER SURGERY     • SINUS SURGERY     • SINUS SURGERY     • SPINE SURGERY     • WRIST SURGERY Right      General Information     Row Name 06/23/20 1033          PT Evaluation Time/Intention    Document Type  evaluation;discharge evaluation/summary s/p 6/22 removal of ant. instrumentation C5-6, exploration of fusion C5-6, ACDF C4-5, C6-7  -MARCELINA     Mode of Treatment  physical therapy  -MARCELINA     Row Name 06/23/20 1033          General Information    Patient Profile Reviewed?  yes  -MARCELINA     Prior Level of Function  independent:;all household mobility;community mobility;ADL's  -MARCELINA     Existing Precautions/Restrictions  fall;spinal c-collar at all times  -MARCELINA     Barriers to Rehab  none identified  -MARCELINA     Row Name 06/23/20 1033          Relationship/Environment    Lives With  spouse  -MARCELINA     Row Name 06/23/20 1033          Resource/Environmental Concerns    Current Living Arrangements  home/apartment/condo  -MARCELINA     Row Name 06/23/20 1033          Home Main Entrance    Number of Stairs, Main Entrance  three  -MARCELINA     Row Name 06/23/20 1033          Cognitive Assessment/Intervention- PT/OT    Orientation Status (Cognition)  oriented x 4  -MARCELINA     Personal Safety Interventions  fall prevention program maintained;nonskid shoes/slippers when out of bed;supervised activity  -MARCELINA     Row Name 06/23/20 1033          Safety Issues, Functional Mobility    Impairments Affecting Function (Mobility)  pain  -MARCELINA       User Key  (r) = Recorded By, (t) = Taken By, (c) = Cosigned By    Initials Name Provider Type    Matt Peterson, PT DPT Physical Therapist        Mobility     Row Name 06/23/20 1033          Bed Mobility Assessment/Treatment    Bed Mobility Assessment/Treatment  rolling right;sidelying-sit  -MARCELINA     Rolling Right [place] (Bed Mobility)  conditional independence  -MARCELINA     Sidelying-Sit [place] (Bed Mobility)   conditional independence  -MARCELINA     Row Name 06/23/20 1033          Sit-Stand Transfer    Sit-Stand [place] (Transfers)  supervision  -MARCELINA     Row Name 06/23/20 1033          Gait/Stairs Assessment/Training    [place] Level (Gait)  supervision  -MARCELINA     Distance in Feet (Gait)  150  -MARCELINA     Pattern (Gait)  swing-through  -MARCELINA     [place] Level (Stairs)  supervision  -MARCELINA     Handrail Location (Stairs)  right side (ascending)  -MARCELINA     Number of Steps (Stairs)  9  -MARCELINA     Ascending Technique (Stairs)  step-over-step  -MARCELINA     Descending Technique (Stairs)  step-over-step  -MARCELINA       User Key  (r) = Recorded By, (t) = Taken By, (c) = Cosigned By    Initials Name Provider Type    MARCELINA Matt Richards, PT DPT Physical Therapist        Obj/Interventions     [facility] Name 06/23/20 1033          General ROM    GENERAL ROM COMMENTS  B LE AROM WFL  -MARCELINA     Row Name 06/23/20 1033          MMT (Manual Muscle Testing)    General MMT Comments  B LE functionally 4+/5  -MARCELINA     Row Name 06/23/20 1033          Static Sitting Balance    Level of [place] (Unsupported Sitting, Static Balance)  independent  -MARCELINA     Sitting Position (Unsupported Sitting, Static Balance)  sitting on edge of bed  -MARCELINA     Row Name 06/23/20 1033          Dynamic Sitting Balance    Level of [place], Reaches Outside Midline (Sitting, Dynamic Balance)  independent  -MARCELINA     Sitting Position, Reaches Outside Midline (Sitting, Dynamic Balance)  sitting on edge of bed  -MARCELINA     Row Name 06/23/20 1033          Static Standing Balance    Level of [place] (Supported Standing, Static Balance)  supervision  -MARCELINA     Row Name 06/23/20 1033          Dynamic Standing Balance    Level of [place], Reaches Outside Midline (Standing, Dynamic Balance)  supervision  -MARCELINA     Row Name 06/23/20 1033          Sensory Assessment/Intervention    Sensory General Assessment  no sensation deficits identified reports B LE intact to light touch  -MARCELINA       User Key  (r) =  Recorded By, (t) = Taken By, (c) = Cosigned By    Initials Name Provider Type    Matt Peterson, PT DPT Physical Therapist        Goals/Plan    No documentation.       Clinical Impression     Row Name 06/23/20 1033          Pain Assessment    Additional Documentation  Pain Scale: Numbers Pre/Post-Treatment (Group)  -MARCELINA     Row Name 06/23/20 1033          Pain Scale: Numbers Pre/Post-Treatment    Pain Scale: Numbers, Pretreatment  5/10  -MARCELINA     Pain Location  neck  -MARCELINA     Pain Intervention(s)  Repositioned;Ambulation/increased activity  -MARCELINA     [facility] Name 06/23/20 1033          Plan of Care Review    Plan of Care Reviewed With  patient  -MARCELINA     Row Name 06/23/20 1033          Physical Therapy Clinical Impression    Patient/Family Goals Statement (PT Clinical Impression)  go home  -MARCELINA     Criteria for Skilled Interventions Met (PT Clinical Impression)  no;no problems identified which require skilled intervention;current level of function same as previous level of function  -MARCELINA     Row Name 06/23/20 1033          Positioning and Restraints    Pre-Treatment Position  in bed  -MARCELINA     Post Treatment Position  bed  -MARCELINA     In Bed  notified nsg;sitting EOB;call light within reach;encouraged to call for assist  -MARCELINA       User Key  (r) = Recorded By, (t) = Taken By, (c) = Cosigned By    Initials Name Provider Type    Matt Peterson, PT DPT Physical Therapist        Outcome Measures     [facility] Name 06/23/20 1033          How much help from another person do you currently need...    Turning from your back to your side while in flat bed without using bedrails?  4  -MARCELINA     Moving from lying on back to sitting on the side of a flat bed without bedrails?  4  -MARCELINA     Moving to and from a bed to a chair (including a wheelchair)?  4  -MARCELINA     Standing up from a chair using your arms (e.g., wheelchair, bedside chair)?  4  -MARCELINA     Climbing 3-5 steps with a railing?  4  -MARCELINA     To walk in hospital room?  4  -MARCELINA     AM-PAC 6 Clicks  Score (PT)  24  -MARCELINA     Row Name 06/23/20 1033          Functional Assessment    Outcome Measure Options  AM-PAC 6 Clicks Basic Mobility (PT)  -MARCELINA       User Key  (r) = Recorded By, (t) = Taken By, (c) = Cosigned By    Initials Name Provider Type    Matt Peterson PT DPT Physical Therapist        Physical Therapy Education                 Title: PT OT SLP Therapies (Resolved)     Topic: Physical Therapy (Resolved)     Point: Mobility training (Resolved)     Description:   Instruct learner(s) on safety and technique for assisting patient out of bed, chair or wheelchair.  Instruct in the proper use of assistive devices, such as walker, crutches, cane or brace.              Patient Friendly Description:   It's important to get you on your feet again, but we need to do so in a way that is safe for you. Falling has serious consequences, and your personal safety is the most important thing of all.        When it's time to get out of bed, one of us or a family member will sit next to you on the bed to give you support.     If your doctor or nurse tells you to use a walker, crutches, a cane, or a brace, be sure you use it every time you get out of bed, even if you think you don't need it.    Learning Progress Summary           Patient Acceptance, RUDDY FISH DU by MARCELINA at 6/23/2020 1033    Comment:  Educated pt on benefits of activity, neck prec, c-collar use/sly/doff                   Point: Precautions (Resolved)     Description:   Instruct learner(s) on prescribed precautions during mobility and gait tasks              Learning Progress Summary           Patient Acceptance, RUDDY FISH DU by MARCELINA at 6/23/2020 1033    Comment:  Educated pt on benefits of activity, neck prec, c-collar use/sly/doff                               User Key     Initials Effective Dates Name Provider Type Arsen HEWITT 08/02/16 -  Matt Richards, PT DPT Physical Therapist PT              PT Recommendation and Plan     Outcome Summary/Treatment  Plan (PT)  Anticipated Discharge Disposition (PT): home with assist  Plan of Care Reviewed With: patient  Outcome Summary: PT lucia completed. She presents alert and oriented. She was educated on her neck precautions and c-collar use/sly/doff. She was supervision with gait and stair training. She ambulated 150 ft at a normal pace and ambulated up and down 9 steps with 1 HR. she is at her baseline mobillity and she had no further PT needs, questions or concerns. PT to sign off, I anticipate she will d.c home with assist.     Time Calculation:   PT Charges     Row Name 06/23/20 1102             Time Calculation    Start Time  1033  -MARCELINA      Stop Time  1102 + 10 minutes with chart review, PT with 39 total minutes.   -MARCELINA      Time Calculation (min)  29 min  -MARCELINA      PT Received On  06/23/20  -MARCELINA        User Key  (r) = Recorded By, (t) = Taken By, (c) = Cosigned By    Initials Name Provider Type    Matt Peterson, PT DPT Physical Therapist        Therapy Charges for Today     Code Description Service Date Service Provider Modifiers Qty    [id number]  PT LUCIA LOW COMPLEXITY 3 6/23/2020 Matt Richards, PT DPT GP 1          PT G-Codes  Outcome Measure Options: AM-PAC 6 Clicks Basic Mobility (PT)  AM-PAC 6 Clicks Score (PT): 24    PT Discharge Summary  Anticipated Discharge Disposition (PT): home with assist    Matt Richards, JOHN DPT  6/23/2020

## 2020-06-23 NOTE — DISCHARGE SUMMARY
[facility]  SPINE SURGERY  HANANE Matthews  DISCHARGE SUMMARY  Patient ID:  Penny Gtz  [id number]  65 y.o.  1955    Admit date: 6/22/2020    Discharge date and time: 6/23/2020    Admitting Physician: RAFFAELE Burks MD     Indication for Admission: Planned admit for surgical procedure    Admission Diagnoses: Stenosis, cervical spine [M48.02]    Discharge Diagnoses: Stenosis, cervical spine [M48.02]    Procedures: Revision ACF C4-C6    Problem List:   Stenosis, cervical spine      Consults: none    Admission Condition: stable    Discharged Condition: stable    Hospital Course: no immediate post operative complication     Disposition: home    Patient Instructions:      Discharge Medications      Changes to Medications      Instructions Start Date   gabapentin 800 MG tablet  Commonly known as:  Neurontin  What changed:    · when to take this  · additional instructions   800 mg, Oral, 3 Times Daily      HYDROcodone-acetaminophen  MG per tablet  Commonly known as:  NORCO  What changed:    · how to take this  · reasons to take this   1 tablet, Oral, Every 4 Hours PRN      lidocaine 5 %  Commonly known as:  Lidoderm  What changed:  additional instructions   1 patch, Transdermal, Every 24 Hours, Remove & Discard patch within 12 hours or as directed by MD      zolpidem 5 MG tablet  Commonly known as:  Ambien  What changed:    · how much to take  · when to take this   5 mg, Oral, Nightly PRN         Continue These Medications      Instructions Start Date   aspirin 81 MG chewable tablet   81 mg, Oral, Daily      atorvastatin 20 MG tablet  Commonly known as:  LIPITOR   TAKE ONE TABLET BY MOUTH DAILY      Cyanocobalamin 1000 MCG/ML kit   Injection, Every 30 Days      estradiol 1 MG tablet  Commonly known as:  ESTRACE   TAKE ONE TABLET BY MOUTH DAILY      fluorometholone 0.1 % ophthalmic suspension  Commonly known as:  FML   0.1 drops, Both Eyes, 2 Times Daily       furosemide 20 MG tablet  Commonly known as:  LASIX   20 mg, Oral, Daily, TAKES 1 AND 1/2 TABLETS DAILY      HM Cetirizine HCl 10 MG tablet  Generic drug:  cetirizine   10 mg, Oral, Daily      levothyroxine 100 MCG tablet  Commonly known as:  SYNTHROID, LEVOTHROID   100 mcg, Oral, Daily      montelukast 10 MG tablet  Commonly known as:  SINGULAIR   10 mg, Oral, Daily      Potassium Gluconate  MG tablet controlled-release   595 mg, Oral, Daily      Premarin 0.625 MG tablet  Generic drug:  estrogens (conjugated)   0.625 mg, Oral, Daily           Activity: Avoid bending lifting or twisting, brace at all times except eating and hygiene, no driving while taking narcotic pain medication, walk 10-15 minutes 2-3 times daily  Diet: regular diet  Wound Care: keep wound clean and dry  Other: Contact [facility] office with questions or concerns    Follow-up with Dr Burks or PA's in 2 weeks.    Electronically signed by HANANE Matthews 07:09 6/23/2020

## 2020-06-24 ENCOUNTER — READMISSION MANAGEMENT (OUTPATIENT)
Dept: CALL CENTER | Facility: HOSPITAL | Age: 65
End: 2020-06-24

## 2020-06-24 NOTE — OUTREACH NOTE
Prep Survey      Responses   [facility] facility patient discharged from?  [place]   Is LACE score < 7 ?  No   Eligibility  Readm Mgmt   Discharge diagnosis  Revision ACF C4-C6   COVID-19 Test Status  Negative   Does the patient have one of the following disease processes/diagnoses(primary or secondary)?  General Surgery   Does the patient have Home health ordered?  No   Is there a DME ordered?  No   Comments regarding appointments  see AVS   Prep survey completed?  Yes          Peg Hurt RN

## 2020-06-25 ENCOUNTER — READMISSION MANAGEMENT (OUTPATIENT)
Dept: CALL CENTER | Facility: HOSPITAL | Age: 65
End: 2020-06-25

## 2020-06-25 NOTE — OUTREACH NOTE
General Surgery Week 1 Survey      Responses   [facility] patient discharged from?  [place]   Does the patient have one of the following disease processes/diagnoses(primary or secondary)?  General Surgery   Is there a successful TCM telephone encounter documented?  No   Week 1 attempt successful?  Yes   Call start time  1706   Call end time  1709   Discharge diagnosis  Revision ACF C4-C6   Meds reviewed with patient/caregiver?  Yes   Is the patient having any side effects they believe may be caused by any medication additions or changes?  No   Does the patient have all medications related to this admission filled (includes all antibiotics, pain medications, etc.)  Yes   Is the patient taking all medications as directed (includes completed medication regime)?  Yes   Does the patient have a follow up appointment scheduled with their surgeon?  Yes   Has the patient kept scheduled appointments due by today?  N/A   Has home health visited the patient within 72 hours of discharge?  N/A   Psychosocial issues?  No   Did the patient receive a copy of their discharge instructions?  Yes   Nursing interventions  Reviewed instructions with patient   What is the patient's perception of their health status since discharge?  Improving   Nursing interventions  Nurse provided patient education   Is the patient /caregiver able to teach back basic post-op care?  Continue use of incentive spirometry at least 1 week post discharge, Practice 'cough and deep breath', Drive as instructed by MD in discharge instructions, Do not remove steri-strips, Lifting as instructed by MD in discharge instructions   Is the patient/caregiver able to teach back signs and symptoms of incisional infection?  Increased redness, swelling or pain at the incisonal site, Increased drainage or bleeding, Incisional warmth, Pus or odor from incision, Fever   Is the patient/caregiver able to teach back steps to recovery at home?  Set small, achievable goals for  return to baseline health, Rest and rebuild strength, gradually increase activity, Eat a well-balance diet, Make a list of questions for surgeon's appointment   Is the patient/caregiver able to teach back the hierarchy of who to call/visit for symptoms/problems? PCP, Specialist, Home health nurse, Urgent Care, ED, 911  Yes   Additional teach back comments  Wearing collar, no oozing, odor, drainage.  States alot of pain, will discuss additional meds with PCP.   Week 1 call completed?  Yes   Wrap up additional comments  Inquired on length of time to wear brace.          Mony Burgess RN

## 2021-04-16 ENCOUNTER — APPOINTMENT (OUTPATIENT)
Dept: CT IMAGING | Facility: HOSPITAL | Age: 66
End: 2021-04-16

## 2021-04-16 ENCOUNTER — OFFICE VISIT (OUTPATIENT)
Dept: GASTROENTEROLOGY | Facility: CLINIC | Age: 66
End: 2021-04-16

## 2021-04-16 ENCOUNTER — HOSPITAL ENCOUNTER (EMERGENCY)
Facility: HOSPITAL | Age: 66
Discharge: HOME | End: 2021-04-16
Attending: FAMILY MEDICINE | Admitting: FAMILY MEDICINE

## 2021-04-16 VITALS
RESPIRATION RATE: 18 BRPM | TEMPERATURE: 97.8 F | HEART RATE: 68 BPM | OXYGEN SATURATION: 100 % | SYSTOLIC BLOOD PRESSURE: 125 MMHG | DIASTOLIC BLOOD PRESSURE: 57 MMHG | BODY MASS INDEX: 23.92 KG/M2 | HEIGHT: 63 IN | WEIGHT: 135 LBS

## 2021-04-16 VITALS
BODY MASS INDEX: 23.32 KG/M2 | WEIGHT: 131.6 LBS | SYSTOLIC BLOOD PRESSURE: 142 MMHG | DIASTOLIC BLOOD PRESSURE: 77 MMHG | HEART RATE: 58 BPM | HEIGHT: 63 IN

## 2021-04-16 DIAGNOSIS — R19.7 DIARRHEA, UNSPECIFIED TYPE: ICD-10-CM

## 2021-04-16 DIAGNOSIS — R10.84 ABDOMINAL PAIN, GENERALIZED: Primary | ICD-10-CM

## 2021-04-16 DIAGNOSIS — R10.13 EPIGASTRIC PAIN: Primary | ICD-10-CM

## 2021-04-16 LAB
ALBUMIN SERPL-MCNC: 4 G/DL (ref 3.5–5.2)
ALBUMIN/GLOB SERPL: 1.7 G/DL
ALP SERPL-CCNC: 108 U/L (ref 39–117)
ALT SERPL W P-5'-P-CCNC: 20 U/L (ref 1–33)
ANION GAP SERPL CALCULATED.3IONS-SCNC: 9 MMOL/L (ref 5–15)
AST SERPL-CCNC: 22 U/L (ref 1–32)
BASOPHILS # BLD AUTO: 0.06 10*3/MM3 (ref 0–0.2)
BASOPHILS NFR BLD AUTO: 0.8 % (ref 0–1.5)
BILIRUB SERPL-MCNC: 0.7 MG/DL (ref 0–1.2)
BILIRUB UR QL STRIP: NEGATIVE
BUN SERPL-MCNC: 13 MG/DL (ref 8–23)
BUN/CREAT SERPL: 17.3 (ref 7–25)
CALCIUM SPEC-SCNC: 9.3 MG/DL (ref 8.6–10.5)
CHLORIDE SERPL-SCNC: 110 MMOL/L (ref 98–107)
CLARITY UR: CLEAR
CO2 SERPL-SCNC: 27 MMOL/L (ref 22–29)
COLOR UR: YELLOW
CREAT SERPL-MCNC: 0.75 MG/DL (ref 0.57–1)
DEPRECATED RDW RBC AUTO: 42.3 FL (ref 37–54)
EOSINOPHIL # BLD AUTO: 0.12 10*3/MM3 (ref 0–0.4)
EOSINOPHIL NFR BLD AUTO: 1.5 % (ref 0.3–6.2)
ERYTHROCYTE [DISTWIDTH] IN BLOOD BY AUTOMATED COUNT: 12.4 % (ref 12.3–15.4)
GFR SERPL CREATININE-BSD FRML MDRD: 77 ML/MIN/1.73
GLOBULIN UR ELPH-MCNC: 2.4 GM/DL
GLUCOSE SERPL-MCNC: 116 MG/DL (ref 65–99)
GLUCOSE UR STRIP-MCNC: NEGATIVE MG/DL
HCT VFR BLD AUTO: 34.2 % (ref 34–46.6)
HGB BLD-MCNC: 11.1 G/DL (ref 12–15.9)
HGB UR QL STRIP.AUTO: NEGATIVE
HOLD SPECIMEN: NORMAL
IMM GRANULOCYTES # BLD AUTO: 0.02 10*3/MM3 (ref 0–0.05)
IMM GRANULOCYTES NFR BLD AUTO: 0.3 % (ref 0–0.5)
KETONES UR QL STRIP: NEGATIVE
LEUKOCYTE ESTERASE UR QL STRIP.AUTO: NEGATIVE
LIPASE SERPL-CCNC: 35 U/L (ref 13–60)
LYMPHOCYTES # BLD AUTO: 1.54 10*3/MM3 (ref 0.7–3.1)
LYMPHOCYTES NFR BLD AUTO: 19.4 % (ref 19.6–45.3)
MCH RBC QN AUTO: 30.2 PG (ref 26.6–33)
MCHC RBC AUTO-ENTMCNC: 32.5 G/DL (ref 31.5–35.7)
MCV RBC AUTO: 93.2 FL (ref 79–97)
MONOCYTES # BLD AUTO: 0.59 10*3/MM3 (ref 0.1–0.9)
MONOCYTES NFR BLD AUTO: 7.4 % (ref 5–12)
NEUTROPHILS NFR BLD AUTO: 5.6 10*3/MM3 (ref 1.7–7)
NEUTROPHILS NFR BLD AUTO: 70.6 % (ref 42.7–76)
NITRITE UR QL STRIP: NEGATIVE
NRBC BLD AUTO-RTO: 0 /100 WBC (ref 0–0.2)
PH UR STRIP.AUTO: 6 [PH] (ref 5–9)
PLATELET # BLD AUTO: 202 10*3/MM3 (ref 140–450)
PMV BLD AUTO: 11.3 FL (ref 6–12)
POTASSIUM SERPL-SCNC: 4 MMOL/L (ref 3.5–5.2)
PROT SERPL-MCNC: 6.4 G/DL (ref 6–8.5)
PROT UR QL STRIP: NEGATIVE
QT INTERVAL: 424 MS
QTC INTERVAL: 416 MS
RBC # BLD AUTO: 3.67 10*6/MM3 (ref 3.77–5.28)
SODIUM SERPL-SCNC: 146 MMOL/L (ref 136–145)
SP GR UR STRIP: 1.01 (ref 1–1.03)
UROBILINOGEN UR QL STRIP: NORMAL
WBC # BLD AUTO: 7.93 10*3/MM3 (ref 3.4–10.8)
WHOLE BLOOD HOLD SPECIMEN: NORMAL

## 2021-04-16 PROCEDURE — 81003 URINALYSIS AUTO W/O SCOPE: CPT | Performed by: NURSE PRACTITIONER

## 2021-04-16 PROCEDURE — 80053 COMPREHEN METABOLIC PANEL: CPT | Performed by: NURSE PRACTITIONER

## 2021-04-16 PROCEDURE — 25010000002 IOPAMIDOL 61 % SOLUTION: Performed by: FAMILY MEDICINE

## 2021-04-16 PROCEDURE — 74177 CT ABD & PELVIS W/CONTRAST: CPT

## 2021-04-16 PROCEDURE — 25010000002 HYDROMORPHONE 1 MG/ML SOLUTION: Performed by: NURSE PRACTITIONER

## 2021-04-16 PROCEDURE — 96374 THER/PROPH/DIAG INJ IV PUSH: CPT

## 2021-04-16 PROCEDURE — 25010000002 ONDANSETRON PER 1 MG: Performed by: NURSE PRACTITIONER

## 2021-04-16 PROCEDURE — 96375 TX/PRO/DX INJ NEW DRUG ADDON: CPT

## 2021-04-16 PROCEDURE — 85025 COMPLETE CBC W/AUTO DIFF WBC: CPT | Performed by: NURSE PRACTITIONER

## 2021-04-16 PROCEDURE — 83690 ASSAY OF LIPASE: CPT | Performed by: NURSE PRACTITIONER

## 2021-04-16 PROCEDURE — 99284 EMERGENCY DEPT VISIT MOD MDM: CPT

## 2021-04-16 PROCEDURE — 99204 OFFICE O/P NEW MOD 45 MIN: CPT | Performed by: INTERNAL MEDICINE

## 2021-04-16 PROCEDURE — 93010 ELECTROCARDIOGRAM REPORT: CPT | Performed by: INTERNAL MEDICINE

## 2021-04-16 PROCEDURE — 93005 ELECTROCARDIOGRAM TRACING: CPT | Performed by: NURSE PRACTITIONER

## 2021-04-16 PROCEDURE — 25010000002 MORPHINE PER 10 MG: Performed by: NURSE PRACTITIONER

## 2021-04-16 RX ORDER — MELOXICAM 7.5 MG/1
7.5 TABLET ORAL DAILY
COMMUNITY
End: 2021-09-15

## 2021-04-16 RX ORDER — SODIUM CHLORIDE 0.9 % (FLUSH) 0.9 %
10 SYRINGE (ML) INJECTION AS NEEDED
Status: DISCONTINUED | OUTPATIENT
Start: 2021-04-16 | End: 2021-04-16 | Stop reason: HOSPADM

## 2021-04-16 RX ORDER — IBUPROFEN 800 MG/1
800 TABLET ORAL EVERY 6 HOURS PRN
Qty: 60 TABLET | Refills: 2 | Status: SHIPPED | OUTPATIENT
Start: 2021-04-16 | End: 2021-09-15

## 2021-04-16 RX ORDER — ONDANSETRON 2 MG/ML
4 INJECTION INTRAMUSCULAR; INTRAVENOUS ONCE
Status: COMPLETED | OUTPATIENT
Start: 2021-04-16 | End: 2021-04-16

## 2021-04-16 RX ORDER — IBUPROFEN 800 MG
1 TABLET ORAL DAILY
COMMUNITY

## 2021-04-16 RX ORDER — IPRATROPIUM BROMIDE 21 UG/1
2 SPRAY, METERED NASAL EVERY 12 HOURS
COMMUNITY

## 2021-04-16 RX ORDER — HYOSCYAMINE SULFATE EXTENDED-RELEASE 0.38 MG/1
0.38 TABLET ORAL EVERY 12 HOURS PRN
Qty: 60 TABLET | Refills: 3 | Status: SHIPPED | OUTPATIENT
Start: 2021-04-16 | End: 2021-09-15

## 2021-04-16 RX ADMIN — MORPHINE SULFATE 4 MG: 4 INJECTION, SOLUTION INTRAMUSCULAR; INTRAVENOUS at 11:25

## 2021-04-16 RX ADMIN — ONDANSETRON 4 MG: 2 INJECTION INTRAMUSCULAR; INTRAVENOUS at 11:24

## 2021-04-16 RX ADMIN — SODIUM CHLORIDE, POTASSIUM CHLORIDE, SODIUM LACTATE AND CALCIUM CHLORIDE 1000 ML: 600; 310; 30; 20 INJECTION, SOLUTION INTRAVENOUS at 11:23

## 2021-04-16 RX ADMIN — HYDROMORPHONE HYDROCHLORIDE 0.5 MG: 1 INJECTION, SOLUTION INTRAMUSCULAR; INTRAVENOUS; SUBCUTANEOUS at 12:59

## 2021-04-16 RX ADMIN — IOPAMIDOL 80 ML: 612 INJECTION, SOLUTION INTRAVENOUS at 12:45

## 2021-04-16 NOTE — PROGRESS NOTES
[facility]      Chief Complaint:   Chief Complaint   Patient presents with   • [facility] ED 2021     Abdominal Pain       Subjective     HPI:   Patient with severe abdominal pain [facility]  Patient was seen in the ER CAT scan and lab work were done which appeared normal.  Patient states she has severe diarrhea.  Patient has taken Lortab without improvement in symptoms.  Patient is able to take NSAIDs.    Plan; we will schedule patient for follow-up in 2 weeks.  We will start patient on Levbid for diarrhea.  Will have patient start Motrin 800 mg every 8 hours as needed.  Discussed with patient that if pain is still severe next week we can follow her up in 1 week.  Past Medical History:   Past Medical History:   Diagnosis Date   • Arthritis    • Brow ptosis 2018   • COPD (chronic obstructive pulmonary disease) (CMS/HCC)    • Headache    • Hepatitis B carrier (CMS/HCC)    • Hiatal hernia    • History of transfusion    • Hypothyroidism    • Irritable bowel disease    • Migraines    • Nerve damage     legs bilateral   • Sleep apnea     UNABLE TO WEAR C PAP   • Visual field defect    • Vitiligo        Past Surgical History:    Past Surgical History:   Procedure Laterality Date   • ANTERIOR CERVICAL DISCECTOMY W/ FUSION N/A 2020    Procedure: EXPLORATION OF FUSION, C 5-6, ANTERIOR CERVICAL DISCECTOMY FUSION C4-5, C 6-7;  Surgeon: RAFFAELE Burks MD;  Location: [facility] OR;  Service: Orthopedic Spine;  Laterality: N/A;   • BACK SURGERY     • BROW LIFT Bilateral 3/13/2018    Procedure: Direct brow lift;  Surgeon: Jesús Garza MD;  Location:  [facility] OR;  Service: ENT   •  SECTION     • COLONOSCOPY  2021    Dr. Miranda, [place], [place]   • HARDWARE REMOVAL N/A 2020    Procedure: REMOVAL OF INSTRUMENTATION;  Surgeon: RAFFAELE Burks MD;  Location:  [facility] OR;  Service: Orthopedic Spine;  Laterality: N/A;   • HYSTERECTOMY     • NECK  SURGERY     • SHOULDER SURGERY     • SINUS SURGERY     • SINUS SURGERY     • SPINE SURGERY     • UPPER GASTROINTESTINAL ENDOSCOPY  04/06/2021    Dr. Miranda, [place], [place]   • WRIST SURGERY Right        Family History:  Family History   Problem Relation Age of Onset   • Heart disease Mother         X 2 Stent Placement   • Thyroid disease Mother    • Diverticulitis Mother    • Asthma Mother    • Hypothyroidism Mother    • Heart disease Father         X 5 Stent Placement   • Colon cancer Father    • Prostate cancer Father    • Diverticulitis Father    • Anemia Father    • Heart disease Other    • Polymyalgia rheumatica Other    • Fibromyalgia Other    • Emphysema Other    • Emphysema Other    • Other Other         Colon Problems       Social History:   reports that she quit smoking about 2 years ago. Her smoking use included cigarettes. She has a 24.00 pack-year smoking history. She has never used smokeless tobacco. She reports that she does not drink alcohol and does not use drugs.    Medications:   Prior to Admission medications    Medication Sig Start Date End Date Taking? Authorizing Provider   aspirin 81 MG chewable tablet Chew 81 mg Daily.   Yes Aretha Richmond MD   Cholecalciferol (Vitamin D3) 10 MCG (400 UNIT) capsule Take 1 capsule by mouth Daily.   Yes Aretha Richmond MD   estradiol (ESTRACE) 1 MG tablet TAKE ONE TABLET BY MOUTH DAILY 10/30/19  Yes Francesco Zaidi MD   Ferrous Sulfate Dried (High Potency Iron) 65 MG tablet Take  by mouth.   Yes Aretha Richmond MD   fluorometholone (FML) 0.1 % ophthalmic suspension Administer 0.1 drops to both eyes 2 (Two) Times a Day. 2/14/19  Yes Aretha Richmond MD   gabapentin (NEURONTIN) 800 MG tablet Take 1 tablet by mouth 3 (Three) Times a Day.  Patient taking differently: Take 800 mg by mouth 2 (Two) Times a Day. TAKES 1/2 DOSE IN MORNING AND ONE IN EVENING 4/19/19  Yes Francesco Zaidi MD   HM CETIRIZINE HCL 10 MG tablet Take 10 mg by mouth  Daily. 3/21/19  Yes Aretha Richmond MD   HYDROcodone-acetaminophen (NORCO)  MG per tablet Take 1 tablet by mouth Every 4 (Four) Hours As Needed for Severe Pain . 6/23/20  Yes RAFFAELE Burks MD   ipratropium (ATROVENT) 0.03 % nasal spray 2 sprays into the nostril(s) as directed by provider Every 12 (Twelve) Hours.   Yes Aretha Richmond MD   levothyroxine (SYNTHROID, LEVOTHROID) 100 MCG tablet Take 1 tablet by mouth Daily. 4/22/19  Yes Francesco Zaidi MD   meloxicam (MOBIC) 7.5 MG tablet Take 7.5 mg by mouth Daily.   Yes Aretha Richmond MD   montelukast (SINGULAIR) 10 MG tablet Take 10 mg by mouth Daily. 2/13/19  Yes Aretha Richmond MD   Potassium Gluconate  MG tablet controlled-release Take 595 mg by mouth Daily.   Yes Aretha Richmond MD   zolpidem (AMBIEN) 5 MG tablet Take 1 tablet by mouth At Night As Needed for Sleep.  Patient taking differently: Take 10 mg by mouth Every Night. 4/19/19  Yes Francesco Zaidi MD   atorvastatin (LIPITOR) 20 MG tablet TAKE ONE TABLET BY MOUTH DAILY 8/16/19   Francesco Zaidi MD   Cyanocobalamin 1000 MCG/ML kit Inject  as directed Every 30 (Thirty) Days.    Aretha Richmond MD   furosemide (LASIX) 20 MG tablet Take 20 mg by mouth Daily. TAKES 1 AND 1/2 TABLETS DAILY 9/5/17   Aretha Richmond MD   hyoscyamine (Levbid) 0.375 MG 12 hr tablet Take 1 tablet by mouth Every 12 (Twelve) Hours As Needed for Cramping. 4/16/21   Jesús Pisano MD   ibuprofen (ADVIL,MOTRIN) 800 MG tablet Take 1 tablet by mouth Every 6 (Six) Hours As Needed for Mild Pain . 4/16/21   Jesús Pisano MD   lidocaine (LIDODERM) 5 % Place 1 patch on the skin as directed by provider Daily. Remove & Discard patch within 12 hours or as directed by MD  Patient taking differently: Place 1 patch on the skin as directed by provider Daily. Remove & Discard patch within 12 hours or as directed by MD . STATES USING LIDODERM CREAM NOW OR MIGHT USE PATCH ON LOWER BACK  "4/10/19   Francesco Zaidi MD   PREMARIN 0.625 MG tablet Take 1 tablet by mouth Daily. 4/19/19   Fracnesco Zaidi MD       Allergies:  Patient has no known allergies.    ROS:    Review of Systems   Constitutional: Negative for activity change, appetite change, chills, diaphoresis, fatigue, fever and unexpected weight change.   HENT: Negative for sore throat and trouble swallowing.    Respiratory: Negative for shortness of breath.    Gastrointestinal: Negative for abdominal distention, abdominal pain, anal bleeding, blood in stool, constipation, diarrhea, nausea, rectal pain and vomiting.   Endocrine: Negative for polydipsia, polyphagia and polyuria.   Genitourinary: Negative for difficulty urinating.   Musculoskeletal: Negative for arthralgias.   Skin: Negative for pallor.   Allergic/Immunologic: Negative for food allergies.   Neurological: Negative for weakness and light-headedness.   Psychiatric/Behavioral: Negative for behavioral problems.     Objective     Blood pressure 142/77, pulse 58, height 160 cm (63\"), weight 59.7 kg (131 lb 9.6 oz), not currently breastfeeding.    Physical Exam  Constitutional:       General: She is not in acute distress.     Appearance: She is well-developed. She is not diaphoretic.   HENT:      Head: Normocephalic and atraumatic.   Cardiovascular:      Rate and Rhythm: Normal rate and regular rhythm.      Heart sounds: Normal heart sounds. No murmur heard.   No friction rub. No gallop.    Pulmonary:      Effort: No respiratory distress.      Breath sounds: Normal breath sounds. No wheezing or rales.   Chest:      Chest wall: No tenderness.   Abdominal:      General: Bowel sounds are normal. There is no distension.      Palpations: Abdomen is soft. There is no mass.      Tenderness: There is no abdominal tenderness. There is no guarding or rebound.      Hernia: No hernia is present.   Musculoskeletal:         General: Normal range of motion.   Skin:     General: Skin is warm and dry.    "   Coloration: Skin is not pale.      Findings: No erythema or rash.   Neurological:      Mental Status: She is alert and oriented to person, place, and time.   Psychiatric:         Behavior: Behavior normal.         Thought Content: Thought content normal.         Judgment: Judgment normal.          Assessment/Plan   Diagnoses and all orders for this visit:    1. Epigastric pain (Primary)    2. Diarrhea, unspecified type    Other orders  -     hyoscyamine (Levbid) 0.375 MG 12 hr tablet; Take 1 tablet by mouth Every 12 (Twelve) Hours As Needed for Cramping.  Dispense: 60 tablet; Refill: 3  -     ibuprofen (ADVIL,MOTRIN) 800 MG tablet; Take 1 tablet by mouth Every 6 (Six) Hours As Needed for Mild Pain .  Dispense: 60 tablet; Refill: 2        * Surgery not found *     Diagnosis Plan   1. Epigastric pain     2. Diarrhea, unspecified type         Anticipated Surgical Procedure:  No orders of the defined types were placed in this encounter.      The risks, benefits, and alternatives of this procedure have been discussed with the patient or the responsible party- the patient understands and agrees to proceed.

## 2021-04-16 NOTE — DISCHARGE INSTRUCTIONS
I have spoken with Dr. Pisano's office.  He is willing to see you this afternoon.  Go straight to his office after leaving the emergency room.  Do not eat or drink anything until after your appointment with him.  Return to the ER as needed.  
English

## 2021-04-16 NOTE — ED PROVIDER NOTES
Subjective   Patient presents to the ER with complaints of abdominal pain for the past 2 weeks.  She states she had a colonoscopy on April 6 in [place] where they removed 3 polyps.  She states since her procedure she has been having this pain across her lower abdomen that has progressively been getting worse.  She has tried multiple times to get in touch with her GI specialist this week without any call backs.  She states she was seen last night in the ER at [facility] where they did blood work urine and took some x-rays of her belly.  She was advised at that time that she was dehydrated and she needed to have a CT scan completed.  Their machine was broken at that time and she was advised to go to a different ER to have that completed.  She states she did not go last night secondary to the pain.  She reports today she took Pepto-Bismol and her routine Norco.  She states that has not helped her pain.  She denies nausea vomiting chest pain or shortness of breath.  Her last bowel movement was this morning          Review of Systems   Constitutional: Positive for fatigue. Negative for chills and fever.   Respiratory: Negative for shortness of breath.    Gastrointestinal: Positive for abdominal distention (feels bloated) and abdominal pain. Negative for diarrhea, nausea and vomiting.   Genitourinary: Negative.    Musculoskeletal: Negative.    Skin: Negative.    Neurological: Negative.    Psychiatric/Behavioral: Negative.        Past Medical History:   Diagnosis Date   • Arthritis    • Brow ptosis 2/21/2018   • COPD (chronic obstructive pulmonary disease) (CMS/HCC)    • Headache    • Hepatitis B carrier (CMS/HCC)    • Hiatal hernia    • History of transfusion    • Hypothyroidism    • Irritable bowel disease    • Migraines    • Nerve damage     legs bilateral   • Sleep apnea     UNABLE TO WEAR C PAP   • Visual field defect    • Vitiligo        No Known Allergies    Past Surgical History:   Procedure Laterality Date   •  ANTERIOR CERVICAL DISCECTOMY W/ FUSION N/A 2020    Procedure: EXPLORATION OF FUSION, C 5-6, ANTERIOR CERVICAL DISCECTOMY FUSION C4-5, C 6-7;  Surgeon: RAFFAELE Burks MD;  Location:  [facility] OR;  Service: Orthopedic Spine;  Laterality: N/A;   • BACK SURGERY     • BROW LIFT Bilateral 3/13/2018    Procedure: Direct brow lift;  Surgeon: Jesús Garza MD;  Location:  [facility] OR;  Service: ENT   •  SECTION     • COLONOSCOPY  2021    Dr. Miranda, [place], [place]   • HARDWARE REMOVAL N/A 2020    Procedure: REMOVAL OF INSTRUMENTATION;  Surgeon: RAFFAELE Burks MD;  Location:  [facility] OR;  Service: Orthopedic Spine;  Laterality: N/A;   • HYSTERECTOMY     • NECK SURGERY     • SHOULDER SURGERY     • SINUS SURGERY     • SINUS SURGERY     • SPINE SURGERY     • UPPER GASTROINTESTINAL ENDOSCOPY  2021    Dr. Miranda, [place], [place]   • WRIST SURGERY Right        Family History   Problem Relation Age of Onset   • Heart disease Mother         X 2 Stent Placement   • Thyroid disease Mother    • Diverticulitis Mother    • Asthma Mother    • Hypothyroidism Mother    • Heart disease Father         X 5 Stent Placement   • Colon cancer Father    • Prostate cancer Father    • Diverticulitis Father    • Anemia Father    • Heart disease Other    • Polymyalgia rheumatica Other    • Fibromyalgia Other    • Emphysema Other    • Emphysema Other    • Other Other         Colon Problems       Social History     Socioeconomic History   • Marital status:      Spouse name: Not on file   • Number of children: Not on file   • Years of education: Not on file   • Highest education level: Not on file   Tobacco Use   • Smoking status: Former Smoker     Packs/day: 0.50     Years: 48.00     Pack years: 24.00     Types: Cigarettes     Quit date: 2019     Years since quittin.2   • Smokeless tobacco: Never Used   Vaping Use   • Vaping Use: Every day   • Substances: Nicotine (5%), Flavoring   • Devices: Disposable  "  Substance and Sexual Activity   • Alcohol use: No   • Drug use: Never   • Sexual activity: Defer           Objective    /57 (Patient Position: Sitting)   Pulse 68   Temp 97.8 °F (36.6 °C) (Infrared)   Resp 18   Ht 160 cm (63\")   Wt 61.2 kg (135 lb)   SpO2 100%   BMI 23.91 kg/m²     Physical Exam  Vitals and nursing note reviewed.   Constitutional:       General: She is not in acute distress.     Appearance: She is well-developed. She is not ill-appearing.   HENT:      Head: Normocephalic and atraumatic.   Cardiovascular:      Rate and Rhythm: Normal rate and regular rhythm.      Heart sounds: Normal heart sounds. No murmur heard.     Pulmonary:      Effort: Pulmonary effort is normal. No respiratory distress.      Breath sounds: Normal breath sounds. No wheezing.   Abdominal:      General: Abdomen is flat. Bowel sounds are normal. There is no distension.      Palpations: Abdomen is soft.      Tenderness: There is generalized abdominal tenderness. There is guarding.   Musculoskeletal:         General: Normal range of motion.      Cervical back: Normal range of motion and neck supple.   Skin:     General: Skin is warm and dry.      Capillary Refill: Capillary refill takes less than 2 seconds.   Neurological:      Mental Status: She is alert and oriented to person, place, and time.      Coordination: Coordination normal.   Psychiatric:         Behavior: Behavior normal.         Thought Content: Thought content normal.         Judgment: Judgment normal.         Procedures  Results for orders placed or performed during the hospital encounter of 04/16/21   Comprehensive Metabolic Panel    Specimen: Blood   Result Value Ref Range    Glucose 116 (H) 65 - 99 mg/dL    BUN 13 8 - 23 mg/dL    Creatinine 0.75 0.57 - 1.00 mg/dL    Sodium 146 (H) 136 - 145 mmol/L    Potassium 4.0 3.5 - 5.2 mmol/L    Chloride 110 (H) 98 - 107 mmol/L    CO2 27.0 22.0 - 29.0 mmol/L    Calcium 9.3 8.6 - 10.5 mg/dL    Total Protein 6.4 " 6.0 - 8.5 g/dL    Albumin 4.00 3.50 - 5.20 g/dL    ALT (SGPT) 20 1 - 33 U/L    AST (SGOT) 22 1 - 32 U/L    Alkaline Phosphatase 108 39 - 117 U/L    Total Bilirubin 0.7 0.0 - 1.2 mg/dL    eGFR Non African Amer 77 >60 mL/min/1.73    Globulin 2.4 gm/dL    A/G Ratio 1.7 g/dL    BUN/Creatinine Ratio 17.3 7.0 - 25.0    Anion Gap 9.0 5.0 - 15.0 mmol/L   Lipase    Specimen: Blood   Result Value Ref Range    Lipase 35 13 - 60 U/L   Urinalysis With Microscopic If Indicated (No Culture) - Urine, Clean Catch    Specimen: Urine, Clean Catch   Result Value Ref Range    Color, UA Yellow Yellow, Straw, Dark Yellow, Luisa    Appearance, UA Clear Clear    pH, UA 6.0 5.0 - 9.0    Specific Gravity, UA 1.010 1.003 - 1.030    Glucose, UA Negative Negative    Ketones, UA Negative Negative    Bilirubin, UA Negative Negative    Blood, UA Negative Negative    Protein, UA Negative Negative    Leuk Esterase, UA Negative Negative    Nitrite, UA Negative Negative    Urobilinogen, UA 0.2 E.U./dL 0.2 - 1.0 E.U./dL   CBC Auto Differential    Specimen: Blood   Result Value Ref Range    WBC 7.93 3.40 - 10.80 10*3/mm3    RBC 3.67 (L) 3.77 - 5.28 10*6/mm3    Hemoglobin 11.1 (L) 12.0 - 15.9 g/dL    Hematocrit 34.2 34.0 - 46.6 %    MCV 93.2 79.0 - 97.0 fL    MCH 30.2 26.6 - 33.0 pg    MCHC 32.5 31.5 - 35.7 g/dL    RDW 12.4 12.3 - 15.4 %    RDW-SD 42.3 37.0 - 54.0 fl    MPV 11.3 6.0 - 12.0 fL    Platelets 202 140 - 450 10*3/mm3    Neutrophil % 70.6 42.7 - 76.0 %    Lymphocyte % 19.4 (L) 19.6 - 45.3 %    Monocyte % 7.4 5.0 - 12.0 %    Eosinophil % 1.5 0.3 - 6.2 %    Basophil % 0.8 0.0 - 1.5 %    Immature Grans % 0.3 0.0 - 0.5 %    Neutrophils, Absolute 5.60 1.70 - 7.00 10*3/mm3    Lymphocytes, Absolute 1.54 0.70 - 3.10 10*3/mm3    Monocytes, Absolute 0.59 0.10 - 0.90 10*3/mm3    Eosinophils, Absolute 0.12 0.00 - 0.40 10*3/mm3    Basophils, Absolute 0.06 0.00 - 0.20 10*3/mm3    Immature Grans, Absolute 0.02 0.00 - 0.05 10*3/mm3    nRBC 0.0 0.0 - 0.2 /100  WBC   ECG 12 Lead   Result Value Ref Range    QT Interval 424 ms    QTC Interval 416 ms   Light Blue Top   Result Value Ref Range    Extra Tube hold for add-on    Gold Top - SST   Result Value Ref Range    Extra Tube Hold for add-ons.      CT Abdomen Pelvis With Contrast    Result Date: 4/16/2021  Narrative: Patient Name: AREN MINER ORDERING: MARK CAMPBELL ATTENDING: JUANCHO DOMÍNGUEZ REFERRING: MARK CAMPBELL ----------------------- EXAM DESCRIPTION: CT ABDOMEN PELVIS W CONTRAST HISTORY: abdominal pain  - started 4/6/21 after colonoscopy COMPARISON: None Dose Length Product: 521. This exam was performed according to our departmental dose optimization program, which includes automated exposure control, adjustment of the mA and/or KV according to patient size and/or use of iterative reconstruction technique. CONTRAST: 80 mL intravenous Isovue 300.  TECHNIQUE: Multiple contiguous contrast enhanced axial images are obtained of the abdomen and pelvis with coronal and sagittal reformat images provided. FINDINGS: LOWER CHEST: No pulmonary consolidation or pleural effusion. No cardiac enlargement or pericardial effusion. HEPATOBILIARY: Tiny subcentimeter cysts involving both lobes of the liver. No suspicious hepatic lesion or ductal dilation. The gallbladder is mildly contracted. No calcified stones or pericholecystic inflammatory change. No extrahepatic biliary dilation. SPLEEN: Unremarkable. PANCREAS: Unremarkable ADRENAL GLANDS: Unremarkable. KIDNEYS/URETERS: No suspicious renal mass, renal/ureteral calcification, or obstructive uropathy. GASTROINTESTINAL: No small bowel obstruction. The terminal ileum and ileocecal junction are unremarkable. No acute inflammatory changes within the right lower quadrant. No acute inflammatory changes of the large bowel. REPRODUCTIVE ORGANS: Hysterectomy. URINARY BLADDER: Unremarkable VASCULAR: Vascular calcification without abdominal aortic aneurysm. LYMPH NODES: No  pathologically enlarged nodes by size criteria. PERITONEUM/RETROPERITONEUM: There is trace amount of free fluid within the dependent pelvis, nonspecific. No free air. OSSEOUS STRUCTURES: Extensive postsurgical changes within the lower lumbar and lumbosacral spine with posterior fusion spanning L3-S1 with pedicle screws and vertical stabilization struts. Pedicle screws are absent at L5. There is spondylolisthesis at L5-S1. Degenerative changes within the spine. ADDITIONAL FINDINGS: Tiny fat-containing umbilical hernia. Amorphic focus of attenuation within the subcutaneous soft tissues of the left gluteal region may represent sequela of injection or trauma.     Impression: No CT evidence of acute abdominal or pelvic finding. Trace amount of nonspecific free fluid within the pelvis. No free air or abscess. Tiny subcentimeter hepatic cysts. Hysterectomy. Postsurgical changes of the spine. Additional benign and/or chronic findings as detailed above. Electronically signed by:  Shola Coe MD  4/16/2021 1:30 PM CDT Workstation: [id number]             ED Course  ED Course as of Apr 22 1153   Fri Apr 16, 2021   1430 In to speak with patient regarding work up. Patient is upset and angry that I do not have a definative answers regarding her abdominal pain. She states she does not want to follow up with her GI in [place] and would like referral to someone else. Dr. Pisano is on call. His office was called and he can see her today.     [SH]      ED Course User Index  [SH] Shilpa Cruz, APRN                                           [facility]    Final diagnoses:   Abdominal pain, generalized       ED Disposition  ED Disposition     ED Disposition Condition Comment    Discharge Stable           Jesús Pisano MD  [address] [place] [address] DR DE LA ROSA [address] [place] [id number] [phone]      Immediately when discharged from ER         Medication List      Changed    gabapentin 800 MG tablet  Commonly known as: Neurontin  Take 1  tablet by mouth 3 (Three) Times a Day.  What changed:   · when to take this  · additional instructions     lidocaine 5 %  Commonly known as: Lidoderm  Place 1 patch on the skin as directed by provider Daily. Remove & Discard patch within 12 hours or as directed by MD  What changed: additional instructions     zolpidem 5 MG tablet  Commonly known as: Ambien  Take 1 tablet by mouth At Night As Needed for Sleep.  What changed:   · how much to take  · when to take this             Shilpa Cruz, APRN  04/22/21 1157

## 2021-04-16 NOTE — PATIENT INSTRUCTIONS
Abdominal Pain, Adult  Many things can cause belly (abdominal) pain. Most times, belly pain is not dangerous. Many cases of belly pain can be watched and treated at home. Sometimes, though, belly pain is serious. Your doctor will try to find the cause of your belly pain.  Follow these instructions at home:    Medicines  · Take over-the-counter and prescription medicines only as told by your doctor.  · Do not take medicines that help you poop (laxatives) unless told by your doctor.  General instructions  · Watch your belly pain for any changes.  · Drink enough fluid to keep your pee (urine) pale yellow.  · Keep all follow-up visits as told by your doctor. This is important.  Contact a doctor if:  · Your belly pain changes or gets worse.  · You are not hungry, or you lose weight without trying.  · You are having trouble pooping (constipated) or have watery poop (diarrhea) for more than 2-3 days.  · You have pain when you pee or poop.  · Your belly pain wakes you up at night.  · Your pain gets worse with meals, after eating, or with certain foods.  · You are vomiting and cannot keep anything down.  · You have a fever.  · You have blood in your pee.  Get help right away if:  · Your pain does not go away as soon as your doctor says it should.  · You cannot stop vomiting.  · Your pain is only in areas of your belly, such as the right side or the left lower part of the belly.  · You have bloody or black poop, or poop that looks like tar.  · You have very bad pain, cramping, or bloating in your belly.  · You have signs of not having enough fluid or water in your body (dehydration), such as:  ? Dark pee, very little pee, or no pee.  ? Cracked lips.  ? Dry mouth.  ? Sunken eyes.  ? Sleepiness.  ? Weakness.  · You have trouble breathing or chest pain.  Summary  · Many cases of belly pain can be watched and treated at home.  · Watch your belly pain for any changes.  · Take over-the-counter and prescription medicines only as  told by your doctor.  · Contact a doctor if your belly pain changes or gets worse.  · Get help right away if you have very bad pain, cramping, or bloating in your belly.  This information is not intended to replace advice given to you by your health care provider. Make sure you discuss any questions you have with your health care provider.  Document Revised: 04/27/2020 Document Reviewed: 04/27/2020  Elsevier Patient Education © 2021 [employer]

## 2021-04-20 ENCOUNTER — TELEPHONE (OUTPATIENT)
Dept: GASTROENTEROLOGY | Facility: CLINIC | Age: 66
End: 2021-04-20

## 2021-04-20 NOTE — TELEPHONE ENCOUNTER
----- Message from Laurie Og MA sent at 4/20/2021  9:08 AM CDT -----  Patient called and wanted you to give her a call back. [phone]

## 2021-04-20 NOTE — TELEPHONE ENCOUNTER
04/20/2021, 0940 - Patient telephone maycol returned per this staff member [phone].  Patient confirmed she has tried Dicyclomine 10 MG, Imodium, and Pepto Bismol as treatment for diarrhea.  Patient made aware Prior Authorization for Hyoscyamine (Levbid) 0.375 MG Tablets will be submitted today, Tuesday, April 20, 2021.  Patient verbalized understanding.

## 2021-04-23 ENCOUNTER — TELEPHONE (OUTPATIENT)
Dept: GASTROENTEROLOGY | Facility: CLINIC | Age: 66
End: 2021-04-23

## 2021-04-23 NOTE — TELEPHONE ENCOUNTER
04/23/2021, 1021 - Patient telephoned per this staff member [phone] with notification of Prior Authorization denial received for prescription medication Hyoscyamine (Levbid) 0.375 MG Tablets as it is listed as an unapproved drug and therefore can not be paid by Medicare Part D.  Patient verbalized understanding and stated she purchased medication with an approximate cost of $14.00.

## 2021-04-30 ENCOUNTER — OFFICE VISIT (OUTPATIENT)
Dept: GASTROENTEROLOGY | Facility: CLINIC | Age: 66
End: 2021-04-30

## 2021-04-30 VITALS
WEIGHT: 132.4 LBS | BODY MASS INDEX: 23.46 KG/M2 | SYSTOLIC BLOOD PRESSURE: 120 MMHG | DIASTOLIC BLOOD PRESSURE: 74 MMHG | HEIGHT: 63 IN | HEART RATE: 72 BPM

## 2021-04-30 DIAGNOSIS — K59.03 DRUG-INDUCED CONSTIPATION: ICD-10-CM

## 2021-04-30 DIAGNOSIS — R19.7 DIARRHEA, UNSPECIFIED TYPE: ICD-10-CM

## 2021-04-30 DIAGNOSIS — R68.81 EARLY SATIETY: Primary | ICD-10-CM

## 2021-04-30 PROCEDURE — 99214 OFFICE O/P EST MOD 30 MIN: CPT | Performed by: INTERNAL MEDICINE

## 2021-04-30 RX ORDER — EZETIMIBE 10 MG/1
10 TABLET ORAL DAILY
COMMUNITY
Start: 2021-04-27

## 2021-04-30 NOTE — PATIENT INSTRUCTIONS
"BMI for Adults  What is BMI?  Body mass index (BMI) is a number that is calculated from a person's weight and height. BMI can help estimate how much of a person's weight is composed of fat. BMI does not measure body fat directly. Rather, it is an alternative to procedures that directly measure body fat, which can be difficult and expensive.  BMI can help identify people who may be at higher risk for certain medical problems.  What are BMI measurements used for?  BMI is used as a screening tool to identify possible weight problems. It helps determine whether a person is obese, overweight, a healthy weight, or underweight.  BMI is useful for:  · Identifying a weight problem that may be related to a medical condition or may increase the risk for medical problems.  · Promoting changes, such as changes in diet and exercise, to help reach a healthy weight. BMI screening can be repeated to see if these changes are working.  How is BMI calculated?  BMI involves measuring your weight in relation to your height. Both height and weight are measured, and the BMI is calculated from those numbers. This can be done either in English (U[place] or metric measurements. Note that charts and online BMI calculators are available to help you find your BMI quickly and easily without having to do these calculations yourself.  To calculate your BMI in English (U.S[place] measurements:    1. Measure your weight in pounds (lb).  2. Multiply the number of pounds by 703.  ? For example, for a person who weighs 180 lb, multiply that number by 703, which equals 126,540.  3. Measure your height in inches. Then multiply that number by itself to get a measurement called \"inches squared.\"  ? For example, for a person who is 70 inches tall, the \"inches squared\" measurement is 70 inches x 70 inches, which equals 4,900 inches squared.  4. Divide the total from step 2 (number of lb x 703) by the total from step 3 (inches squared): 126,540 ÷ 4,900 = 25.8. This is " "your BMI.  To calculate your BMI in metric measurements:  1. Measure your weight in kilograms (kg).  2. Measure your height in meters (m). Then multiply that number by itself to get a measurement called \"meters squared.\"  ? For example, for a person who is 1.75 m tall, the \"meters squared\" measurement is 1.75 m x 1.75 m, which is equal to 3.1 meters squared.  3. Divide the number of kilograms (your weight) by the meters squared number. In this example: 70 ÷ 3.1 = 22.6. This is your BMI.  What do the results mean?  BMI charts are used to identify whether you are underweight, normal weight, overweight, or obese. The following guidelines will be used:  · Underweight: BMI less than 18.5.  · Normal weight: BMI between 18.5 and 24.9.  · Overweight: BMI between 25 and 29.9.  · Obese: BMI of 30 or above.  Keep these notes in mind:  · Weight includes both fat and muscle, so someone with a muscular build, such as an athlete, may have a BMI that is higher than 24.9. In cases like these, BMI is not an accurate measure of body fat.  · To determine if excess body fat is the cause of a BMI of 25 or higher, further assessments may need to be done by a health care provider.  · BMI is usually interpreted in the same way for men and women.  Where to find more information  For more information about BMI, including tools to quickly calculate your BMI, go to these websites:  · Centers for Disease Control and Prevention: www.cdc.gov  · American Heart Association: www.heart.org  · National Heart, Lung, and Blood [place]: www.nhlbi.nih.gov  Summary  · Body mass index (BMI) is a number that is calculated from a person's weight and height.  · BMI may help estimate how much of a person's weight is composed of fat. BMI can help identify those who may be at higher risk for certain medical problems.  · BMI can be measured using English measurements or metric measurements.  · BMI charts are used to identify whether you are underweight, normal " weight, overweight, or obese.  This information is not intended to replace advice given to you by your health care provider. Make sure you discuss any questions you have with your health care provider.  Document Revised: 09/09/2020 Document Reviewed: 07/17/2020  Elsevier Patient Education © 2021 [employer]

## 2021-04-30 NOTE — PROGRESS NOTES
[facility]      Chief Complaint:   Chief Complaint   Patient presents with   • 2 week f/u   • Abdominal Pain   • Diarrhea       Subjective     HPI:   Patient with epigastric abdominal pain.  Patient also with change in bowel habits with episodes of constipation and diarrhea.  Patient is starting to learn some foods that cause her to have abdominal pain.  Patient used to smoke up to 4 years ago when she stopped and began vaping.  Patient also states early satiety.    Plan; we will schedule patient for gastric emptying study and ultrasound the abdomen with Doppler studies of the mesenteric vessels.  Patient follow-up in 4 weeks.    Past Medical History:   Past Medical History:   Diagnosis Date   • Arthritis    • Brow ptosis 2018   • COPD (chronic obstructive pulmonary disease) (CMS/HCC)    • Headache    • Hepatitis B carrier (CMS/HCC)    • Hiatal hernia    • History of transfusion    • Hypothyroidism    • Irritable bowel disease    • Migraines    • Nerve damage     legs bilateral   • Sleep apnea     UNABLE TO WEAR C PAP   • Visual field defect    • Vitiligo        Past Surgical History:  Past Surgical History:   Procedure Laterality Date   • ANTERIOR CERVICAL DISCECTOMY W/ FUSION N/A 2020    Procedure: EXPLORATION OF FUSION, C 5-6, ANTERIOR CERVICAL DISCECTOMY FUSION C4-5, C 6-7;  Surgeon: RAFFAELE Burks MD;  Location: [facility] OR;  Service: Orthopedic Spine;  Laterality: N/A;   • BACK SURGERY     • BROW LIFT Bilateral 3/13/2018    Procedure: Direct brow lift;  Surgeon: Jesús Garza MD;  Location: [facility] OR;  Service: ENT   •  SECTION     • COLONOSCOPY  2021    Dr. Miranda, [place], [place]   • HARDWARE REMOVAL N/A 2020    Procedure: REMOVAL OF INSTRUMENTATION;  Surgeon: RAFFAELE Burks MD;  Location: [facility] OR;  Service: Orthopedic Spine;  Laterality: N/A;   • HYSTERECTOMY     • NECK SURGERY     • SHOULDER SURGERY     • SINUS SURGERY     • SINUS SURGERY      • SPINE SURGERY     • UPPER GASTROINTESTINAL ENDOSCOPY  04/06/2021    Dr. Miranda, [place], [place]   • WRIST SURGERY Right        Family History:  Family History   Problem Relation Age of Onset   • Heart disease Mother         X 2 Stent Placement   • Thyroid disease Mother    • Diverticulitis Mother    • Asthma Mother    • Hypothyroidism Mother    • Heart disease Father         X 5 Stent Placement   • Colon cancer Father    • Prostate cancer Father    • Diverticulitis Father    • Anemia Father    • Heart disease Other    • Polymyalgia rheumatica Other    • Fibromyalgia Other    • Emphysema Other    • Emphysema Other    • Other Other         Colon Problems       Social History:   reports that she quit smoking about 2 years ago. Her smoking use included cigarettes. She has a 24.00 pack-year smoking history. She has never used smokeless tobacco. She reports that she does not drink alcohol and does not use drugs.    Medications:   Prior to Admission medications    Medication Sig Start Date End Date Taking? Authorizing Provider   aspirin 81 MG chewable tablet Chew 81 mg Daily.   Yes Aretha Richmond MD   atorvastatin (LIPITOR) 20 MG tablet TAKE ONE TABLET BY MOUTH DAILY 8/16/19  Yes Francesco Zaidi MD   Cholecalciferol (Vitamin D3) 10 MCG (400 UNIT) capsule Take 1 capsule by mouth Daily.   Yes Aretha Richmond MD   Cyanocobalamin 1000 MCG/ML kit Inject  as directed Every 30 (Thirty) Days.   Yes Aretha Richmond MD   estradiol (ESTRACE) 1 MG tablet TAKE ONE TABLET BY MOUTH DAILY 10/30/19  Yes Francesco Zaidi MD   ezetimibe (ZETIA) 10 MG tablet Take 10 mg by mouth Daily. 4/27/21  Yes Aretha Richmond MD   Ferrous Sulfate Dried (High Potency Iron) 65 MG tablet Take  by mouth.   Yes Aretha Richmond MD   fluorometholone (FML) 0.1 % ophthalmic suspension Administer 0.1 drops to both eyes 2 (Two) Times a Day. 2/14/19  Yes Aretha Richmond MD   furosemide (LASIX) 20 MG tablet Take 20 mg by mouth  Daily. TAKES 1 AND 1/2 TABLETS DAILY 9/5/17  Yes Aretha Richmond MD   gabapentin (NEURONTIN) 800 MG tablet Take 1 tablet by mouth 3 (Three) Times a Day.  Patient taking differently: Take 800 mg by mouth 2 (Two) Times a Day. TAKES 1/2 DOSE IN MORNING AND ONE IN EVENING 4/19/19  Yes Francesco Zaidi MD   HM CETIRIZINE HCL 10 MG tablet Take 10 mg by mouth Daily. 3/21/19  Yes Aretha Richmond MD   HYDROcodone-acetaminophen (NORCO)  MG per tablet Take 1 tablet by mouth Every 4 (Four) Hours As Needed for Severe Pain . 6/23/20  Yes RAFFAELE Burks MD   hyoscyamine (Levbid) 0.375 MG 12 hr tablet Take 1 tablet by mouth Every 12 (Twelve) Hours As Needed for Cramping. 4/16/21  Yes Jesús Pisano MD   ibuprofen (ADVIL,MOTRIN) 800 MG tablet Take 1 tablet by mouth Every 6 (Six) Hours As Needed for Mild Pain . 4/16/21  Yes Jesús Pisano MD   ipratropium (ATROVENT) 0.03 % nasal spray 2 sprays into the nostril(s) as directed by provider Every 12 (Twelve) Hours.   Yes Aretha Richmond MD   levothyroxine (SYNTHROID, LEVOTHROID) 100 MCG tablet Take 1 tablet by mouth Daily. 4/22/19  Yes Francesco Zaidi MD   lidocaine (LIDODERM) 5 % Place 1 patch on the skin as directed by provider Daily. Remove & Discard patch within 12 hours or as directed by MD  Patient taking differently: Place 1 patch on the skin as directed by provider Daily. Remove & Discard patch within 12 hours or as directed by MD . STATES USING LIDODERM CREAM NOW OR MIGHT USE PATCH ON LOWER BACK 4/10/19  Yes Francesco Zaidi MD   meloxicam (MOBIC) 7.5 MG tablet Take 7.5 mg by mouth Daily.   Yes Aretha Richmond MD   montelukast (SINGULAIR) 10 MG tablet Take 10 mg by mouth Daily. 2/13/19  Yes Aretha Richmond MD   Potassium Gluconate  MG tablet controlled-release Take 595 mg by mouth Daily.   Yes Aretha Richmond MD   PREMARIN 0.625 MG tablet Take 1 tablet by mouth Daily. 4/19/19  Yes Francesco Zaidi MD   zolpidem (AMBIEN) 5  "MG tablet Take 1 tablet by mouth At Night As Needed for Sleep.  Patient taking differently: Take 10 mg by mouth Every Night. 4/19/19  Yes Francesco Zaidi MD       Allergies:  Patient has no known allergies.    ROS:    Review of Systems   Constitutional: Negative for activity change, appetite change, chills, diaphoresis, fatigue, fever and unexpected weight change.   HENT: Negative for sore throat and trouble swallowing.    Respiratory: Negative for shortness of breath.    Gastrointestinal: Positive for abdominal pain. Negative for abdominal distention, anal bleeding, blood in stool, constipation, diarrhea, nausea, rectal pain and vomiting.   Endocrine: Negative for polydipsia, polyphagia and polyuria.   Genitourinary: Negative for difficulty urinating.   Musculoskeletal: Negative for arthralgias.   Skin: Negative for pallor.   Allergic/Immunologic: Negative for food allergies.   Neurological: Negative for weakness and light-headedness.   Psychiatric/Behavioral: Negative for behavioral problems.     Objective     Blood pressure 120/74, pulse 72, height 160 cm (63\"), weight 60.1 kg (132 lb 6.4 oz), not currently breastfeeding.    Physical Exam  Constitutional:       General: She is not in acute distress.     Appearance: She is well-developed. She is not diaphoretic.   HENT:      Head: Normocephalic and atraumatic.   Cardiovascular:      Rate and Rhythm: Normal rate and regular rhythm.      Heart sounds: Normal heart sounds. No murmur heard.   No friction rub. No gallop.    Pulmonary:      Effort: No respiratory distress.      Breath sounds: Normal breath sounds. No wheezing or rales.   Chest:      Chest wall: No tenderness.   Abdominal:      General: Bowel sounds are normal. There is no distension.      Palpations: Abdomen is soft. There is no mass.      Tenderness: There is no abdominal tenderness. There is no guarding or rebound.      Hernia: No hernia is present.   Musculoskeletal:         General: Normal range of " motion.   Skin:     General: Skin is warm and dry.      Coloration: Skin is not pale.      Findings: No erythema or rash.   Neurological:      Mental Status: She is alert and oriented to person, place, and time.   Psychiatric:         Behavior: Behavior normal.         Thought Content: Thought content normal.         Judgment: Judgment normal.          Assessment/Plan   Diagnoses and all orders for this visit:    1. Early satiety (Primary)  -     US Abdomen Complete; Future  -     NM Gastric Emptying; Future    2. Diarrhea, unspecified type    3. Drug-induced constipation        * Surgery not found *     Diagnosis Plan   1. Early satiety  US Abdomen Complete    NM Gastric Emptying   2. Diarrhea, unspecified type     3. Drug-induced constipation         Anticipated Surgical Procedure:  Orders Placed This Encounter   Procedures   • US Abdomen Complete     Dopplers of the mesenteric vessels     Standing Status:   Future     Standing Expiration Date:   4/30/2022     Order Specific Question:   Reason for Exam:     Answer:   abd pain   • NM Gastric Emptying     Standing Status:   Future     Standing Expiration Date:   4/30/2022       The risks, benefits, and alternatives of this procedure have been discussed with the patient or the responsible party- the patient understands and agrees to proceed.

## 2021-05-12 ENCOUNTER — HOSPITAL ENCOUNTER (OUTPATIENT)
Dept: NUCLEAR MEDICINE | Facility: HOSPITAL | Age: 66
Discharge: HOME | End: 2021-05-12

## 2021-05-12 DIAGNOSIS — R68.81 EARLY SATIETY: ICD-10-CM

## 2021-05-12 PROCEDURE — 0 TECHNETIUM SULFUR COLLOID: Performed by: INTERNAL MEDICINE

## 2021-05-12 PROCEDURE — A9541 TC99M SULFUR COLLOID: HCPCS | Performed by: INTERNAL MEDICINE

## 2021-05-12 PROCEDURE — 78264 GASTRIC EMPTYING IMG STUDY: CPT

## 2021-05-12 RX ADMIN — TECHNETIUM TC 99M SULFUR COLLOID 1 DOSE: KIT at 08:50

## 2021-05-21 ENCOUNTER — HOSPITAL ENCOUNTER (OUTPATIENT)
Dept: ULTRASOUND IMAGING | Facility: HOSPITAL | Age: 66
Discharge: HOME | End: 2021-05-21

## 2021-05-21 DIAGNOSIS — R10.9 ABDOMINAL PAIN: ICD-10-CM

## 2021-05-21 DIAGNOSIS — R68.81 EARLY SATIETY: ICD-10-CM

## 2021-05-21 PROCEDURE — 93976 VASCULAR STUDY: CPT

## 2021-05-21 PROCEDURE — 76700 US EXAM ABDOM COMPLETE: CPT

## 2021-05-28 ENCOUNTER — OFFICE VISIT (OUTPATIENT)
Dept: GASTROENTEROLOGY | Facility: CLINIC | Age: 66
End: 2021-05-28

## 2021-05-28 VITALS
BODY MASS INDEX: 23.25 KG/M2 | HEIGHT: 63 IN | DIASTOLIC BLOOD PRESSURE: 81 MMHG | WEIGHT: 131.2 LBS | SYSTOLIC BLOOD PRESSURE: 122 MMHG | HEART RATE: 66 BPM

## 2021-05-28 DIAGNOSIS — K55.1 INTESTINAL ANGINA (HCC): Primary | ICD-10-CM

## 2021-05-28 PROCEDURE — 99213 OFFICE O/P EST LOW 20 MIN: CPT | Performed by: INTERNAL MEDICINE

## 2021-05-28 NOTE — PROGRESS NOTES
[facility]      Chief Complaint:   Chief Complaint   Patient presents with   • 4 week f/u       Subjective     HPI:   Patient with long history of smoking.  Patient is developed epigastric abdominal pain early satiety.  Patient has a normal gastric emptying study.  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  This is probably the cause of the abdominal discomfort.    Will have patient follow-up with vascular surgery for evaluation and treatment of this blockage as this will most likely treat patient's abdominal discomfort.  Patient states discomfort gets worse after eating    Plan; we will send patient to CT surgery will have patient follow-up in 4 weeks  Past Medical History:   Past Medical History:   Diagnosis Date   • Arthritis    • Brow ptosis 2018   • COPD (chronic obstructive pulmonary disease) (CMS/HCC)    • Headache    • Hepatitis B carrier (CMS/HCC)    • Hiatal hernia    • History of transfusion    • Hypothyroidism    • Irritable bowel disease    • Migraines    • Nerve damage     legs bilateral   • Sleep apnea     UNABLE TO WEAR C PAP   • Visual field defect    • Vitiligo        Past Surgical History:  Past Surgical History:   Procedure Laterality Date   • ANTERIOR CERVICAL DISCECTOMY W/ FUSION N/A 2020    Procedure: EXPLORATION OF FUSION, C 5-6, ANTERIOR CERVICAL DISCECTOMY FUSION C4-5, C 6-7;  Surgeon: RAFFAELE Burks MD;  Location:  [facility] OR;  Service: Orthopedic Spine;  Laterality: N/A;   • BACK SURGERY     • BROW LIFT Bilateral 3/13/2018    Procedure: Direct brow lift;  Surgeon: Jesús Garza MD;  Location:  [facility] OR;  Service: ENT   •  SECTION     • COLONOSCOPY  2021    Dr. Miranda, [place], [place]   • HARDWARE REMOVAL N/A 2020    Procedure: REMOVAL OF INSTRUMENTATION;  Surgeon: RAFFAELE Burks MD;  Location:  [facility] OR;  Service: Orthopedic Spine;  Laterality: N/A;   • HYSTERECTOMY      • NECK SURGERY     • SHOULDER SURGERY     • SINUS SURGERY     • SINUS SURGERY     • SPINE SURGERY     • UPPER GASTROINTESTINAL ENDOSCOPY  04/06/2021    Dr. Miranda, [place], [place]   • WRIST SURGERY Right        Family History:  Family History   Problem Relation Age of Onset   • Heart disease Mother         X 2 Stent Placement   • Thyroid disease Mother    • Diverticulitis Mother    • Asthma Mother    • Hypothyroidism Mother    • Heart disease Father         X 5 Stent Placement   • Colon cancer Father    • Prostate cancer Father    • Diverticulitis Father    • Anemia Father    • Heart disease Other    • Polymyalgia rheumatica Other    • Fibromyalgia Other    • Emphysema Other    • Emphysema Other    • Other Other         Colon Problems       Social History:   reports that she quit smoking about 2 years ago. Her smoking use included cigarettes. She has a 24.00 pack-year smoking history. She has never used smokeless tobacco. She reports that she does not drink alcohol and does not use drugs.    Medications:   Prior to Admission medications    Medication Sig Start Date End Date Taking? Authorizing Provider   aspirin 81 MG chewable tablet Chew 81 mg Daily.   Yes Aretha Richmond MD   atorvastatin (LIPITOR) 20 MG tablet TAKE ONE TABLET BY MOUTH DAILY 8/16/19  Yes Francesco Zaidi MD   Cholecalciferol (Vitamin D3) 10 MCG (400 UNIT) capsule Take 1 capsule by mouth Daily.   Yes Aretha Richmond MD   Cyanocobalamin 1000 MCG/ML kit Inject  as directed Every 30 (Thirty) Days.   Yes Aretha Richmond MD   estradiol (ESTRACE) 1 MG tablet TAKE ONE TABLET BY MOUTH DAILY 10/30/19  Yes Francesco Zaidi MD   ezetimibe (ZETIA) 10 MG tablet Take 10 mg by mouth Daily. 4/27/21  Yes Aretha Richmond MD   Ferrous Sulfate Dried (High Potency Iron) 65 MG tablet Take  by mouth.   Yes Aretha Richmond MD   fluorometholone (FML) 0.1 % ophthalmic suspension Administer 0.1 drops to both eyes 2 (Two) Times a Day. 2/14/19   Yes Aretha Richmond MD   furosemide (LASIX) 20 MG tablet Take 20 mg by mouth Daily. TAKES 1 AND 1/2 TABLETS DAILY 9/5/17  Yes Aretha Richmond MD   HM CETIRIZINE HCL 10 MG tablet Take 10 mg by mouth Daily. 3/21/19  Yes Aretha Richmond MD   HYDROcodone-acetaminophen (NORCO)  MG per tablet Take 1 tablet by mouth Every 4 (Four) Hours As Needed for Severe Pain . 6/23/20  Yes RAFFAELE Burks MD   hyoscyamine (Levbid) 0.375 MG 12 hr tablet Take 1 tablet by mouth Every 12 (Twelve) Hours As Needed for Cramping. 4/16/21  Yes Jesús Pisano MD   ibuprofen (ADVIL,MOTRIN) 800 MG tablet Take 1 tablet by mouth Every 6 (Six) Hours As Needed for Mild Pain . 4/16/21  Yes Jesús Pisano MD   ipratropium (ATROVENT) 0.03 % nasal spray 2 sprays into the nostril(s) as directed by provider Every 12 (Twelve) Hours.   Yes Aretha Richmond MD   levothyroxine (SYNTHROID, LEVOTHROID) 100 MCG tablet Take 1 tablet by mouth Daily. 4/22/19  Yes Francesco Zaidi MD   lidocaine (LIDODERM) 5 % Place 1 patch on the skin as directed by provider Daily. Remove & Discard patch within 12 hours or as directed by MD  Patient taking differently: Place 1 patch on the skin as directed by provider Daily. Remove & Discard patch within 12 hours or as directed by MD . STATES USING LIDODERM CREAM NOW OR MIGHT USE PATCH ON LOWER BACK 4/10/19  Yes Francesco Zaidi MD   meloxicam (MOBIC) 7.5 MG tablet Take 7.5 mg by mouth Daily.   Yes Aretha Richmond MD   montelukast (SINGULAIR) 10 MG tablet Take 10 mg by mouth Daily. 2/13/19  Yes Aretha Richmond MD   Potassium Gluconate  MG tablet controlled-release Take 595 mg by mouth Daily.   Yes Aretha Richmond MD   PREMARIN 0.625 MG tablet Take 1 tablet by mouth Daily. 4/19/19  Yes Francesco Zaidi MD   zolpidem (AMBIEN) 5 MG tablet Take 1 tablet by mouth At Night As Needed for Sleep.  Patient taking differently: Take 10 mg by mouth Every Night. 4/19/19  Yes Dyan,  "Francesco LESTER MD   gabapentin (NEURONTIN) 800 MG tablet Take 1 tablet by mouth 3 (Three) Times a Day.  Patient taking differently: Take 800 mg by mouth 2 (Two) Times a Day. TAKES 1/2 DOSE IN MORNING AND ONE IN EVENING 4/19/19   Francesco Zaidi MD       Allergies:  Patient has no known allergies.    ROS:    Review of Systems   Constitutional: Negative for activity change, appetite change, chills, diaphoresis, fatigue, fever and unexpected weight change.   HENT: Negative for sore throat and trouble swallowing.    Respiratory: Negative for shortness of breath.    Gastrointestinal: Positive for abdominal pain. Negative for abdominal distention, anal bleeding, blood in stool, constipation, diarrhea, nausea, rectal pain and vomiting.   Endocrine: Negative for polydipsia, polyphagia and polyuria.   Genitourinary: Negative for difficulty urinating.   Musculoskeletal: Negative for arthralgias.   Skin: Negative for pallor.   Allergic/Immunologic: Negative for food allergies.   Neurological: Negative for weakness and light-headedness.   Psychiatric/Behavioral: Negative for behavioral problems.     Objective     Blood pressure 122/81, pulse 66, height 160 cm (63\"), weight 59.5 kg (131 lb 3.2 oz), not currently breastfeeding.    Physical Exam  Constitutional:       General: She is not in acute distress.     Appearance: She is well-developed. She is not diaphoretic.   HENT:      Head: Normocephalic and atraumatic.   Cardiovascular:      Rate and Rhythm: Normal rate and regular rhythm.      Heart sounds: Normal heart sounds. No murmur heard.   No friction rub. No gallop.    Pulmonary:      Effort: No respiratory distress.      Breath sounds: Normal breath sounds. No wheezing or rales.   Chest:      Chest wall: No tenderness.   Abdominal:      General: Bowel sounds are normal. There is no distension.      Palpations: Abdomen is soft. There is no mass.      Tenderness: There is no abdominal tenderness. There is no guarding or rebound. "      Hernia: No hernia is present.   Musculoskeletal:         General: Normal range of motion.   Skin:     General: Skin is warm and dry.      Coloration: Skin is not pale.      Findings: No erythema or rash.   Neurological:      Mental Status: She is alert and oriented to person, place, and time.   Psychiatric:         Behavior: Behavior normal.         Thought Content: Thought content normal.         Judgment: Judgment normal.          Assessment/Plan   Diagnoses and all orders for this visit:    1. Intestinal angina (CMS/HCC) (Primary)  -     Ambulatory Referral to Cardiothoracic Surgery        * Surgery not found *     Diagnosis Plan   1. Intestinal angina (CMS/HCC)  Ambulatory Referral to Cardiothoracic Surgery       Anticipated Surgical Procedure:  Orders Placed This Encounter   Procedures   • Ambulatory Referral to Cardiothoracic Surgery     Referral Priority:   Routine     Referral Type:   Consultation     Referral Reason:   Specialty Services Required     Referred to Provider:   Rafael Sosa MD     Requested Specialty:   Cardiothoracic Surgery     Number of Visits Requested:   1       The risks, benefits, and alternatives of this procedure have been discussed with the patient or the responsible party- the patient understands and agrees to proceed.

## 2021-05-28 NOTE — PATIENT INSTRUCTIONS
"BMI for Adults  What is BMI?  Body mass index (BMI) is a number that is calculated from a person's weight and height. BMI can help estimate how much of a person's weight is composed of fat. BMI does not measure body fat directly. Rather, it is an alternative to procedures that directly measure body fat, which can be difficult and expensive.  BMI can help identify people who may be at higher risk for certain medical problems.  What are BMI measurements used for?  BMI is used as a screening tool to identify possible weight problems. It helps determine whether a person is obese, overweight, a healthy weight, or underweight.  BMI is useful for:  · Identifying a weight problem that may be related to a medical condition or may increase the risk for medical problems.  · Promoting changes, such as changes in diet and exercise, to help reach a healthy weight. BMI screening can be repeated to see if these changes are working.  How is BMI calculated?  BMI involves measuring your weight in relation to your height. Both height and weight are measured, and the BMI is calculated from those numbers. This can be done either in English ([place] or metric measurements. Note that charts and online BMI calculators are available to help you find your BMI quickly and easily without having to do these calculations yourself.  To calculate your BMI in English ([place] measurements:    1. Measure your weight in pounds (lb).  2. Multiply the number of pounds by 703.  ? For example, for a person who weighs 180 lb, multiply that number by 703, which equals 126,540.  3. Measure your height in inches. Then multiply that number by itself to get a measurement called \"inches squared.\"  ? For example, for a person who is 70 inches tall, the \"inches squared\" measurement is 70 inches x 70 inches, which equals 4,900 inches squared.  4. Divide the total from step 2 (number of lb x 703) by the total from step 3 (inches squared): 126,540 ÷ 4,900 = 25.8. This is " "your BMI.  To calculate your BMI in metric measurements:  1. Measure your weight in kilograms (kg).  2. Measure your height in meters (m). Then multiply that number by itself to get a measurement called \"meters squared.\"  ? For example, for a person who is 1.75 m tall, the \"meters squared\" measurement is 1.75 m x 1.75 m, which is equal to 3.1 meters squared.  3. Divide the number of kilograms (your weight) by the meters squared number. In this example: 70 ÷ 3.1 = 22.6. This is your BMI.  What do the results mean?  BMI charts are used to identify whether you are underweight, normal weight, overweight, or obese. The following guidelines will be used:  · Underweight: BMI less than 18.5.  · Normal weight: BMI between 18.5 and 24.9.  · Overweight: BMI between 25 and 29.9.  · Obese: BMI of 30 or above.  Keep these notes in mind:  · Weight includes both fat and muscle, so someone with a muscular build, such as an athlete, may have a BMI that is higher than 24.9. In cases like these, BMI is not an accurate measure of body fat.  · To determine if excess body fat is the cause of a BMI of 25 or higher, further assessments may need to be done by a health care provider.  · BMI is usually interpreted in the same way for men and women.  Where to find more information  For more information about BMI, including tools to quickly calculate your BMI, go to these websites:  · Centers for Disease Control and Prevention: www.cdc.gov  · American Heart Association: www.heart.org  · National Heart, Lung, and Blood [place]: www.nhlbi.nih.gov  Summary  · Body mass index (BMI) is a number that is calculated from a person's weight and height.  · BMI may help estimate how much of a person's weight is composed of fat. BMI can help identify those who may be at higher risk for certain medical problems.  · BMI can be measured using English measurements or metric measurements.  · BMI charts are used to identify whether you are underweight, normal " weight, overweight, or obese.  This information is not intended to replace advice given to you by your health care provider. Make sure you discuss any questions you have with your health care provider.  Document Revised: 09/09/2020 Document Reviewed: 07/17/2020  Elsevier Patient Education © 2021 [employer]

## 2021-06-21 ENCOUNTER — OFFICE VISIT (OUTPATIENT)
Dept: CARDIAC SURGERY | Facility: CLINIC | Age: 66
End: 2021-06-21

## 2021-06-21 VITALS
TEMPERATURE: 98 F | OXYGEN SATURATION: 99 % | SYSTOLIC BLOOD PRESSURE: 108 MMHG | HEIGHT: 63 IN | HEART RATE: 75 BPM | BODY MASS INDEX: 23.28 KG/M2 | WEIGHT: 131.4 LBS | DIASTOLIC BLOOD PRESSURE: 68 MMHG

## 2021-06-21 DIAGNOSIS — J43.1 PANLOBULAR EMPHYSEMA (HCC): ICD-10-CM

## 2021-06-21 DIAGNOSIS — I65.23 BILATERAL CAROTID ARTERY STENOSIS: ICD-10-CM

## 2021-06-21 DIAGNOSIS — N18.2 CHRONIC KIDNEY DISEASE, STAGE 2 (MILD): ICD-10-CM

## 2021-06-21 DIAGNOSIS — F17.298 NICOTINE DEPENDENCE, OTHER TOBACCO PRODUCT, WITH OTHER NICOTINE-INDUCED DISORDERS: ICD-10-CM

## 2021-06-21 DIAGNOSIS — K55.1 MESENTERIC ARTERY STENOSIS (HCC): Primary | ICD-10-CM

## 2021-06-21 DIAGNOSIS — E78.2 MIXED HYPERLIPIDEMIA: ICD-10-CM

## 2021-06-21 DIAGNOSIS — G47.30 SLEEP APNEA, UNSPECIFIED TYPE: ICD-10-CM

## 2021-06-21 PROCEDURE — 99214 OFFICE O/P EST MOD 30 MIN: CPT | Performed by: THORACIC SURGERY (CARDIOTHORACIC VASCULAR SURGERY)

## 2021-06-21 RX ORDER — PREGABALIN 100 MG/1
150 CAPSULE ORAL NIGHTLY
COMMUNITY

## 2021-06-23 PROBLEM — K55.1 MESENTERIC ARTERY STENOSIS (HCC): Status: ACTIVE | Noted: 2021-06-23

## 2021-06-23 PROBLEM — F17.298: Status: ACTIVE | Noted: 2019-05-23

## 2021-06-23 NOTE — PROGRESS NOTES
6/21/2021    Penny Gtz  1955    Chief Complaint:  Mesenteric artery stenosis    HPI:      PCP:  Danilo Boyle DO  Cardiology:  Dr Kruse  GI:  Dr Miranda, Dr Vázquez     66y.o. female with Hyperlipidemia(stable, increased risk cardiovascular events), Smoker(uncontrolled, increased risk cardiovascular events), COPD(stable, increased risk pulmonary complications) and Chronic Kidney Disease(stable, increased risk renal failure) , carotid stenosis(new, increase risk stroke), mesenteric artery stenosis(new, increase risk cardiovascular events). smokes vape PPD.  moderate constant abdominal pain x 6 months..  Not worse with eating.  Pain with breathing, talking.  Worse with stress.  Asymptomatic carotid stenosis..  No TIA stroke amaurosis.  No MI claudication. No other associated signs, symptoms or modifying factors.     3/2021 Abdominal US:  Celiac 370/100cm/s, /24cm/s. Cholelithiasis.  4/2021 EGD:  Mild gastritis.  Antral biopsy gastropathy  4/2021 Colonoscopy:  Multiple polyps.  Path no malignancy.  4/2021 CT Abdomen Pelvis:  5mm cuts.  Umbilical hernia.  Poor visualization celiac, no significant obstruction.  SMA, MORIAH patent.  6/2021 Gastric Emptying study:  Normal    11/2018 Carotid Duplex([facility]):  Mild scattered plaque, no hemodynamically significant stenosis.     3/2018 ECG:  NSR 64, QTc 410    The following portions of the patient's history were reviewed and updated as appropriate: allergies, current medications, past family history, past medical history, past social history, past surgical history and problem list.  Recent images independently reviewed.  Available laboratory values reviewed.    PMH:  Past Medical History:   Diagnosis Date   • Arthritis    • Brow ptosis 2/21/2018   • COPD (chronic obstructive pulmonary disease) (CMS/HCC)    • Headache    • Hepatitis B carrier (CMS/HCC)    • Hiatal hernia    • History of transfusion    • Hypothyroidism    • Irritable bowel disease    •  Migraines    • Nerve damage     legs bilateral   • Sleep apnea     UNABLE TO WEAR C PAP   • Visual field defect    • Vitiligo      Past Surgical History:   Procedure Laterality Date   • ANTERIOR CERVICAL DISCECTOMY W/ FUSION N/A 2020    Procedure: EXPLORATION OF FUSION, C 5-6, ANTERIOR CERVICAL DISCECTOMY FUSION C4-5, C 6-7;  Surgeon: RAFFAELE Burks MD;  Location:  [facility] OR;  Service: Orthopedic Spine;  Laterality: N/A;   • BACK SURGERY     • BROW LIFT Bilateral 3/13/2018    Procedure: Direct brow lift;  Surgeon: Jesús Garza MD;  Location:  [facility] OR;  Service: ENT   •  SECTION     • [place]   • HARDWARE REMOVAL N/A 2020    Procedure: REMOVAL OF INSTRUMENTATION;  Surgeon: RAFFAELE Burks MD;  Location:  [facility];  Service: Orthopedic Spine;  Laterality: N/A;   • HYSTERECTOMY     • NECK SURGERY     • SHOULDER SURGERY     • SINUS SURGERY     • SINUS SURGERY     • SPINE SURGERY     • UPPER GASTROINTESTINAL ENDOSCOPY  2021    Dr. Miranda, [place], [place]   • WRIST SURGERY Right      Family History   Problem Relation Age of Onset   • Heart disease Mother         X 2 Stent Placement   • Thyroid disease Mother    • Diverticulitis Mother    • Asthma Mother    • Hypothyroidism Mother    • Heart disease Father         X 5 Stent Placement   • Colon cancer Father    • Prostate cancer Father    • Diverticulitis Father    • Anemia Father    • Heart disease Other    • Polymyalgia rheumatica Other    • Fibromyalgia Other    • Emphysema Other    • Emphysema Other    • Other Other         Colon Problems     Social History     Tobacco Use   • Smoking status: Current Every Day Smoker     Packs/day: 0.50     Years: 48.00     Pack years: 24.00     Types: Cigarettes     Last attempt to quit: 2019     Years since quittin.3   • Smokeless tobacco: Never Used   • Tobacco comment: [employer]   Vaping Use   • Vaping Use: Every day   • Substances: Nicotine (5%),  Flavoring   • Devices: Disposable   Substance Use Topics   • Alcohol use: No   • Drug use: Never       ALLERGIES:  No Known Allergies      MEDICATIONS:    Current Outpatient Medications:   •  aspirin 81 MG chewable tablet, Chew 81 mg Daily., Disp: , Rfl:   •  Cholecalciferol (Vitamin D3) 10 MCG (400 UNIT) capsule, Take 1 capsule by mouth Daily., Disp: , Rfl:   •  ezetimibe (ZETIA) 10 MG tablet, Take 10 mg by mouth Daily., Disp: , Rfl:   •  Ferrous Sulfate Dried (High Potency Iron) 65 MG tablet, Take  by mouth., Disp: , Rfl:   •  fluorometholone (FML) 0.1 % ophthalmic suspension, Administer 0.1 drops to both eyes 2 (Two) Times a Day., Disp: , Rfl: 1  •  gabapentin (NEURONTIN) 800 MG tablet, Take 1 tablet by mouth 3 (Three) Times a Day. (Patient taking differently: Take 800 mg by mouth 2 (Two) Times a Day. TAKES 1/2 DOSE IN MORNING AND ONE IN EVENING), Disp: 90 tablet, Rfl: 2  •  HM CETIRIZINE HCL 10 MG tablet, Take 10 mg by mouth Daily., Disp: , Rfl: 0  •  HYDROcodone-acetaminophen (NORCO)  MG per tablet, Take 1 tablet by mouth Every 4 (Four) Hours As Needed for Severe Pain ., Disp: 60 tablet, Rfl: 0  •  ipratropium (ATROVENT) 0.03 % nasal spray, 2 sprays into the nostril(s) as directed by provider Every 12 (Twelve) Hours., Disp: , Rfl:   •  levothyroxine (SYNTHROID, LEVOTHROID) 100 MCG tablet, Take 1 tablet by mouth Daily., Disp: 30 tablet, Rfl: 3  •  meloxicam (MOBIC) 7.5 MG tablet, Take 7.5 mg by mouth Daily., Disp: , Rfl:   •  montelukast (SINGULAIR) 10 MG tablet, Take 10 mg by mouth Daily., Disp: , Rfl: 0  •  Potassium Gluconate  MG tablet controlled-release, Take 595 mg by mouth Daily., Disp: , Rfl:   •  pregabalin (Lyrica) 150 MG capsule, Take 150 mg by mouth 2 (Two) Times a Day., Disp: , Rfl:   •  zolpidem (AMBIEN) 5 MG tablet, Take 1 tablet by mouth At Night As Needed for Sleep. (Patient taking differently: Take 10 mg by mouth Every Night.), Disp: 30 tablet, Rfl: 0  •  estradiol (ESTRACE) 1 MG  "tablet, TAKE ONE TABLET BY MOUTH DAILY, Disp: 30 tablet, Rfl: 3  •  hyoscyamine (Levbid) 0.375 MG 12 hr tablet, Take 1 tablet by mouth Every 12 (Twelve) Hours As Needed for Cramping., Disp: 60 tablet, Rfl: 3  •  ibuprofen (ADVIL,MOTRIN) 800 MG tablet, Take 1 tablet by mouth Every 6 (Six) Hours As Needed for Mild Pain ., Disp: 60 tablet, Rfl: 2  •  lidocaine (LIDODERM) 5 %, Place 1 patch on the skin as directed by provider Daily. Remove & Discard patch within 12 hours or as directed by MD (Patient taking differently: Place 1 patch on the skin as directed by provider Daily. Remove & Discard patch within 12 hours or as directed by MD . STATES USING LIDODERM CREAM NOW OR MIGHT USE PATCH ON LOWER BACK), Disp: 30 patch, Rfl: 3  •  PREMARIN 0.625 MG tablet, Take 1 tablet by mouth Daily., Disp: 90 tablet, Rfl: 3    Review of Systems   Review of Systems   Constitutional: Positive for malaise/fatigue. Negative for weight loss.   Cardiovascular: Positive for leg swelling. Negative for chest pain, claudication and dyspnea on exertion.   Respiratory: Negative for cough and shortness of breath.    Endocrine: Positive for cold intolerance.   Hematologic/Lymphatic: Bruises/bleeds easily.   Skin: Negative for color change and poor wound healing.   Musculoskeletal: Positive for arthritis, back pain, gout, muscle weakness and neck pain.   Gastrointestinal: Positive for abdominal pain and dysphagia.   Neurological: Positive for headaches. Negative for dizziness, numbness and weakness.       Physical Exam   Vitals:    06/21/21 0852 06/21/21 0853   BP: 112/72 108/68   BP Location: Left arm Right arm   Pulse: 75    Temp: 98 °F (36.7 °C)    TempSrc: Infrared    SpO2: 99%    Weight: 59.6 kg (131 lb 6.4 oz)    Height: 160 cm (63\")      Physical Exam  Constitutional:       General: She is not in acute distress.     Appearance: She is not ill-appearing.   HENT:      Right Ear: Hearing normal.      Left Ear: Hearing normal.      Nose: No nasal " deformity.      Mouth/Throat:      Dentition: Normal dentition. Does not have dentures.   Cardiovascular:      Rate and Rhythm: Normal rate and regular rhythm.      Pulses:           Carotid pulses are 2+ on the right side and 2+ on the left side.       Radial pulses are 2+ on the right side and 2+ on the left side.        Dorsalis pedis pulses are 2+ on the right side and 2+ on the left side.        Posterior tibial pulses are 2+ on the right side and 2+ on the left side.      Heart sounds: No murmur heard.        Comments: Mild tenderness mid abdomen diffuse.  Pulmonary:      Effort: Pulmonary effort is normal.      Breath sounds: Normal breath sounds.   Abdominal:      General: There is no distension.      Palpations: Abdomen is soft. There is no mass.      Tenderness: There is generalized abdominal tenderness.   Musculoskeletal:         General: No deformity.      Comments: Gait normal.    Skin:     General: Skin is warm and dry.      Coloration: Skin is not pale.      Findings: No erythema.      Comments: No venous staining   Neurological:      Mental Status: She is alert and oriented to person, place, and time.   Psychiatric:         Speech: Speech normal.         Behavior: Behavior is cooperative.         Thought Content: Thought content normal.         Judgment: Judgment normal.         BUN   Date Value Ref Range Status   04/16/2021 13 8 - 23 mg/dL Final     Creatinine   Date Value Ref Range Status   04/16/2021 0.75 0.57 - 1.00 mg/dL Final     eGFR Non  Amer   Date Value Ref Range Status   04/16/2021 77 >60 mL/min/1.73 Final       ASSESSMENT:  Diagnoses and all orders for this visit:    1. Mesenteric artery stenosis (CMS/HCC) (Primary)  -     CT Angiogram Abdomen Pelvis; Future    2. Sleep apnea, unspecified type    3. Mixed hyperlipidemia    4. Panlobular emphysema (CMS/HCC)    5. Chronic kidney disease, stage 2 (mild)    6. Nicotine dependence, other tobacco product, with other nicotine-induced  disorders    7. Bilateral carotid artery stenosis    PLAN:  Detailed discussion with Penny Gtz regarding situation and options.  Moderate constant diffuse mid abdominal pain.  Story not consistent for mesenteric ischemia.  Increase velocities on duplex. Poor CT imaging to define mesenteric artery stenosis. Additional imaging required to evaluate location and extent of atherosclerotic disease. Multiple risk factors with severe comorbidities.    Risks, benefits discussed.  Understands and wishes to proceed with plan.  Do not anticipate hospitalization.    CTA Aorta Abdomen Pelvis (0.5mm cuts, mesenteric artery stenosis)    Return after above studies complete  Smoking cessation advised and assistance options offered including behavioral counseling ([facility]), Nicotine replacement therapy (patches or gum), pharmacologic therapy (Chantix, Wellbutrin). patient understands that continued use of tobacco products increases risk of heart disease, stroke, cancer; counseling for 3-5min.    Recommended regular physical activity, progressive walking program.  Continue current medications as directed.  Advance Care Planning   ACP discussion was declined by the patient. Patient does not have an advance directive, declines further assistance.    Thank you for the opportunity to participate in this patient's care.    Copy to primary care provider.

## 2021-06-28 ENCOUNTER — TRANSCRIBE ORDERS (OUTPATIENT)
Dept: GENERAL RADIOLOGY | Facility: HOSPITAL | Age: 66
End: 2021-06-28

## 2021-06-28 DIAGNOSIS — K55.1 MESENTERIC ARTERY STENOSIS (HCC): Primary | ICD-10-CM

## 2021-06-30 ENCOUNTER — HOSPITAL ENCOUNTER (OUTPATIENT)
Dept: CT IMAGING | Facility: HOSPITAL | Age: 66
Discharge: HOME | End: 2021-06-30

## 2021-06-30 ENCOUNTER — LAB (OUTPATIENT)
Dept: LAB | Facility: HOSPITAL | Age: 66
End: 2021-06-30

## 2021-06-30 DIAGNOSIS — K55.1 MESENTERIC ARTERY STENOSIS (HCC): ICD-10-CM

## 2021-06-30 LAB
BUN SERPL-MCNC: 17 MG/DL (ref 8–23)
CREAT SERPL-MCNC: 0.81 MG/DL (ref 0.57–1)
GFR SERPL CREATININE-BSD FRML MDRD: 71 ML/MIN/1.73
HOLD SPECIMEN: NORMAL
WHOLE BLOOD HOLD SPECIMEN: NORMAL

## 2021-06-30 PROCEDURE — 36415 COLL VENOUS BLD VENIPUNCTURE: CPT

## 2021-06-30 PROCEDURE — 84520 ASSAY OF UREA NITROGEN: CPT

## 2021-06-30 PROCEDURE — 74174 CTA ABD&PLVS W/CONTRAST: CPT

## 2021-06-30 PROCEDURE — 82565 ASSAY OF CREATININE: CPT

## 2021-06-30 PROCEDURE — 0 IOPAMIDOL PER 1 ML: Performed by: THORACIC SURGERY (CARDIOTHORACIC VASCULAR SURGERY)

## 2021-06-30 RX ADMIN — IOPAMIDOL 90 ML: 755 INJECTION, SOLUTION INTRAVENOUS at 13:25

## 2021-07-12 ENCOUNTER — OFFICE VISIT (OUTPATIENT)
Dept: CARDIAC SURGERY | Facility: CLINIC | Age: 66
End: 2021-07-12

## 2021-07-12 VITALS
HEART RATE: 76 BPM | WEIGHT: 132.6 LBS | OXYGEN SATURATION: 98 % | SYSTOLIC BLOOD PRESSURE: 96 MMHG | TEMPERATURE: 97.5 F | BODY MASS INDEX: 23.5 KG/M2 | HEIGHT: 63 IN | DIASTOLIC BLOOD PRESSURE: 60 MMHG

## 2021-07-12 DIAGNOSIS — J43.1 PANLOBULAR EMPHYSEMA (HCC): ICD-10-CM

## 2021-07-12 DIAGNOSIS — I65.23 BILATERAL CAROTID ARTERY STENOSIS: ICD-10-CM

## 2021-07-12 DIAGNOSIS — N18.2 CHRONIC KIDNEY DISEASE, STAGE 2 (MILD): ICD-10-CM

## 2021-07-12 DIAGNOSIS — K58.9 IRRITABLE BOWEL SYNDROME, UNSPECIFIED TYPE: ICD-10-CM

## 2021-07-12 DIAGNOSIS — K55.1 MESENTERIC ARTERY STENOSIS (HCC): Primary | ICD-10-CM

## 2021-07-12 DIAGNOSIS — F17.298 NICOTINE DEPENDENCE, OTHER TOBACCO PRODUCT, WITH OTHER NICOTINE-INDUCED DISORDERS: ICD-10-CM

## 2021-07-12 DIAGNOSIS — B18.1 HEPATITIS B CARRIER (HCC): ICD-10-CM

## 2021-07-12 DIAGNOSIS — E78.2 MIXED HYPERLIPIDEMIA: ICD-10-CM

## 2021-07-12 DIAGNOSIS — G89.4 CHRONIC PAIN SYNDROME: ICD-10-CM

## 2021-07-12 PROCEDURE — 99214 OFFICE O/P EST MOD 30 MIN: CPT | Performed by: THORACIC SURGERY (CARDIOTHORACIC VASCULAR SURGERY)

## 2021-07-28 PROBLEM — G89.4 CHRONIC PAIN SYNDROME: Status: ACTIVE | Noted: 2019-04-08

## 2021-07-28 NOTE — PROGRESS NOTES
7/12/2021    Penny Gtz  1955    Chief Complaint:    Chief Complaint   Patient presents with   • Mesenteric Artery Stenosis       HPI:      PCP:  Danilo Boyle DO  Cardiology:  Dr Kruse  GI:  Dr Miranda, Dr Vázquez     66y.o. female with Hyperlipidemia(stable, increased risk cardiovascular events), Smoker(uncontrolled, increased risk cardiovascular events), COPD(stable, increased risk pulmonary complications) and Chronic Kidney Disease(stable, increased risk renal failure) , carotid stenosis(new, increase risk stroke), mesenteric artery stenosis(new, increase risk cardiovascular events). smokes vape PPD.  moderate constant abdominal pain x 6 months..  Not worse with eating.  Pain with breathing, talking.  Worse with stress.  Asymptomatic carotid stenosis..  No TIA stroke amaurosis.  No MI claudication. No other associated signs, symptoms or modifying factors.     3/2021 Abdominal US:  Celiac 370/100cm/s, /24cm/s. Cholelithiasis.  4/2021 EGD:  Mild gastritis.  Antral biopsy gastropathy  4/2021 Colonoscopy:  Multiple polyps.  Path no malignancy.  4/2021 CT Abdomen Pelvis:  5mm cuts.  Umbilical hernia.  Poor visualization celiac, no significant obstruction.  SMA, MORIAH patent.  6/2021 Gastric Emptying study:  Normal  6/2021 CTA Abdomen:  No significant stenosis Celiac SMA MORIAH.     11/2018 Carotid Duplex([facility]):  Mild scattered plaque, no hemodynamically significant stenosis.     3/2018 ECG:  NSR 64, QTc 410      The following portions of the patient's history were reviewed and updated as appropriate: allergies, current medications, past family history, past medical history, past social history, past surgical history and problem list.  Recent images independently reviewed.  Available laboratory values reviewed.    PMH:  Past Medical History:   Diagnosis Date   • Arthritis    • Brow ptosis 2/21/2018   • COPD (chronic obstructive pulmonary disease) (CMS/HCC)    • Headache    • Hepatitis B carrier  (CMS/HCC)    • Hiatal hernia    • History of transfusion    • Hypothyroidism    • Irritable bowel disease    • Migraines    • Nerve damage     legs bilateral   • Sleep apnea     UNABLE TO WEAR C PAP   • Visual field defect    • Vitiligo      Past Surgical History:   Procedure Laterality Date   • ANTERIOR CERVICAL DISCECTOMY W/ FUSION N/A 2020    Procedure: EXPLORATION OF FUSION, C 5-6, ANTERIOR CERVICAL DISCECTOMY FUSION C4-5, C 6-7;  Surgeon: RAFFAELE Burks MD;  Location:  [facility] OR;  Service: Orthopedic Spine;  Laterality: N/A;   • BACK SURGERY     • BROW LIFT Bilateral 3/13/2018    Procedure: Direct brow lift;  Surgeon: Jesús Garza MD;  Location:  [facility] OR;  Service: ENT   •  SECTION     • [place]   • HARDWARE REMOVAL N/A 2020    Procedure: REMOVAL OF INSTRUMENTATION;  Surgeon: RAFFAELE Burks MD;  Location:  [facility] OR;  Service: Orthopedic Spine;  Laterality: N/A;   • HYSTERECTOMY     • NECK SURGERY     • SHOULDER SURGERY     • SINUS SURGERY     • SINUS SURGERY     • SPINE SURGERY     • UPPER GASTROINTESTINAL ENDOSCOPY  2021    Dr. Miranda, [place], [place]   • WRIST SURGERY Right      Family History   Problem Relation Age of Onset   • Heart disease Mother         X 2 Stent Placement   • Thyroid disease Mother    • Diverticulitis Mother    • Asthma Mother    • Hypothyroidism Mother    • Heart disease Father         X 5 Stent Placement   • Colon cancer Father    • Prostate cancer Father    • Diverticulitis Father    • Anemia Father    • Heart disease Other    • Polymyalgia rheumatica Other    • Fibromyalgia Other    • Emphysema Other    • Emphysema Other    • Other Other         Colon Problems     Social History     Tobacco Use   • Smoking status: Current Every Day Smoker     Packs/day: 0.50     Years: 48.00     Pack years: 24.00     Types: Cigarettes     Last attempt to quit: 2019     Years since quittin.4   • Smokeless  tobacco: Never Used   • Tobacco comment: [employer]   Vaping Use   • Vaping Use: Every day   • Substances: Nicotine (5%), Flavoring   • Devices: Disposable   Substance Use Topics   • Alcohol use: No   • Drug use: Never       ALLERGIES:  No Known Allergies      MEDICATIONS:    Current Outpatient Medications:   •  aspirin 81 MG chewable tablet, Chew 81 mg Daily., Disp: , Rfl:   •  Cholecalciferol (Vitamin D3) 10 MCG (400 UNIT) capsule, Take 1 capsule by mouth Daily., Disp: , Rfl:   •  estradiol (ESTRACE) 1 MG tablet, TAKE ONE TABLET BY MOUTH DAILY, Disp: 30 tablet, Rfl: 3  •  ezetimibe (ZETIA) 10 MG tablet, Take 10 mg by mouth Daily., Disp: , Rfl:   •  Ferrous Sulfate Dried (High Potency Iron) 65 MG tablet, Take  by mouth., Disp: , Rfl:   •  fluorometholone (FML) 0.1 % ophthalmic suspension, Administer 0.1 drops to both eyes 2 (Two) Times a Day., Disp: , Rfl: 1  •  gabapentin (NEURONTIN) 800 MG tablet, Take 1 tablet by mouth 3 (Three) Times a Day. (Patient taking differently: Take 800 mg by mouth 2 (Two) Times a Day. TAKES 1/2 DOSE IN MORNING AND ONE IN EVENING), Disp: 90 tablet, Rfl: 2  •  HM CETIRIZINE HCL 10 MG tablet, Take 10 mg by mouth Daily., Disp: , Rfl: 0  •  HYDROcodone-acetaminophen (NORCO)  MG per tablet, Take 1 tablet by mouth Every 4 (Four) Hours As Needed for Severe Pain ., Disp: 60 tablet, Rfl: 0  •  hyoscyamine (Levbid) 0.375 MG 12 hr tablet, Take 1 tablet by mouth Every 12 (Twelve) Hours As Needed for Cramping., Disp: 60 tablet, Rfl: 3  •  ibuprofen (ADVIL,MOTRIN) 800 MG tablet, Take 1 tablet by mouth Every 6 (Six) Hours As Needed for Mild Pain ., Disp: 60 tablet, Rfl: 2  •  ipratropium (ATROVENT) 0.03 % nasal spray, 2 sprays into the nostril(s) as directed by provider Every 12 (Twelve) Hours., Disp: , Rfl:   •  levothyroxine (SYNTHROID, LEVOTHROID) 100 MCG tablet, Take 1 tablet by mouth Daily., Disp: 30 tablet, Rfl: 3  •  lidocaine (LIDODERM) 5 %, Place 1 patch on the skin as directed by provider  "Daily. Remove & Discard patch within 12 hours or as directed by MD (Patient taking differently: Place 1 patch on the skin as directed by provider Daily. Remove & Discard patch within 12 hours or as directed by MD . STATES USING LIDODERM CREAM NOW OR MIGHT USE PATCH ON LOWER BACK), Disp: 30 patch, Rfl: 3  •  meloxicam (MOBIC) 7.5 MG tablet, Take 7.5 mg by mouth Daily., Disp: , Rfl:   •  montelukast (SINGULAIR) 10 MG tablet, Take 10 mg by mouth Daily., Disp: , Rfl: 0  •  Potassium Gluconate  MG tablet controlled-release, Take 595 mg by mouth Daily., Disp: , Rfl:   •  pregabalin (Lyrica) 150 MG capsule, Take 150 mg by mouth 2 (Two) Times a Day., Disp: , Rfl:   •  PREMARIN 0.625 MG tablet, Take 1 tablet by mouth Daily., Disp: 90 tablet, Rfl: 3  •  zolpidem (AMBIEN) 5 MG tablet, Take 1 tablet by mouth At Night As Needed for Sleep. (Patient taking differently: Take 10 mg by mouth Every Night.), Disp: 30 tablet, Rfl: 0    Review of Systems   Review of Systems   Constitutional: Positive for malaise/fatigue. Negative for weight loss.   Cardiovascular: Positive for leg swelling. Negative for chest pain, claudication and dyspnea on exertion.   Respiratory: Negative for cough and shortness of breath.    Endocrine: Positive for cold intolerance.   Hematologic/Lymphatic: Bruises/bleeds easily.   Skin: Negative for color change and poor wound healing.   Musculoskeletal: Positive for arthritis, back pain, gout, muscle weakness and neck pain.   Gastrointestinal: Positive for abdominal pain and dysphagia.   Neurological: Positive for headaches. Negative for dizziness, numbness and weakness.       Physical Exam   Vitals:    07/12/21 0836 07/12/21 0837   BP: 98/62 96/60   BP Location: Left arm Right arm   Pulse: 76    Temp: 97.5 °F (36.4 °C)    TempSrc: Infrared    SpO2: 98%    Weight: 60.1 kg (132 lb 9.6 oz)    Height: 160 cm (63\")      Body surface area is 1.62 meters squared.  Body mass index is 23.49 kg/m².  Physical " Exam  Constitutional:       General: She is not in acute distress.     Appearance: She is not ill-appearing.   HENT:      Right Ear: Hearing normal.      Left Ear: Hearing normal.      Nose: No nasal deformity.      Mouth/Throat:      Dentition: Normal dentition. Does not have dentures.   Cardiovascular:      Rate and Rhythm: Normal rate and regular rhythm.      Pulses:           Carotid pulses are 2+ on the right side and 2+ on the left side.       Radial pulses are 2+ on the right side and 2+ on the left side.        Posterior tibial pulses are 2+ on the right side and 2+ on the left side.      Heart sounds: No murmur heard.     Pulmonary:      Effort: Pulmonary effort is normal.      Breath sounds: Normal breath sounds.   Abdominal:      General: There is no distension.      Palpations: Abdomen is soft. There is no mass.      Tenderness: There is no abdominal tenderness.   Musculoskeletal:         General: No deformity.      Comments: Gait normal.    Skin:     General: Skin is warm and dry.      Coloration: Skin is not pale.      Findings: No erythema.      Comments: No venous staining   Neurological:      Mental Status: She is alert and oriented to person, place, and time.   Psychiatric:         Speech: Speech normal.         Behavior: Behavior is cooperative.         Thought Content: Thought content normal.         Judgment: Judgment normal.         BUN   Date Value Ref Range Status   06/30/2021 17 8 - 23 mg/dL Final     Creatinine   Date Value Ref Range Status   06/30/2021 0.81 0.57 - 1.00 mg/dL Final     eGFR Non  Amer   Date Value Ref Range Status   06/30/2021 71 >60 mL/min/1.73 Final       ASSESSMENT:  Diagnoses and all orders for this visit:    1. Mesenteric artery stenosis (CMS/HCC) (Primary)    2. Nicotine dependence, other tobacco product, with other nicotine-induced disorders    3. Mixed hyperlipidemia    4. Irritable bowel syndrome, unspecified type    5. Hepatitis B carrier (CMS/HCC)    6.  Panlobular emphysema (CMS/HCC)    7. Bilateral carotid artery stenosis    8. Chronic kidney disease, stage 2 (mild)    9. Chronic pain syndrome    PLAN:  Detailed discussion with Penny Gtz regarding situation and options.  No significant mesenteric artery stenosis.  Symptoms due to other causes.  Multiple risk factors with severe comorbidities.  No intervention indicated at this time.  Risks, benefits discussed.  Understands and wishes to proceed with plan.    Return as needed  Smoking cessation advised and assistance options offered including behavioral counseling ([facility]), Nicotine replacement therapy (patches or gum), pharmacologic therapy (Chantix, Wellbutrin). patient understands that continued use of tobacco products increases risk of heart disease, stroke, cancer; counseling for 3-5min.    Recommended regular physical activity, progressive walking program.  Continue current medications as directed.  Advance Care Planning   ACP discussion was declined by the patient. Patient does not have an advance directive, declines further assistance.    Thank you for the opportunity to participate in this patient's care.    Copy to primary care provider.

## 2021-07-29 ENCOUNTER — OFFICE VISIT (OUTPATIENT)
Dept: GASTROENTEROLOGY | Facility: CLINIC | Age: 66
End: 2021-07-29

## 2021-07-29 VITALS
HEIGHT: 63 IN | WEIGHT: 133.4 LBS | BODY MASS INDEX: 23.64 KG/M2 | SYSTOLIC BLOOD PRESSURE: 109 MMHG | HEART RATE: 62 BPM | DIASTOLIC BLOOD PRESSURE: 64 MMHG

## 2021-07-29 DIAGNOSIS — R10.13 EPIGASTRIC PAIN: Primary | ICD-10-CM

## 2021-07-29 PROCEDURE — 99212 OFFICE O/P EST SF 10 MIN: CPT | Performed by: INTERNAL MEDICINE

## 2021-07-29 NOTE — PATIENT INSTRUCTIONS
"BMI for Adults  What is BMI?  Body mass index (BMI) is a number that is calculated from a person's weight and height. BMI can help estimate how much of a person's weight is composed of fat. BMI does not measure body fat directly. Rather, it is an alternative to procedures that directly measure body fat, which can be difficult and expensive.  BMI can help identify people who may be at higher risk for certain medical problems.  What are BMI measurements used for?  BMI is used as a screening tool to identify possible weight problems. It helps determine whether a person is obese, overweight, a healthy weight, or underweight.  BMI is useful for:  · Identifying a weight problem that may be related to a medical condition or may increase the risk for medical problems.  · Promoting changes, such as changes in diet and exercise, to help reach a healthy weight. BMI screening can be repeated to see if these changes are working.  How is BMI calculated?  BMI involves measuring your weight in relation to your height. Both height and weight are measured, and the BMI is calculated from those numbers. This can be done either in English (U[place] or metric measurements. Note that charts and online BMI calculators are available to help you find your BMI quickly and easily without having to do these calculations yourself.  To calculate your BMI in English ([place] measurements:    1. Measure your weight in pounds (lb).  2. Multiply the number of pounds by 703.  ? For example, for a person who weighs 180 lb, multiply that number by 703, which equals 126,540.  3. Measure your height in inches. Then multiply that number by itself to get a measurement called \"inches squared.\"  ? For example, for a person who is 70 inches tall, the \"inches squared\" measurement is 70 inches x 70 inches, which equals 4,900 inches squared.  4. Divide the total from step 2 (number of lb x 703) by the total from step 3 (inches squared): 126,540 ÷ 4,900 = 25.8. This is " "your BMI.  To calculate your BMI in metric measurements:  1. Measure your weight in kilograms (kg).  2. Measure your height in meters (m). Then multiply that number by itself to get a measurement called \"meters squared.\"  ? For example, for a person who is 1.75 m tall, the \"meters squared\" measurement is 1.75 m x 1.75 m, which is equal to 3.1 meters squared.  3. Divide the number of kilograms (your weight) by the meters squared number. In this example: 70 ÷ 3.1 = 22.6. This is your BMI.  What do the results mean?  BMI charts are used to identify whether you are underweight, normal weight, overweight, or obese. The following guidelines will be used:  · Underweight: BMI less than 18.5.  · Normal weight: BMI between 18.5 and 24.9.  · Overweight: BMI between 25 and 29.9.  · Obese: BMI of 30 or above.  Keep these notes in mind:  · Weight includes both fat and muscle, so someone with a muscular build, such as an athlete, may have a BMI that is higher than 24.9. In cases like these, BMI is not an accurate measure of body fat.  · To determine if excess body fat is the cause of a BMI of 25 or higher, further assessments may need to be done by a health care provider.  · BMI is usually interpreted in the same way for men and women.  Where to find more information  For more information about BMI, including tools to quickly calculate your BMI, go to these websites:  · Centers for Disease Control and Prevention: www.cdc.gov  · American Heart Association: www.heart.org  · National Heart, Lung, and Blood [place]: www.nhlbi.nih.gov  Summary  · Body mass index (BMI) is a number that is calculated from a person's weight and height.  · BMI may help estimate how much of a person's weight is composed of fat. BMI can help identify those who may be at higher risk for certain medical problems.  · BMI can be measured using English measurements or metric measurements.  · BMI charts are used to identify whether you are underweight, normal " weight, overweight, or obese.  This information is not intended to replace advice given to you by your health care provider. Make sure you discuss any questions you have with your health care provider.  Document Revised: 09/09/2020 Document Reviewed: 07/17/2020  Elsevier Patient Education © 2021 [employer]

## 2021-07-29 NOTE — PROGRESS NOTES
[facility]      Chief Complaint:   Chief Complaint   Patient presents with   • 4 week f/u       Subjective     HPI:   Patient with improvement in epigastric abdominal pain.  Patient states she cannot eat large meals or she does get pain patient has seen cardiovascular surgery who states that the vascular disease not significant enough to cause patient's symptoms.  Patient states that eating small meals works and she actually prefers this as she is worried about gaining weight.  At this time there would be no need for further evaluation.    Plan; we will have patient follow-up as needed for abdominal pain.  Patient has been followed for abdominal pain and history of polyps by gastroenterology patient to follow-up in 4 years for repeat EGD and colonoscopy  Past Medical History:   Past Medical History:   Diagnosis Date   • Arthritis    • Brow ptosis 2018   • COPD (chronic obstructive pulmonary disease) (CMS/HCC)    • Headache    • Hepatitis B carrier (CMS/HCC)    • Hiatal hernia    • History of transfusion    • Hypothyroidism    • Irritable bowel disease    • Migraines    • Nerve damage     legs bilateral   • Sleep apnea     UNABLE TO WEAR C PAP   • Visual field defect    • Vitiligo        Past Surgical History:    Past Surgical History:   Procedure Laterality Date   • ANTERIOR CERVICAL DISCECTOMY W/ FUSION N/A 2020    Procedure: EXPLORATION OF FUSION, C 5-6, ANTERIOR CERVICAL DISCECTOMY FUSION C4-5, C 6-7;  Surgeon: RAFFAELE Burks MD;  Location: [facility] OR;  Service: Orthopedic Spine;  Laterality: N/A;   • BACK SURGERY     • BROW LIFT Bilateral 3/13/2018    Procedure: Direct brow lift;  Surgeon: Jesús Garza MD;  Location:  [facility] OR;  Service: ENT   •  SECTION     • COLONOSCOPY  2021    Dr. Miranda, [place], [place]   • HARDWARE REMOVAL N/A 2020    Procedure: REMOVAL OF INSTRUMENTATION;  Surgeon: RAFFAELE Burks MD;  Location:  [facility] OR;  Service:  Orthopedic Spine;  Laterality: N/A;   • HYSTERECTOMY     • NECK SURGERY     • SHOULDER SURGERY     • SINUS SURGERY     • SINUS SURGERY     • SPINE SURGERY     • UPPER GASTROINTESTINAL ENDOSCOPY  04/06/2021    Dr. Miranda, [place], [place]   • WRIST SURGERY Right        Family History:  Family History   Problem Relation Age of Onset   • Heart disease Mother         X 2 Stent Placement   • Thyroid disease Mother    • Diverticulitis Mother    • Asthma Mother    • Hypothyroidism Mother    • Heart disease Father         X 5 Stent Placement   • Colon cancer Father    • Prostate cancer Father    • Diverticulitis Father    • Anemia Father    • Heart disease Other    • Polymyalgia rheumatica Other    • Fibromyalgia Other    • Emphysema Other    • Emphysema Other    • Other Other         Colon Problems       Social History:   reports that she has been smoking cigarettes. She has a 24.00 pack-year smoking history. She has never used smokeless tobacco. She reports that she does not drink alcohol and does not use drugs.    Medications:   Prior to Admission medications    Medication Sig Start Date End Date Taking? Authorizing Provider   aspirin 81 MG chewable tablet Chew 81 mg Daily.   Yes Aretha Richmond MD   Cholecalciferol (Vitamin D3) 10 MCG (400 UNIT) capsule Take 1 capsule by mouth Daily.   Yes Aretha Richmond MD   estradiol (ESTRACE) 1 MG tablet TAKE ONE TABLET BY MOUTH DAILY 10/30/19  Yes Francesco Zaidi MD   ezetimibe (ZETIA) 10 MG tablet Take 10 mg by mouth Daily. 4/27/21  Yes Aretha Richmond MD   Ferrous Sulfate Dried (High Potency Iron) 65 MG tablet Take  by mouth.   Yes Aretha Richmond MD   fluorometholone (FML) 0.1 % ophthalmic suspension Administer 0.1 drops to both eyes 2 (Two) Times a Day. 2/14/19  Yes Aretha Richmond MD   gabapentin (NEURONTIN) 800 MG tablet Take 1 tablet by mouth 3 (Three) Times a Day.  Patient taking differently: Take 800 mg by mouth 2 (Two) Times a Day. TAKES 1/2  DOSE IN MORNING AND ONE IN EVENING 4/19/19  Yes Francesco Zaidi MD   HM CETIRIZINE HCL 10 MG tablet Take 10 mg by mouth Daily. 3/21/19  Yes Aretha Richmond MD   HYDROcodone-acetaminophen (NORCO)  MG per tablet Take 1 tablet by mouth Every 4 (Four) Hours As Needed for Severe Pain . 6/23/20  Yes RAFFAELE Burks MD   hyoscyamine (Levbid) 0.375 MG 12 hr tablet Take 1 tablet by mouth Every 12 (Twelve) Hours As Needed for Cramping. 4/16/21  Yes Jesús Pisano MD   ibuprofen (ADVIL,MOTRIN) 800 MG tablet Take 1 tablet by mouth Every 6 (Six) Hours As Needed for Mild Pain . 4/16/21  Yes Jesús Pisano MD   ipratropium (ATROVENT) 0.03 % nasal spray 2 sprays into the nostril(s) as directed by provider Every 12 (Twelve) Hours.   Yes Aretha Richmond MD   levothyroxine (SYNTHROID, LEVOTHROID) 100 MCG tablet Take 1 tablet by mouth Daily. 4/22/19  Yes Francesco Zaidi MD   lidocaine (LIDODERM) 5 % Place 1 patch on the skin as directed by provider Daily. Remove & Discard patch within 12 hours or as directed by MD  Patient taking differently: Place 1 patch on the skin as directed by provider Daily. Remove & Discard patch within 12 hours or as directed by MD . STATES USING LIDODERM CREAM NOW OR MIGHT USE PATCH ON LOWER BACK 4/10/19  Yes Francesco Zaidi MD   meloxicam (MOBIC) 7.5 MG tablet Take 7.5 mg by mouth Daily.   Yes Aretha Richmond MD   montelukast (SINGULAIR) 10 MG tablet Take 10 mg by mouth Daily. 2/13/19  Yes Aretha Richmond MD   Potassium Gluconate  MG tablet controlled-release Take 595 mg by mouth Daily.   Yes Aretha Richmond MD   pregabalin (Lyrica) 150 MG capsule Take 150 mg by mouth 2 (Two) Times a Day.   Yes Aretha Richmond MD   PREMARIN 0.625 MG tablet Take 1 tablet by mouth Daily. 4/19/19  Yes Francesco Zaidi MD   zolpidem (AMBIEN) 5 MG tablet Take 1 tablet by mouth At Night As Needed for Sleep.  Patient taking differently: Take 10 mg by mouth Every Night.  "4/19/19  Yes Francesco Zaidi MD       Allergies:  Patient has no known allergies.    ROS:    Review of Systems   Constitutional: Negative for activity change, appetite change, chills, diaphoresis, fatigue, fever and unexpected weight change.   HENT: Negative for sore throat and trouble swallowing.    Respiratory: Negative for shortness of breath.    Gastrointestinal: Negative for abdominal distention, abdominal pain, anal bleeding, blood in stool, constipation, diarrhea, nausea, rectal pain and vomiting.   Endocrine: Negative for polydipsia, polyphagia and polyuria.   Genitourinary: Negative for difficulty urinating.   Musculoskeletal: Negative for arthralgias.   Skin: Negative for pallor.   Allergic/Immunologic: Negative for food allergies.   Neurological: Negative for weakness and light-headedness.   Psychiatric/Behavioral: Negative for behavioral problems.     Objective     Blood pressure 109/64, pulse 62, height 160 cm (63\"), weight 60.5 kg (133 lb 6.4 oz), not currently breastfeeding.    Physical Exam  Constitutional:       General: She is not in acute distress.     Appearance: She is well-developed. She is not diaphoretic.   HENT:      Head: Normocephalic and atraumatic.   Cardiovascular:      Rate and Rhythm: Normal rate and regular rhythm.      Heart sounds: Normal heart sounds. No murmur heard.   No friction rub. No gallop.    Pulmonary:      Effort: No respiratory distress.      Breath sounds: Normal breath sounds. No wheezing or rales.   Chest:      Chest wall: No tenderness.   Abdominal:      General: Bowel sounds are normal. There is no distension.      Palpations: Abdomen is soft. There is no mass.      Tenderness: There is no abdominal tenderness. There is no guarding or rebound.      Hernia: No hernia is present.   Musculoskeletal:         General: Normal range of motion.   Skin:     General: Skin is warm and dry.      Coloration: Skin is not pale.      Findings: No erythema or rash.   Neurological: "      Mental Status: She is alert and oriented to person, place, and time.   Psychiatric:         Behavior: Behavior normal.         Thought Content: Thought content normal.         Judgment: Judgment normal.          Assessment/Plan   Diagnoses and all orders for this visit:    1. Epigastric pain (Primary)        * Surgery not found *     Diagnosis Plan   1. Epigastric pain         Anticipated Surgical Procedure:  No orders of the defined types were placed in this encounter.      The risks, benefits, and alternatives of this procedure have been discussed with the patient or the responsible party- the patient understands and agrees to proceed.

## 2021-09-15 ENCOUNTER — OFFICE VISIT (OUTPATIENT)
Dept: GASTROENTEROLOGY | Facility: CLINIC | Age: 66
End: 2021-09-15

## 2021-09-15 VITALS
HEIGHT: 63 IN | BODY MASS INDEX: 23.21 KG/M2 | HEART RATE: 65 BPM | WEIGHT: 131 LBS | DIASTOLIC BLOOD PRESSURE: 61 MMHG | SYSTOLIC BLOOD PRESSURE: 119 MMHG

## 2021-09-15 DIAGNOSIS — R10.13 EPIGASTRIC PAIN: Primary | ICD-10-CM

## 2021-09-15 PROCEDURE — 99214 OFFICE O/P EST MOD 30 MIN: CPT | Performed by: INTERNAL MEDICINE

## 2021-09-15 RX ORDER — LIDOCAINE 36 MG/1
PATCH TOPICAL AS NEEDED
COMMUNITY
Start: 2021-08-23

## 2021-09-15 RX ORDER — ZOLPIDEM TARTRATE 12.5 MG/1
12.5 TABLET, FILM COATED, EXTENDED RELEASE ORAL NIGHTLY PRN
COMMUNITY
Start: 2021-08-19

## 2021-09-15 RX ORDER — DEXTROSE AND SODIUM CHLORIDE 5; .45 G/100ML; G/100ML
30 INJECTION, SOLUTION INTRAVENOUS CONTINUOUS PRN
Status: CANCELLED | OUTPATIENT
Start: 2021-10-01

## 2021-09-15 RX ORDER — PREGABALIN 50 MG/1
50 CAPSULE ORAL NIGHTLY
COMMUNITY
Start: 2021-09-11

## 2021-09-15 NOTE — PROGRESS NOTES
[facility]      Chief Complaint:   Chief Complaint   Patient presents with   • Abdominal Pain       Subjective     HPI:   Patient with epigastric abdominal pain and dysphagia.  Patient states that time she chokes on food to where she has vomited up.  Patient states she has decreased her diet to small meals otherwise she developed severe pain.  Patient did have an ultrasound which showed possible blockage but CTA did not show this blockage.  Patient continues to have the abdominal pain patient has taken proton pump inhibitor in the past but is no longer taking 1.    Plan; we will schedule patient for EGD to evaluate    Past Medical History:   Past Medical History:   Diagnosis Date   • Arthritis    • Brow ptosis 2018   • COPD (chronic obstructive pulmonary disease) (CMS/HCC)    • Headache    • Hepatitis B carrier (CMS/HCC)    • Hiatal hernia    • History of transfusion    • Hypothyroidism    • Irritable bowel disease    • Migraines    • Nerve damage     legs bilateral   • Sleep apnea     UNABLE TO WEAR C PAP   • Visual field defect    • Vitiligo        Past Surgical History:  Past Surgical History:   Procedure Laterality Date   • ANTERIOR CERVICAL DISCECTOMY W/ FUSION N/A 2020    Procedure: EXPLORATION OF FUSION, C 5-6, ANTERIOR CERVICAL DISCECTOMY FUSION C4-5, C 6-7;  Surgeon: RAFFAELE Burks MD;  Location: [facility] OR;  Service: Orthopedic Spine;  Laterality: N/A;   • BACK SURGERY     • BROW LIFT Bilateral 3/13/2018    Procedure: Direct brow lift;  Surgeon: Jesús Garza MD;  Location: [facility] OR;  Service: ENT   •  SECTION     • COLONOSCOPY  2021    Dr. Miranda, [place], [place]   • HARDWARE REMOVAL N/A 2020    Procedure: REMOVAL OF INSTRUMENTATION;  Surgeon: RAFFAELE Burks MD;  Location: [facility] OR;  Service: Orthopedic Spine;  Laterality: N/A;   • HYSTERECTOMY     • NECK SURGERY     • SHOULDER SURGERY     • SINUS SURGERY     • SINUS SURGERY     • SPINE  SURGERY     • UPPER GASTROINTESTINAL ENDOSCOPY  04/06/2021    Dr. Miranda, [place], [place]   • WRIST SURGERY Right        Family History:  Family History   Problem Relation Age of Onset   • Heart disease Mother         X 2 Stent Placement   • Thyroid disease Mother    • Diverticulitis Mother    • Asthma Mother    • Hypothyroidism Mother    • Heart disease Father         X 5 Stent Placement   • Colon cancer Father    • Prostate cancer Father    • Diverticulitis Father    • Anemia Father    • Heart disease Other    • Polymyalgia rheumatica Other    • Fibromyalgia Other    • Emphysema Other    • Emphysema Other    • Other Other         Colon Problems       Social History:   reports that she quit smoking about 2 years ago. Her smoking use included cigarettes. She has a 24.00 pack-year smoking history. She has never used smokeless tobacco. She reports that she does not drink alcohol and does not use drugs.    Medications:   Prior to Admission medications    Medication Sig Start Date End Date Taking? Authorizing Provider   aspirin 81 MG chewable tablet Chew 81 mg Daily.   Yes Aretha Richmond MD   Cholecalciferol (Vitamin D3) 10 MCG (400 UNIT) capsule Take 1 capsule by mouth Daily.   Yes Aretha Richmond MD   estradiol (ESTRACE) 1 MG tablet TAKE ONE TABLET BY MOUTH DAILY 10/30/19  Yes Francesco Zaidi MD   ezetimibe (ZETIA) 10 MG tablet Take 10 mg by mouth Daily. 4/27/21  Yes Aretha Richmond MD   Ferrous Sulfate Dried (High Potency Iron) 65 MG tablet Take  by mouth.   Yes Aretha Richmond MD   fluorometholone (FML) 0.1 % ophthalmic suspension Administer 0.1 drops to both eyes 2 (Two) Times a Day. 2/14/19  Yes Aretha Richmond MD   gabapentin (NEURONTIN) 800 MG tablet Take 1 tablet by mouth 3 (Three) Times a Day.  Patient taking differently: Take 800 mg by mouth 2 (Two) Times a Day. TAKES 1/2 DOSE IN MORNING AND ONE IN EVENING 4/19/19  Yes Francesco Zaidi MD   HM CETIRIZINE HCL 10 MG tablet Take  10 mg by mouth Daily. 3/21/19  Yes Aretha Richmond MD   HYDROcodone-acetaminophen (NORCO)  MG per tablet Take 1 tablet by mouth Every 4 (Four) Hours As Needed for Severe Pain . 6/23/20  Yes RAFFAELE Burks MD   ipratropium (ATROVENT) 0.03 % nasal spray 2 sprays into the nostril(s) as directed by provider Every 12 (Twelve) Hours.   Yes Aretha Richmond MD   levothyroxine (SYNTHROID, LEVOTHROID) 100 MCG tablet Take 1 tablet by mouth Daily. 4/22/19  Yes Francesco Zaidi MD   montelukast (SINGULAIR) 10 MG tablet Take 10 mg by mouth Daily. 2/13/19  Yes Aretha Richmond MD   Potassium Gluconate  MG tablet controlled-release Take 595 mg by mouth Daily.   Yes Aretha Richmond MD   pregabalin (Lyrica) 150 MG capsule Take 150 mg by mouth 2 (Two) Times a Day.   Yes Aretha Richmond MD   pregabalin (LYRICA) 50 MG capsule Take 50 mg by mouth 3 (Three) Times a Day. 9/11/21  Yes Aretha Richmond MD   zolpidem CR (AMBIEN CR) 12.5 MG CR tablet TAKE ONE TABLET BY MOUTH ONCE DAILY FOR SLEEP (TAKE AT BEDTIME) 8/19/21  Yes Aretha Richmond MD   ZTlido 1.8 % patch APPLY ONE TO THREE PATCHES BY TOPICAL ROUTE ONCE DAILY (MAY WEAR UP TO 12HOURS.) 8/23/21  Yes Aretha Richmond MD   zolpidem (AMBIEN) 5 MG tablet Take 1 tablet by mouth At Night As Needed for Sleep.  Patient taking differently: Take 10 mg by mouth Every Night. 4/19/19 9/15/21 Yes Francesco Zaidi MD   hyoscyamine (Levbid) 0.375 MG 12 hr tablet Take 1 tablet by mouth Every 12 (Twelve) Hours As Needed for Cramping. 4/16/21 9/15/21  Jesús Pisano MD   ibuprofen (ADVIL,MOTRIN) 800 MG tablet Take 1 tablet by mouth Every 6 (Six) Hours As Needed for Mild Pain . 4/16/21 9/15/21  Jesús Pisano MD   lidocaine (LIDODERM) 5 % Place 1 patch on the skin as directed by provider Daily. Remove & Discard patch within 12 hours or as directed by MD  Patient taking differently: Place 1 patch on the skin as directed by provider Daily.  "Remove & Discard patch within 12 hours or as directed by MD . STATES USING LIDODERM CREAM NOW OR MIGHT USE PATCH ON LOWER BACK 4/10/19 9/15/21  Francesco Zaidi MD   meloxicam (MOBIC) 7.5 MG tablet Take 7.5 mg by mouth Daily.  9/15/21  ProviderAretha MD   PREMARIN 0.625 MG tablet Take 1 tablet by mouth Daily. 4/19/19 9/15/21  Francesco Zaidi MD       Allergies:  Patient has no known allergies.    ROS:    Review of Systems   Constitutional: Negative for activity change, appetite change, chills, diaphoresis, fatigue, fever and unexpected weight change.   HENT: Positive for trouble swallowing. Negative for sore throat.    Respiratory: Negative for shortness of breath.    Gastrointestinal: Positive for abdominal pain. Negative for abdominal distention, anal bleeding, blood in stool, constipation, diarrhea, nausea, rectal pain and vomiting.   Endocrine: Negative for polydipsia, polyphagia and polyuria.   Genitourinary: Negative for difficulty urinating.   Musculoskeletal: Negative for arthralgias.   Skin: Negative for pallor.   Allergic/Immunologic: Negative for food allergies.   Neurological: Negative for weakness and light-headedness.   Psychiatric/Behavioral: Negative for behavioral problems.     Objective     Blood pressure 119/61, pulse 65, height 160 cm (63\"), weight 59.4 kg (131 lb), not currently breastfeeding.    Physical Exam  Constitutional:       General: She is not in acute distress.     Appearance: She is well-developed. She is not diaphoretic.   HENT:      Head: Normocephalic and atraumatic.   Cardiovascular:      Rate and Rhythm: Normal rate and regular rhythm.      Heart sounds: Normal heart sounds. No murmur heard.   No friction rub. No gallop.    Pulmonary:      Effort: No respiratory distress.      Breath sounds: Normal breath sounds. No wheezing or rales.   Chest:      Chest wall: No tenderness.   Abdominal:      General: Bowel sounds are normal. There is no distension.      Palpations: " Abdomen is soft. There is no mass.      Tenderness: There is no abdominal tenderness. There is no guarding or rebound.      Hernia: No hernia is present.   Musculoskeletal:         General: Normal range of motion.   Skin:     General: Skin is warm and dry.      Coloration: Skin is not pale.      Findings: No erythema or rash.   Neurological:      Mental Status: She is alert and oriented to person, place, and time.   Psychiatric:         Behavior: Behavior normal.         Thought Content: Thought content normal.         Judgment: Judgment normal.          Assessment/Plan   Diagnoses and all orders for this visit:    1. Epigastric pain (Primary)  -     Case Request; Standing  -     dextrose 5 % and sodium chloride 0.45 % infusion  -     Case Request    Other orders  -     Follow Anesthesia Guidelines / Standing Orders; Future  -     Obtain Informed Consent; Future  -     Implement Anesthesia Orders Day of Procedure; Standing  -     Obtain Informed Consent; Standing  -     POC Glucose Once; Standing  -     Insert Peripheral IV; Standing        ESOPHAGOGASTRODUODENOSCOPY (N/A)     Diagnosis Plan   1. Epigastric pain  Case Request    dextrose 5 % and sodium chloride 0.45 % infusion    Case Request       Anticipated Surgical Procedure:  Orders Placed This Encounter   Procedures   • Follow Anesthesia Guidelines / Standing Orders     Standing Status:   Future   • Obtain Informed Consent     Standing Status:   Future     Order Specific Question:   Informed Consent Given For     Answer:   ESOPHAGOGASTRODUODENOSCOPY       The risks, benefits, and alternatives of this procedure have been discussed with the patient or the responsible party- the patient understands and agrees to proceed.

## 2021-09-28 ENCOUNTER — LAB (OUTPATIENT)
Dept: LAB | Facility: HOSPITAL | Age: 66
End: 2021-09-28

## 2021-09-28 DIAGNOSIS — Z01.818 PREOP TESTING: Primary | ICD-10-CM

## 2021-09-28 LAB — SARS-COV-2 N GENE RESP QL NAA+PROBE: NOT DETECTED

## 2021-09-28 PROCEDURE — 87635 SARS-COV-2 COVID-19 AMP PRB: CPT

## 2021-09-28 PROCEDURE — C9803 HOPD COVID-19 SPEC COLLECT: HCPCS

## 2021-09-30 RX ORDER — GARLIC 180 MG
TABLET, DELAYED RELEASE (ENTERIC COATED) ORAL DAILY
COMMUNITY

## 2021-09-30 RX ORDER — VITAMIN B COMPLEX
CAPSULE ORAL DAILY
COMMUNITY

## 2021-09-30 RX ORDER — LEVOTHYROXINE SODIUM 112 UG/1
112 TABLET ORAL DAILY
COMMUNITY

## 2021-09-30 RX ORDER — MELOXICAM 7.5 MG/1
7.5 TABLET ORAL DAILY
COMMUNITY

## 2021-10-01 ENCOUNTER — ANESTHESIA EVENT (OUTPATIENT)
Dept: GASTROENTEROLOGY | Facility: HOSPITAL | Age: 66
End: 2021-10-01

## 2021-10-01 ENCOUNTER — ANESTHESIA (OUTPATIENT)
Dept: GASTROENTEROLOGY | Facility: HOSPITAL | Age: 66
End: 2021-10-01

## 2021-10-01 ENCOUNTER — HOSPITAL ENCOUNTER (OUTPATIENT)
Facility: HOSPITAL | Age: 66
Setting detail: HOSPITAL OUTPATIENT SURGERY
Discharge: HOME | End: 2021-10-01
Attending: INTERNAL MEDICINE | Admitting: INTERNAL MEDICINE

## 2021-10-01 VITALS
BODY MASS INDEX: 23.39 KG/M2 | HEIGHT: 63 IN | RESPIRATION RATE: 18 BRPM | OXYGEN SATURATION: 97 % | HEART RATE: 60 BPM | DIASTOLIC BLOOD PRESSURE: 51 MMHG | WEIGHT: 132 LBS | SYSTOLIC BLOOD PRESSURE: 103 MMHG | TEMPERATURE: 96.8 F

## 2021-10-01 DIAGNOSIS — R10.13 EPIGASTRIC PAIN: ICD-10-CM

## 2021-10-01 PROCEDURE — C1769 GUIDE WIRE: HCPCS | Performed by: INTERNAL MEDICINE

## 2021-10-01 PROCEDURE — 88305 TISSUE EXAM BY PATHOLOGIST: CPT

## 2021-10-01 PROCEDURE — 25010000002 PROPOFOL 10 MG/ML EMULSION: Performed by: NURSE ANESTHETIST, CERTIFIED REGISTERED

## 2021-10-01 PROCEDURE — 43248 EGD GUIDE WIRE INSERTION: CPT | Performed by: INTERNAL MEDICINE

## 2021-10-01 PROCEDURE — 43239 EGD BIOPSY SINGLE/MULTIPLE: CPT | Performed by: INTERNAL MEDICINE

## 2021-10-01 RX ORDER — DEXTROSE AND SODIUM CHLORIDE 5; .45 G/100ML; G/100ML
30 INJECTION, SOLUTION INTRAVENOUS CONTINUOUS PRN
Status: DISCONTINUED | OUTPATIENT
Start: 2021-10-01 | End: 2021-10-01 | Stop reason: HOSPADM

## 2021-10-01 RX ORDER — ONDANSETRON 2 MG/ML
4 INJECTION INTRAMUSCULAR; INTRAVENOUS ONCE AS NEEDED
Status: DISCONTINUED | OUTPATIENT
Start: 2021-10-01 | End: 2021-10-01 | Stop reason: HOSPADM

## 2021-10-01 RX ORDER — LIDOCAINE HYDROCHLORIDE 20 MG/ML
INJECTION, SOLUTION INTRAVENOUS AS NEEDED
Status: DISCONTINUED | OUTPATIENT
Start: 2021-10-01 | End: 2021-10-01 | Stop reason: SURG

## 2021-10-01 RX ORDER — PROPOFOL 10 MG/ML
VIAL (ML) INTRAVENOUS AS NEEDED
Status: DISCONTINUED | OUTPATIENT
Start: 2021-10-01 | End: 2021-10-01 | Stop reason: SURG

## 2021-10-01 RX ADMIN — DEXTROSE AND SODIUM CHLORIDE 30 ML/HR: 5; 450 INJECTION, SOLUTION INTRAVENOUS at 07:56

## 2021-10-01 RX ADMIN — PROPOFOL 20 MG: 10 INJECTION, EMULSION INTRAVENOUS at 08:32

## 2021-10-01 RX ADMIN — PROPOFOL 70 MG: 10 INJECTION, EMULSION INTRAVENOUS at 08:30

## 2021-10-01 RX ADMIN — PROPOFOL 20 MG: 10 INJECTION, EMULSION INTRAVENOUS at 08:36

## 2021-10-01 RX ADMIN — LIDOCAINE HYDROCHLORIDE 50 MG: 20 INJECTION, SOLUTION INTRAVENOUS at 08:30

## 2021-10-01 RX ADMIN — PROPOFOL 20 MG: 10 INJECTION, EMULSION INTRAVENOUS at 08:34

## 2021-10-01 NOTE — ANESTHESIA POSTPROCEDURE EVALUATION
Patient: Penny Gtz    Procedure Summary     Date: 10/01/21 Room / Location: [facility] ENDOSCOPY 3 / [facility] ENDOSCOPY    Anesthesia Start: 0825 Anesthesia Stop: 0838    Procedure: ESOPHAGOGASTRODUODENOSCOPY (N/A ) Diagnosis:       Epigastric pain      (Epigastric pain [R10.13])    Surgeons: Jesús Pisano MD Provider: Seth Mason CRNA    Anesthesia Type: MAC ASA Status: 3          Anesthesia Type: MAC    Vitals  No vitals data found for the desired time range.          Post Anesthesia Care and Evaluation    Patient location during evaluation: PHASE II  Level of consciousness: sleepy but conscious  Pain score: 0  Pain management: adequate  Airway patency: patent  Anesthetic complications: No anesthetic complications  PONV Status: none  Cardiovascular status: acceptable and hemodynamically stable  Respiratory status: acceptable and spontaneous ventilation  Hydration status: acceptable

## 2021-10-01 NOTE — ANESTHESIA PREPROCEDURE EVALUATION
Anesthesia Evaluation     Patient summary reviewed   no history of anesthetic complications:  NPO Solid Status: > 8 hours  NPO Liquid Status: > 8 hours           Airway   Mallampati: I  TM distance: >3 FB  Neck ROM: limited  No difficulty expected  Dental    (+) lower dentures and upper dentures    Pulmonary    (+) a smoker (stopped cigs 02/2019, now uses jewl) Former, sleep apnea (does not use cpap),   (-) COPD (listed on chart, patient denies), asthma  Cardiovascular   Exercise tolerance: good (4-7 METS)    ECG reviewed    (+) PVD (mesenteric artery stenosis), hyperlipidemia,  carotid artery disease carotid bilateral  (-) hypertension, past MI, CAD, dysrhythmias, CHF, cardiac stents      Neuro/Psych  (+) weakness (left upper extremity), numbness (left upper extremity),     (-) seizures, TIA, CVA  GI/Hepatic/Renal/Endo    (+)  GERD,  hepatitis (carrier) B, renal disease CRI, thyroid problem hypothyroidism  (-) liver disease    Musculoskeletal     (+) chronic pain, neck pain,   Abdominal    Substance History      OB/GYN          Other   arthritis,                        Anesthesia Plan    ASA 3     MAC   total IV anesthesia  intravenous induction     Anesthetic plan, all risks, benefits, and alternatives have been provided, discussed and informed consent has been obtained with: patient.

## 2021-10-05 LAB
LAB AP CASE REPORT: NORMAL
PATH REPORT.FINAL DX SPEC: NORMAL

## 2021-10-07 ENCOUNTER — OFFICE VISIT (OUTPATIENT)
Dept: GASTROENTEROLOGY | Facility: CLINIC | Age: 66
End: 2021-10-07

## 2021-10-07 VITALS
WEIGHT: 132.6 LBS | DIASTOLIC BLOOD PRESSURE: 70 MMHG | HEART RATE: 65 BPM | SYSTOLIC BLOOD PRESSURE: 120 MMHG | HEIGHT: 63 IN | BODY MASS INDEX: 23.5 KG/M2

## 2021-10-07 DIAGNOSIS — K22.2 STRICTURE AND STENOSIS OF ESOPHAGUS: ICD-10-CM

## 2021-10-07 DIAGNOSIS — K21.00 GASTROESOPHAGEAL REFLUX DISEASE WITH ESOPHAGITIS WITHOUT HEMORRHAGE: Primary | ICD-10-CM

## 2021-10-07 PROCEDURE — 99213 OFFICE O/P EST LOW 20 MIN: CPT | Performed by: NURSE PRACTITIONER

## 2021-10-07 RX ORDER — SUCRALFATE 1 G/1
1 TABLET ORAL 4 TIMES DAILY
Qty: 120 TABLET | Refills: 2 | Status: SHIPPED | OUTPATIENT
Start: 2021-10-07

## 2021-10-07 NOTE — PROGRESS NOTES
Chief Complaint   Patient presents with   • Abdominal Pain       Subjective    Penny Gtz is a 66 y.o. female. she is here today for follow-up.    66-year-old female presents for 2-week recheck regarding persistent epigastric pain.  She reports pain is a 10 but she is resting comfortably in chair in no acute distress upon exam.  She had ultrasound, gastric emptying study, CTA,  CT abdomen pelvis and EGD which was noted no acute abnormality.  EGD noted benign-appearing stenosis dilated 54 Citizen of [place] probably severe esophagitis gastritis normal duodenum.  Antral biopsy noted reactive gastropathy.  Esophageal biopsy noted reactive squamous mucosa.  Small bowel biopsy noted no significant histologic abnormality.  States since dilation she did follow much better but has noted more reflux symptoms recently.  Reports she has also had issues with constipation has been taking MiraLAX and probiotic with some improvement although she feels like MiraLAX turns her stool too loose and she cannot predict when she will have to go.  Feels like symptoms are worsening because she has to sit a lot at her job.    Abdominal Pain  Associated symptoms include nausea. Pertinent negatives include no anorexia, arthralgias, constipation, diarrhea, fever, flatus, hematochezia or vomiting.   Constipation  This is a chronic problem. The current episode started more than 1 year ago. The problem has been gradually worsening since onset. Her stool frequency is 2 to 3 times per week. The stool is described as formed. The patient is on a high fiber diet. She exercises regularly. There has been adequate water intake. Associated symptoms include abdominal pain (constant burning and pain ), back pain, bloating and nausea. Pertinent negatives include no anorexia, diarrhea, difficulty urinating, fecal incontinence, fever, flatus, hematochezia, hemorrhoids, rectal pain or vomiting. She has tried diet changes, laxatives, fiber, stool softeners and manual  disimpaction for the symptoms. The treatment provided moderate relief.            The following portions of the patient's history were reviewed and updated as appropriate:   Past Medical History:   Diagnosis Date   • Arthritis    • Brow ptosis 2018   • GERD (gastroesophageal reflux disease)    • Headache    • Hepatitis B carrier (HCC)    • Hiatal hernia    • History of transfusion    • Hyperlipidemia    • Hypothyroidism    • Irritable bowel disease    • Migraines    • Nerve damage     legs bilateral   • Sleep apnea     UNABLE TO WEAR C PAP   • Visual field defect    • Vitiligo      Past Surgical History:   Procedure Laterality Date   • ANTERIOR CERVICAL DISCECTOMY W/ FUSION N/A 2020    Procedure: EXPLORATION OF FUSION, C 5-6, ANTERIOR CERVICAL DISCECTOMY FUSION C4-5, C 6-7;  Surgeon: RAFFAELE Burks MD;  Location: [facility] OR;  Service: Orthopedic Spine;  Laterality: N/A;   • BACK SURGERY       x 3   • BROW LIFT Bilateral 3/13/2018    Procedure: Direct brow lift;  Surgeon: Jesús Garza MD;  Location: [facility] OR;  Service: ENT   •  SECTION      x3   • COLONOSCOPY  2021    Dr. Miranda [place], [place]   • ENDOSCOPY N/A 10/1/2021    Procedure: ESOPHAGOGASTRODUODENOSCOPY;  Surgeon: Jesús Pisano MD;  Location: [facility] ENDOSCOPY;  Service: Gastroenterology;  Laterality: N/A;   • HARDWARE REMOVAL N/A 2020    Procedure: REMOVAL OF INSTRUMENTATION;  Surgeon: RAFFAELE Burks MD;  Location: [facility] OR;  Service: Orthopedic Spine;  Laterality: N/A;   • HYSTERECTOMY     • SHOULDER ARTHROSCOPY Right    • SHOULDER SURGERY Left     scope   • SINUS SURGERY     • UPPER GASTROINTESTINAL ENDOSCOPY  2021    Dr. Miranda [place], [place]   • WRIST SURGERY Right      Family History   Problem Relation Age of Onset   • Heart disease Mother         X 2 Stent Placement   • Thyroid disease Mother    • Diverticulitis Mother    • Asthma Mother    • Hypothyroidism Mother    • Heart disease Father          X 5 Stent Placement   • Colon cancer Father    • Prostate cancer Father    • Diverticulitis Father    • Anemia Father    • Heart disease Other    • Polymyalgia rheumatica Other    • Fibromyalgia Other    • Emphysema Other    • Emphysema Other    • Other Other         Colon Problems     OB History    No obstetric history on file.       Prior to Admission medications    Medication Sig Start Date End Date Taking? Authorizing Provider   aspirin 81 MG chewable tablet Chew 81 mg Daily.   Yes Aretha Richmond MD   B Complex Vitamins (vitamin b complex) capsule capsule Take  by mouth Daily.   Yes Aretha Richmond MD   Cholecalciferol (Vitamin D3) 10 MCG (400 UNIT) capsule Take 1 capsule by mouth Daily.   Yes Aretha Richmond MD   ezetimibe (ZETIA) 10 MG tablet Take 10 mg by mouth Daily. 4/27/21  Yes Aretha Richmond MD   Ferrous Sulfate Dried (High Potency Iron) 65 MG tablet Take  by mouth Daily.   Yes Aretha Richmond MD   fluorometholone (FML) 0.1 % ophthalmic suspension Administer 1 drop to both eyes 2 (Two) Times a Day. 2/14/19  Yes Aretha Richmond MD   Ginkgo Biloba 60 MG capsule Take  by mouth Daily.   Yes Provider, MD Aretha    CETIRIZINE HCL 10 MG tablet Take 10 mg by mouth Daily. 3/21/19  Yes Aretha Richmond MD   HYDROcodone-acetaminophen (NORCO)  MG per tablet Take 1 tablet by mouth Every 4 (Four) Hours As Needed for Severe Pain . 6/23/20  Yes RAFFAELE Burks MD   ipratropium (ATROVENT) 0.03 % nasal spray 2 sprays into the nostril(s) as directed by provider Every 12 (Twelve) Hours.   Yes Aretha Richmond MD   levothyroxine (SYNTHROID, LEVOTHROID) 112 MCG tablet Take 112 mcg by mouth Daily.   Yes Aretha Richmond MD   meloxicam (MOBIC) 7.5 MG tablet Take 7.5 mg by mouth Daily.   Yes Aretha Richmond MD   montelukast (SINGULAIR) 10 MG tablet Take 10 mg by mouth Daily. 2/13/19  Yes Aretha Richmond MD   Nutritional Supplements (ESTROVEN PO)  Take  by mouth Daily.   Yes Aretha Richmond MD   Potassium Gluconate  MG tablet controlled-release Take 595 mg by mouth Daily.   Yes Aretha Richmond MD   pregabalin (Lyrica) 100 MG capsule Take 150 mg by mouth Every Night.   Yes Aretha Richmond MD   pregabalin (LYRICA) 50 MG capsule Take 50 mg by mouth Every Night. 21  Yes Aretha Richmond MD   TURMERIC CURCUMIN PO Take  by mouth Daily.   Yes Aretha Richmond MD   zolpidem CR (AMBIEN CR) 12.5 MG CR tablet Take 12.5 mg by mouth At Night As Needed. 21  Yes Aretha Richmond MD   ZTlido 1.8 % patch Place  on the skin as directed by provider As Needed. 21  Yes Aretha Richmond MD     No Known Allergies  Social History     Socioeconomic History   • Marital status:      Spouse name: Not on file   • Number of children: Not on file   • Years of education: Not on file   • Highest education level: Not on file   Tobacco Use   • Smoking status: Former Smoker     Packs/day: 0.50     Years: 48.00     Pack years: 24.00     Types: Cigarettes     Quit date: 2019     Years since quittin.6   • Smokeless tobacco: Never Used   Vaping Use   • Vaping Use: Every day   • Substances: Nicotine (5%), Flavoring   • Devices: Disposable   Substance and Sexual Activity   • Alcohol use: No   • Drug use: Never   • Sexual activity: Defer       Review of Systems  Review of Systems   Constitutional: Positive for fatigue. Negative for activity change, appetite change, chills, diaphoresis, fever and unexpected weight change.   HENT: Negative for sore throat and trouble swallowing.    Respiratory: Negative for shortness of breath.    Gastrointestinal: Positive for abdominal pain (constant burning and pain ), bloating and nausea. Negative for abdominal distention, anal bleeding, anorexia, blood in stool, constipation, diarrhea, flatus, hematochezia, hemorrhoids, rectal pain and vomiting.   Genitourinary: Negative for difficulty  "urinating.   Musculoskeletal: Positive for back pain. Negative for arthralgias.   Skin: Negative for pallor.   Neurological: Negative for light-headedness.        /70   Pulse 65   Ht 160 cm (63\")   Wt 60.1 kg (132 lb 9.6 oz)   BMI 23.49 kg/m²     Objective    Physical Exam  Constitutional:       General: She is not in acute distress.     Appearance: Normal appearance. She is normal weight. She is not ill-appearing.   HENT:      Head: Normocephalic and atraumatic.   Pulmonary:      Effort: Pulmonary effort is normal.   Abdominal:      General: Bowel sounds are normal. There is no distension.      Palpations: Abdomen is soft.      Tenderness: There is abdominal tenderness in the right lower quadrant, epigastric area, periumbilical area, suprapubic area and left lower quadrant.   Neurological:      Mental Status: She is alert.       Admission on 10/01/2021, Discharged on 10/01/2021   Component Date Value Ref Range Status   • Case Report 10/01/2021    Final                    Value:Surgical Pathology Report                         Case: [id number]                                  Authorizing Provider:  Jesús Pisano MD        Collected:           10/01/2021 08:38 AM          Ordering Location:     [facility]   Received:            10/01/2021 10:17 AM                                 [facility]                                                     Pathologist:           Krish Alegria MD                                                           Specimens:   1) - Small Intestine, Duodenum, small bowel  cold bx                                                2) - Gastric, Antrum, antrum   cold bx                                                              3) - Esophagus, Distal, distal esophagus  cold bx                                         • Final Diagnosis 10/01/2021    Final                    Value:This result contains rich text formatting which cannot be displayed here. "     Assessment/Plan      1. Gastroesophageal reflux disease with esophagitis without hemorrhage    2. Stricture and stenosis of esophagus    .   We will add Carafate before meals and bedtime recommend she start 2 times daily increased to 4 times per day as tolerated as long as it does not worsen constipation.  Continue MiraLAX try it every other day if daily is causing too much loose stool.  Recommend she add Benefiber daily to help bulk stool and increase dietary fiber.  Follow-up in 1 month for recheck return office sooner.    Orders placed during this encounter include:  No orders of the defined types were placed in this encounter.      * Surgery not found *    Review and/or summary of lab tests, radiology, procedures, medications. Review and summary of old records and obtaining of history. The risks and benefits of my recommendations, as well as other treatment options were discussed with the patient today. Questions were answered.    New Medications Ordered This Visit   Medications   • sucralfate (Carafate) 1 g tablet     Sig: Take 1 tablet by mouth 4 (Four) Times a Day.     Dispense:  120 tablet     Refill:  2       Follow-up: Return in about 4 weeks (around 11/4/2021) for Recheck Norris .          This document has been electronically signed by EVAN Tejeda on October 7, 2021 13:03 CDT           I spent 17 minutes caring for Penny on this date of service. This time includes time spent by me in the following activities:preparing for the visit, reviewing tests, obtaining and/or reviewing a separately obtained history, performing a medically appropriate examination and/or evaluation , counseling and educating the patient/family/caregiver, ordering medications, tests, or procedures, referring and communicating with other health care professionals , documenting information in the medical record and care coordination    Results for orders placed or performed during the hospital encounter of 10/01/21    Tissue Pathology Exam    Specimen: A: Small Intestine, Duodenum; Tissue    B: Gastric, Antrum; Tissue    C: Esophagus, Distal; Tissue   Result Value Ref Range    Case Report       Surgical Pathology Report                         Case: [id number]                                  Authorizing Provider:  Jesús Pisano MD        Collected:           10/01/2021 08:38 AM          Ordering Location:     [facility]   Received:            10/01/2021 10:17 AM                                 [facility]                                                     Pathologist:           Krish Alegria MD                                                           Specimens:   1) - Small Intestine, Duodenum, small bowel  cold bx                                                2) - Gastric, Antrum, antrum   cold bx                                                              3) - Esophagus, Distal, distal esophagus  cold bx                                          Final Diagnosis       SEE SCANNED REPORT       Results for orders placed or performed in visit on 09/28/21   COVID-19, [facility] IN-HOUSE, NP SWAB IN TRANSPORT MEDIA 8-10 HR TAT - Swab, Nasopharynx    Specimen: Nasopharynx; Swab   Result Value Ref Range    COVID19 Not Detected Not Detected - Ref. Range   Results for orders placed or performed in visit on 06/30/21   Gold Top - SST   Result Value Ref Range    Extra Tube Hold for add-ons.    Lavender Top   Result Value Ref Range    Extra Tube hold for add-on    BUN    Specimen: Blood   Result Value Ref Range    BUN 17 8 - 23 mg/dL   Creatinine, Serum    Specimen: Blood   Result Value Ref Range    Creatinine 0.81 0.57 - 1.00 mg/dL    eGFR Non African Amer 71 >60 mL/min/1.73   Results for orders placed or performed during the hospital encounter of 04/16/21   Gold Top - SST   Result Value Ref Range    Extra Tube Hold for add-ons.    Urinalysis With Microscopic If Indicated (No Culture) - Urine, Clean  Catch    Specimen: Urine, Clean Catch   Result Value Ref Range    Color, UA Yellow Yellow, Straw, Dark Yellow, Luisa    Appearance, UA Clear Clear    pH, UA 6.0 5.0 - 9.0    Specific Gravity, UA 1.010 1.003 - 1.030    Glucose, UA Negative Negative    Ketones, UA Negative Negative    Bilirubin, UA Negative Negative    Blood, UA Negative Negative    Protein, UA Negative Negative    Leuk Esterase, UA Negative Negative    Nitrite, UA Negative Negative    Urobilinogen, UA 0.2 E.U./dL 0.2 - 1.0 E.U./dL   CBC Auto Differential    Specimen: Blood   Result Value Ref Range    WBC 7.93 3.40 - 10.80 10*3/mm3    RBC 3.67 (L) 3.77 - 5.28 10*6/mm3    Hemoglobin 11.1 (L) 12.0 - 15.9 g/dL    Hematocrit 34.2 34.0 - 46.6 %    MCV 93.2 79.0 - 97.0 fL    MCH 30.2 26.6 - 33.0 pg    MCHC 32.5 31.5 - 35.7 g/dL    RDW 12.4 12.3 - 15.4 %    RDW-SD 42.3 37.0 - 54.0 fl    MPV 11.3 6.0 - 12.0 fL    Platelets 202 140 - 450 10*3/mm3    Neutrophil % 70.6 42.7 - 76.0 %    Lymphocyte % 19.4 (L) 19.6 - 45.3 %    Monocyte % 7.4 5.0 - 12.0 %    Eosinophil % 1.5 0.3 - 6.2 %    Basophil % 0.8 0.0 - 1.5 %    Immature Grans % 0.3 0.0 - 0.5 %    Neutrophils, Absolute 5.60 1.70 - 7.00 10*3/mm3    Lymphocytes, Absolute 1.54 0.70 - 3.10 10*3/mm3    Monocytes, Absolute 0.59 0.10 - 0.90 10*3/mm3    Eosinophils, Absolute 0.12 0.00 - 0.40 10*3/mm3    Basophils, Absolute 0.06 0.00 - 0.20 10*3/mm3    Immature Grans, Absolute 0.02 0.00 - 0.05 10*3/mm3    nRBC 0.0 0.0 - 0.2 /100 WBC   Light Blue Top   Result Value Ref Range    Extra Tube hold for add-on    Lipase    Specimen: Blood   Result Value Ref Range    Lipase 35 13 - 60 U/L   Comprehensive Metabolic Panel    Specimen: Blood   Result Value Ref Range    Glucose 116 (H) 65 - 99 mg/dL    BUN 13 8 - 23 mg/dL    Creatinine 0.75 0.57 - 1.00 mg/dL    Sodium 146 (H) 136 - 145 mmol/L    Potassium 4.0 3.5 - 5.2 mmol/L    Chloride 110 (H) 98 - 107 mmol/L    CO2 27.0 22.0 - 29.0 mmol/L    Calcium 9.3 8.6 - 10.5 mg/dL     Total Protein 6.4 6.0 - 8.5 g/dL    Albumin 4.00 3.50 - 5.20 g/dL    ALT (SGPT) 20 1 - 33 U/L    AST (SGOT) 22 1 - 32 U/L    Alkaline Phosphatase 108 39 - 117 U/L    Total Bilirubin 0.7 0.0 - 1.2 mg/dL    eGFR Non African Amer 77 >60 mL/min/1.73    Globulin 2.4 gm/dL    A/G Ratio 1.7 g/dL    BUN/Creatinine Ratio 17.3 7.0 - 25.0    Anion Gap 9.0 5.0 - 15.0 mmol/L   ECG 12 Lead   Result Value Ref Range    QT Interval 424 ms    QTC Interval 416 ms   Results for orders placed or performed during the hospital encounter of 06/22/20   COVID-19 [facility] IN-HOUSE, Nasal Swab No Transport Media - Swab, Nasal Cavity    Specimen: Nasal Cavity; Swab   Result Value Ref Range    COVID19 Not Detected Not Detected - Ref. Range   CBC Auto Differential    Specimen: Blood   Result Value Ref Range    WBC 11.12 (H) 3.40 - 10.80 10*3/mm3    RBC 3.16 (L) 3.77 - 5.28 10*6/mm3    Hemoglobin 10.0 (L) 12.0 - 15.9 g/dL    Hematocrit 29.4 (L) 34.0 - 46.6 %    MCV 93.0 79.0 - 97.0 fL    MCH 31.6 26.6 - 33.0 pg    MCHC 34.0 31.5 - 35.7 g/dL    RDW 13.0 12.3 - 15.4 %    RDW-SD 44.7 37.0 - 54.0 fl    MPV 11.8 6.0 - 12.0 fL    Platelets 221 140 - 450 10*3/mm3    Neutrophil % 78.7 (H) 42.7 - 76.0 %    Lymphocyte % 12.8 (L) 19.6 - 45.3 %    Monocyte % 8.1 5.0 - 12.0 %    Eosinophil % 0.0 (L) 0.3 - 6.2 %    Basophil % 0.1 0.0 - 1.5 %    Immature Grans % 0.3 0.0 - 0.5 %    Neutrophils, Absolute 8.76 (H) 1.70 - 7.00 10*3/mm3    Lymphocytes, Absolute 1.42 0.70 - 3.10 10*3/mm3    Monocytes, Absolute 0.90 0.10 - 0.90 10*3/mm3    Eosinophils, Absolute 0.00 0.00 - 0.40 10*3/mm3    Basophils, Absolute 0.01 0.00 - 0.20 10*3/mm3    Immature Grans, Absolute 0.03 0.00 - 0.05 10*3/mm3    nRBC 0.0 0.0 - 0.2 /100 WBC   Type & Screen    Specimen: Blood   Result Value Ref Range    ABO Type A     RH type Positive     Antibody Screen Negative     T&S Expiration Date 6/25/2020 11:59:59 PM    Basic Metabolic Panel    Specimen: Blood   Result Value Ref Range    Glucose 120  (H) 65 - 99 mg/dL    BUN 9 8 - 23 mg/dL    Creatinine 0.70 0.57 - 1.00 mg/dL    Sodium 139 136 - 145 mmol/L    Potassium 4.0 3.5 - 5.2 mmol/L    Chloride 104 98 - 107 mmol/L    CO2 23.0 22.0 - 29.0 mmol/L    Calcium 8.5 (L) 8.6 - 10.5 mg/dL    eGFR Non African Amer 84 >60 mL/min/1.73    BUN/Creatinine Ratio 12.9 7.0 - 25.0    Anion Gap 12.0 5.0 - 15.0 mmol/L     *Note: Due to a large number of results and/or encounters for the requested time period, some results have not been displayed. A complete set of results can be found in Results Review.

## 2021-10-07 NOTE — PATIENT INSTRUCTIONS
MyPlate from USDA    MyPlate is an outline of a general healthy diet based on the 2010 Dietary Guidelines for Americans, from the U.S. Department of Agriculture (USDA). It sets guidelines for how much food you should eat from each food group based on your age, sex, and level of physical activity.  What are tips for following MyPlate?  To follow MyPlate recommendations:  · Eat a wide variety of fruits and vegetables, grains, and protein foods.  · Serve smaller portions and eat less food throughout the day.  · Limit portion sizes to avoid overeating.  · Enjoy your food.  · Get at least 150 minutes of exercise every week. This is about 30 minutes each day, 5 or more days per week.  It can be difficult to have every meal look like MyPlate. Think about MyPlate as eating guidelines for an entire day, rather than each individual meal.  Fruits and vegetables  · Make half of your plate fruits and vegetables.  · Eat many different colors of fruits and vegetables each day.  · For a 2,000 calorie daily food plan, eat:  ? 2½ cups of vegetables every day.  ? 2 cups of fruit every day.  · 1 cup is equal to:  ? 1 cup raw or cooked vegetables.  ? 1 cup raw fruit.  ? 1 medium-sized orange, apple, or banana.  ? 1 cup 100% fruit or vegetable juice.  ? 2 cups raw leafy greens, such as lettuce, spinach, or kale.  ? ½ cup dried fruit.  Grains  · One fourth of your plate should be grains.  · Make at least half of the grains you eat each day whole grains.  · For a 2,000 calorie daily food plan, eat 6 oz of grains every day.  · 1 oz is equal to:  ? 1 slice bread.  ? 1 cup cereal.  ? ½ cup cooked rice, cereal, or pasta.  Protein  · One fourth of your plate should be protein.  · Eat a wide variety of protein foods, including meat, poultry, fish, eggs, beans, nuts, and tofu.  · For a 2,000 calorie daily food plan, eat 5½ oz of protein every day.  · 1 oz is equal to:  ? 1 oz meat, poultry, or fish.  ? ¼ cup cooked beans.  ? 1 egg.  ? ½ oz nuts  or seeds.  ? 1 Tbsp peanut butter.  Dairy  · Drink fat-free or low-fat (1%) milk.  · Eat or drink dairy as a side to meals.  · For a 2,000 calorie daily food plan, eat or drink 3 cups of dairy every day.  · 1 cup is equal to:  ? 1 cup milk, yogurt, cottage cheese, or soy milk (soy beverage).  ? 2 oz processed cheese.  ? 1½ oz natural cheese.  Fats, oils, salt, and sugars  · Only small amounts of oils are recommended.  · Avoid foods that are high in calories and low in nutritional value (empty calories), like foods high in fat or added sugars.  · Choose foods that are low in salt (sodium). Choose foods that have less than 140 milligrams (mg) of sodium per serving.  · Drink water instead of sugary drinks. Drink enough water each day to keep your urine pale yellow.  Where to find support  · Work with your health care provider or a nutrition specialist (dietitian) to develop a customized eating plan that is right for you.  · Download an christi (mobile application) to help you track your daily food intake.  Where to find more information  · Go to ChooseMyPlate.gov for more information.  Summary  · MyPlate is a general guideline for healthy eating from the USDA. It is based on the 2010 Dietary Guidelines for Americans.  · In general, fruits and vegetables should take up ½ of your plate, grains should take up ¼ of your plate, and protein should take up ¼ of your plate.  This information is not intended to replace advice given to you by your health care provider. Make sure you discuss any questions you have with your health care provider.  Document Revised: 05/21/2020 Document Reviewed: 03/19/2018  Elsevier Patient Education © 2021 Elsevier [employer].  BMI for Adults  What is BMI?  Body mass index (BMI) is a number that is calculated from a person's weight and height. BMI can help estimate how much of a person's weight is composed of fat. BMI does not measure body fat directly. Rather, it is an alternative to procedures that  "directly measure body fat, which can be difficult and expensive.  BMI can help identify people who may be at higher risk for certain medical problems.  What are BMI measurements used for?  BMI is used as a screening tool to identify possible weight problems. It helps determine whether a person is obese, overweight, a healthy weight, or underweight.  BMI is useful for:  · Identifying a weight problem that may be related to a medical condition or may increase the risk for medical problems.  · Promoting changes, such as changes in diet and exercise, to help reach a healthy weight. BMI screening can be repeated to see if these changes are working.  How is BMI calculated?  BMI involves measuring your weight in relation to your height. Both height and weight are measured, and the BMI is calculated from those numbers. This can be done either in English (U[place] or metric measurements. Note that charts and online BMI calculators are available to help you find your BMI quickly and easily without having to do these calculations yourself.  To calculate your BMI in English (U.S[place] measurements:    1. Measure your weight in pounds (lb).  2. Multiply the number of pounds by 703.  ? For example, for a person who weighs 180 lb, multiply that number by 703, which equals 126,540.  3. Measure your height in inches. Then multiply that number by itself to get a measurement called \"inches squared.\"  ? For example, for a person who is 70 inches tall, the \"inches squared\" measurement is 70 inches x 70 inches, which equals 4,900 inches squared.  4. Divide the total from step 2 (number of lb x 703) by the total from step 3 (inches squared): 126,540 ÷ 4,900 = 25.8. This is your BMI.    To calculate your BMI in metric measurements:  1. Measure your weight in kilograms (kg).  2. Measure your height in meters (m). Then multiply that number by itself to get a measurement called \"meters squared.\"  ? For example, for a person who is 1.75 m tall, the " "\"meters squared\" measurement is 1.75 m x 1.75 m, which is equal to 3.1 meters squared.  3. Divide the number of kilograms (your weight) by the meters squared number. In this example: 70 ÷ 3.1 = 22.6. This is your BMI.  What do the results mean?  BMI charts are used to identify whether you are underweight, normal weight, overweight, or obese. The following guidelines will be used:  · Underweight: BMI less than 18.5.  · Normal weight: BMI between 18.5 and 24.9.  · Overweight: BMI between 25 and 29.9.  · Obese: BMI of 30 or above.  Keep these notes in mind:  · Weight includes both fat and muscle, so someone with a muscular build, such as an athlete, may have a BMI that is higher than 24.9. In cases like these, BMI is not an accurate measure of body fat.  · To determine if excess body fat is the cause of a BMI of 25 or higher, further assessments may need to be done by a health care provider.  · BMI is usually interpreted in the same way for men and women.  Where to find more information  For more information about BMI, including tools to quickly calculate your BMI, go to these websites:  · Centers for Disease Control and Prevention: www.cdc.gov  · American Heart Association: www.heart.org  · National Heart, Lung, and Blood [place]: www.nhlbi.nih.gov  Summary  · Body mass index (BMI) is a number that is calculated from a person's weight and height.  · BMI may help estimate how much of a person's weight is composed of fat. BMI can help identify those who may be at higher risk for certain medical problems.  · BMI can be measured using English measurements or metric measurements.  · BMI charts are used to identify whether you are underweight, normal weight, overweight, or obese.  This information is not intended to replace advice given to you by your health care provider. Make sure you discuss any questions you have with your health care provider.  Document Revised: 09/09/2020 Document Reviewed: 07/17/2020  Clara " Patient Education © 2021 Elsevier Inc.

## 2022-05-03 RX ORDER — SUCRALFATE 1 G/1
TABLET ORAL
Qty: 120 TABLET | Refills: 2 | OUTPATIENT
Start: 2022-05-03

## 2025-04-11 ENCOUNTER — TELEPHONE (OUTPATIENT)
Age: 70
End: 2025-04-11

## 2025-04-11 NOTE — TELEPHONE ENCOUNTER
Patient is wanting to get scheduled with Dr Doan and had OP notes sent in last month and would like an update on her approval

## 2025-05-15 ENCOUNTER — OFFICE VISIT (OUTPATIENT)
Age: 70
End: 2025-05-15
Payer: MEDICARE

## 2025-05-15 VITALS — WEIGHT: 160 LBS | BODY MASS INDEX: 28.35 KG/M2 | HEIGHT: 63 IN

## 2025-05-15 DIAGNOSIS — M25.562 CHRONIC PAIN OF LEFT KNEE: ICD-10-CM

## 2025-05-15 DIAGNOSIS — G89.29 CHRONIC PAIN OF LEFT KNEE: ICD-10-CM

## 2025-05-15 DIAGNOSIS — Z96.659 PAIN DUE TO KNEE JOINT PROSTHESIS, INITIAL ENCOUNTER: ICD-10-CM

## 2025-05-15 DIAGNOSIS — T84.84XA PAIN DUE TO KNEE JOINT PROSTHESIS, INITIAL ENCOUNTER: ICD-10-CM

## 2025-05-15 DIAGNOSIS — M25.362 OTHER INSTABILITY, LEFT KNEE: ICD-10-CM

## 2025-05-15 DIAGNOSIS — M25.561 RIGHT KNEE PAIN, UNSPECIFIED CHRONICITY: Primary | ICD-10-CM

## 2025-05-15 LAB
BASOPHILS # BLD: 0.1 K/UL (ref 0–0.2)
BASOPHILS NFR BLD: 0.8 % (ref 0–1)
CRP SERPL-MCNC: <3 MG/L (ref 0–5)
EOSINOPHIL # BLD: 0.2 K/UL (ref 0–0.6)
EOSINOPHIL NFR BLD: 3.5 % (ref 0–5)
ERYTHROCYTE [DISTWIDTH] IN BLOOD BY AUTOMATED COUNT: 13.4 % (ref 11.5–14.5)
ERYTHROCYTE [SEDIMENTATION RATE] IN BLOOD BY WESTERGREN METHOD: 8 MM/HR (ref 0–25)
HCT VFR BLD AUTO: 37.1 % (ref 37–47)
HGB BLD-MCNC: 11.5 G/DL (ref 12–16)
IMM GRANULOCYTES # BLD: 0 K/UL
LYMPHOCYTES # BLD: 2.1 K/UL (ref 1.1–4.5)
LYMPHOCYTES NFR BLD: 31 % (ref 20–40)
MCH RBC QN AUTO: 29 PG (ref 27–31)
MCHC RBC AUTO-ENTMCNC: 31 G/DL (ref 33–37)
MCV RBC AUTO: 93.5 FL (ref 81–99)
MONOCYTES # BLD: 0.7 K/UL (ref 0–0.9)
MONOCYTES NFR BLD: 10.7 % (ref 0–10)
NEUTROPHILS # BLD: 3.6 K/UL (ref 1.5–7.5)
NEUTS SEG NFR BLD: 53.8 % (ref 50–65)
PLATELET # BLD AUTO: 225 K/UL (ref 130–400)
PMV BLD AUTO: 11.3 FL (ref 9.4–12.3)
RBC # BLD AUTO: 3.97 M/UL (ref 4.2–5.4)
WBC # BLD AUTO: 6.6 K/UL (ref 4.8–10.8)

## 2025-05-15 PROCEDURE — G8400 PT W/DXA NO RESULTS DOC: HCPCS | Performed by: PHYSICIAN ASSISTANT

## 2025-05-15 PROCEDURE — G8419 CALC BMI OUT NRM PARAM NOF/U: HCPCS | Performed by: PHYSICIAN ASSISTANT

## 2025-05-15 PROCEDURE — 1090F PRES/ABSN URINE INCON ASSESS: CPT | Performed by: PHYSICIAN ASSISTANT

## 2025-05-15 PROCEDURE — G8427 DOCREV CUR MEDS BY ELIG CLIN: HCPCS | Performed by: PHYSICIAN ASSISTANT

## 2025-05-15 PROCEDURE — 3017F COLORECTAL CA SCREEN DOC REV: CPT | Performed by: PHYSICIAN ASSISTANT

## 2025-05-15 PROCEDURE — 99213 OFFICE O/P EST LOW 20 MIN: CPT | Performed by: PHYSICIAN ASSISTANT

## 2025-05-15 PROCEDURE — 1159F MED LIST DOCD IN RCRD: CPT | Performed by: PHYSICIAN ASSISTANT

## 2025-05-15 PROCEDURE — 1123F ACP DISCUSS/DSCN MKR DOCD: CPT | Performed by: PHYSICIAN ASSISTANT

## 2025-05-15 PROCEDURE — 1036F TOBACCO NON-USER: CPT | Performed by: PHYSICIAN ASSISTANT

## 2025-05-15 RX ORDER — LEVOTHYROXINE SODIUM 88 UG/1
88 TABLET ORAL DAILY
COMMUNITY

## 2025-05-15 RX ORDER — ZOLPIDEM TARTRATE 12.5 MG/1
12.5 TABLET, FILM COATED, EXTENDED RELEASE ORAL NIGHTLY
COMMUNITY

## 2025-05-15 RX ORDER — PRAVASTATIN SODIUM 40 MG
40 TABLET ORAL DAILY
COMMUNITY

## 2025-05-15 RX ORDER — EZETIMIBE 10 MG/1
10 TABLET ORAL NIGHTLY
COMMUNITY

## 2025-05-15 RX ORDER — ASCORBIC ACID 1000 MG
TABLET ORAL
COMMUNITY

## 2025-05-15 RX ORDER — HYDROCODONE BITARTRATE AND ACETAMINOPHEN 10; 325 MG/1; MG/1
TABLET ORAL
COMMUNITY

## 2025-05-15 RX ORDER — MECOBALAMIN 5000 MCG
15 TABLET,DISINTEGRATING ORAL DAILY
COMMUNITY

## 2025-05-15 RX ORDER — ASPIRIN 81 MG/1
81 TABLET ORAL DAILY
COMMUNITY

## 2025-05-15 RX ORDER — PAROXETINE 30 MG/1
30 TABLET, FILM COATED ORAL EVERY MORNING
COMMUNITY

## 2025-05-15 RX ORDER — GABAPENTIN 800 MG/1
800 TABLET ORAL 3 TIMES DAILY
COMMUNITY

## 2025-05-15 ASSESSMENT — ENCOUNTER SYMPTOMS: SHORTNESS OF BREATH: 0

## 2025-05-15 NOTE — PROGRESS NOTES
[facility]  [facility]  [address] [place] [address]  Dept: [phone]  Dept Fax: [phone]  Loc: [phone]     Sasha Reid (:  1955) is a 70 y.o. female,New patient, here for evaluation of the following:    Chief Complaint   Patient presents with    Knee Pain     L knee  Sx: 23 TKR           Subjective   Patient is a 70-year-old  female came to clinic with left knee pain.  She has a history of a left total knee arthroplasty performed on 3/27/2023 with Dr. Liz in [place].  She reports the only time she had relief was 6 weeks after surgery.  After that her son accidentally slapped her knee and then she has had pain ever since.  She has pain up her thigh and down her shin.  She sometimes has hip pain and buttock pain.  She has to rely on walking with a rollator walker.  She has constant pain.    Knee Pain   Pertinent negatives include no numbness.       No Known Allergies  Past Surgical History:   Procedure Laterality Date    BACK SURGERY      CARPAL TUNNEL RELEASE Left     CATARACT EXTRACTION Bilateral      SECTION      HYSTERECTOMY, TOTAL ABDOMINAL (CERVIX REMOVED)      NECK SURGERY      TOTAL KNEE ARTHROPLASTY Left 2023    Dr. Liz     Social History     Tobacco Use    Smoking status: Former     Current packs/day: 0.00     Types: Cigarettes     Quit date:      Years since quitting: 3.3    Smokeless tobacco: Never          Review of Systems   Constitutional:  Negative for fatigue and fever.   Respiratory:  Negative for shortness of breath.    Cardiovascular:  Negative for chest pain.   Musculoskeletal:  Positive for arthralgias. Negative for joint swelling and myalgias.   Skin:  Negative for rash and wound.   Neurological:  Negative for weakness and numbness.   Psychiatric/Behavioral:  Negative for agitation and confusion.           Objective   Physical Exam  Constitutional:

## 2025-05-19 ENCOUNTER — RESULTS FOLLOW-UP (OUTPATIENT)
Age: 70
End: 2025-05-19

## 2025-05-23 ENCOUNTER — HOSPITAL ENCOUNTER (OUTPATIENT)
Dept: CT IMAGING | Age: 70
Discharge: HOME | End: 2025-05-23
Payer: MEDICARE

## 2025-05-23 DIAGNOSIS — M25.562 CHRONIC PAIN OF LEFT KNEE: ICD-10-CM

## 2025-05-23 DIAGNOSIS — M25.362 OTHER INSTABILITY, LEFT KNEE: ICD-10-CM

## 2025-05-23 DIAGNOSIS — T84.84XA PAIN DUE TO KNEE JOINT PROSTHESIS, INITIAL ENCOUNTER: ICD-10-CM

## 2025-05-23 DIAGNOSIS — Z96.659 PAIN DUE TO KNEE JOINT PROSTHESIS, INITIAL ENCOUNTER: ICD-10-CM

## 2025-05-23 DIAGNOSIS — G89.29 CHRONIC PAIN OF LEFT KNEE: ICD-10-CM

## 2025-05-23 PROCEDURE — 73700 CT LOWER EXTREMITY W/O DYE: CPT

## 2025-06-06 ENCOUNTER — TELEPHONE (OUTPATIENT)
Age: 70
End: 2025-06-06

## 2025-06-06 NOTE — TELEPHONE ENCOUNTER
Patient would like to speak to clinic regarding a CT scan for her right knee and getting an order sent to [facility]

## 2025-06-16 NOTE — TELEPHONE ENCOUNTER
Patient is calling back to see if she can get her bone scan order sent over to [facility]   Phone: [phone]  [phone] SECOND [phone]

## 2025-06-17 NOTE — TELEPHONE ENCOUNTER
Returned call to pt, informed her that if the bone scan is done at another facility, that she would be responsible for getting that image on a disc and bringing the report as well. Pt voiced understanding.   Order has been faxed to number provided.

## 2025-07-10 ENCOUNTER — OFFICE VISIT (OUTPATIENT)
Age: 70
End: 2025-07-10
Payer: MEDICARE

## 2025-07-10 VITALS — WEIGHT: 164 LBS | BODY MASS INDEX: 29.06 KG/M2 | HEIGHT: 63 IN

## 2025-07-10 DIAGNOSIS — M25.362 OTHER INSTABILITY, LEFT KNEE: ICD-10-CM

## 2025-07-10 DIAGNOSIS — M25.552 HIP PAIN, CHRONIC, LEFT: Primary | ICD-10-CM

## 2025-07-10 DIAGNOSIS — Z71.2 PERSON CONSULTING FOR EXPLANATION OF EXAMINATION OR TEST FINDING: ICD-10-CM

## 2025-07-10 DIAGNOSIS — Z96.652 PAIN DUE TO TOTAL LEFT KNEE REPLACEMENT, SUBSEQUENT ENCOUNTER: ICD-10-CM

## 2025-07-10 DIAGNOSIS — G89.29 HIP PAIN, CHRONIC, LEFT: Primary | ICD-10-CM

## 2025-07-10 DIAGNOSIS — T84.84XD PAIN DUE TO TOTAL LEFT KNEE REPLACEMENT, SUBSEQUENT ENCOUNTER: ICD-10-CM

## 2025-07-10 PROCEDURE — 1036F TOBACCO NON-USER: CPT | Performed by: PHYSICIAN ASSISTANT

## 2025-07-10 PROCEDURE — G8400 PT W/DXA NO RESULTS DOC: HCPCS | Performed by: PHYSICIAN ASSISTANT

## 2025-07-10 PROCEDURE — G8427 DOCREV CUR MEDS BY ELIG CLIN: HCPCS | Performed by: PHYSICIAN ASSISTANT

## 2025-07-10 PROCEDURE — 1159F MED LIST DOCD IN RCRD: CPT | Performed by: PHYSICIAN ASSISTANT

## 2025-07-10 PROCEDURE — 99213 OFFICE O/P EST LOW 20 MIN: CPT | Performed by: PHYSICIAN ASSISTANT

## 2025-07-10 PROCEDURE — G8419 CALC BMI OUT NRM PARAM NOF/U: HCPCS | Performed by: PHYSICIAN ASSISTANT

## 2025-07-10 PROCEDURE — 1090F PRES/ABSN URINE INCON ASSESS: CPT | Performed by: PHYSICIAN ASSISTANT

## 2025-07-10 PROCEDURE — 3017F COLORECTAL CA SCREEN DOC REV: CPT | Performed by: PHYSICIAN ASSISTANT

## 2025-07-10 PROCEDURE — 1123F ACP DISCUSS/DSCN MKR DOCD: CPT | Performed by: PHYSICIAN ASSISTANT

## 2025-07-10 ASSESSMENT — ENCOUNTER SYMPTOMS: SHORTNESS OF BREATH: 0

## 2025-07-10 NOTE — PROGRESS NOTES
wound.   Neurological:  Negative for weakness and numbness.   Psychiatric/Behavioral:  Negative for agitation and confusion.           Objective   Physical Exam  Constitutional:       General: She is not in acute distress.     Appearance: Normal appearance.   HENT:      Head: Normocephalic.   Pulmonary:      Effort: Pulmonary effort is normal.   Musculoskeletal:      Comments: Upon inspection of the left knee there is no erythema, ecchymosis, deformity.  There is a well-healed surgical incision on the anterior aspect of the knee with no signs of dehiscence or infection.  Range of motion of the knee is limited to 0 degrees of extension to approximately 95 degrees of flexion with pain.  There is extreme tenderness to light touch and pain sensations over the medial and lateral aspects of the knee.  Valgus varus testing reveals a lot of laxity medially and laterally.  Neurovascularly intact distally.   Skin:     Findings: No erythema.   Neurological:      Mental Status: She is alert and oriented to person, place, and time.   Psychiatric:         Mood and Affect: Mood normal.         Thought Content: Thought content normal.             Radiology:  No images completed at this visit.    Ht 1.6 m (5' 3\")   Wt 74.4 kg (164 lb)   BMI 29.05 kg/m²   Body mass index is 29.05 kg/m².         Assessment:  1. Hip pain, chronic, left    2. Other instability, left knee    3. Pain due to total left knee replacement, subsequent encounter    4. Person consulting for explanation of examination or test finding          Plan:     After reviewing CT scan and bone scan I feel the best option would be to follow-up with Dr. Fontaine.  I will bring the images to his attention along with my exam findings.  Conservatively I do feel like patient may need a poly change however with bone scan results it could be a total revision.  Patient may need to return to speak with Dr. Fontaine directly.  Patient and  voiced understanding agree with care

## (undated) DEVICE — CODMAN® SURGICAL PATTIES 1/2" X 3" (1.27CM X 7.62CM): Brand: CODMAN®

## (undated) DEVICE — SUT PROLN 6/0 P1 18IN 8697G

## (undated) DEVICE — PK ENT HD AND NK 30

## (undated) DEVICE — ELECTRD NDL EDGE/INSUL/PFTE.787MM 2.84IN

## (undated) DEVICE — SUT ETHLN 5/0 PS2 18IN 1666G

## (undated) DEVICE — DISPOSABLE BIPOLAR CABLE 12FT. (3.6M): Brand: KIRWAN

## (undated) DEVICE — SPNG DISSCT CHRRY 3/8IN STRL PK/5

## (undated) DEVICE — CVR UNIV C/ARM

## (undated) DEVICE — GLV SURG TRIUMPH PF LTX 6.5 STRL

## (undated) DEVICE — HALTR TRACT HD CERV STD UNIV

## (undated) DEVICE — APPL CHLORAPREP HI/LITE 26ML ORNG

## (undated) DEVICE — PK SPINE CERV ANT 30

## (undated) DEVICE — GLV SURG BIOGEL LTX PF 7 1/2

## (undated) DEVICE — 3.0MM PRECISION NEURO (MATCH HEAD)

## (undated) DEVICE — SOL IRR BSS 15ML STRL

## (undated) DEVICE — GLV SURG DERMASSURE GRN LF PF 8.0

## (undated) DEVICE — GLV SURG BIOGEL LTX PF 6 1/2

## (undated) DEVICE — GLV SURG BIOGEL LTX PF 8

## (undated) DEVICE — PK TURNOVER RM ADV

## (undated) DEVICE — 3M™ STERI-STRIP™ REINFORCED ADHESIVE SKIN CLOSURES, R1547, 1/2 IN X 4 IN (12 MM X 100 MM), 6 STRIPS/ENVELOPE: Brand: 3M™ STERI-STRIP™

## (undated) DEVICE — TP SILK DURAPORE 3IN

## (undated) DEVICE — CLEANGUIDE DISPOSABLE MARKED SPRING TIP GUIDEWIRE, 1.86 MM X 210 CM: Brand: CLEANGUIDE

## (undated) DEVICE — GLV SURG SENSICARE W/ALOE PF LF 7.5 STRL

## (undated) DEVICE — SINGLE-USE BIOPSY FORCEPS: Brand: RADIAL JAW 4

## (undated) DEVICE — PACK,UNIVERSAL,NO GOWNS: Brand: MEDLINE

## (undated) DEVICE — ANTIBACTERIAL UNDYED BRAIDED (POLYGLACTIN 910), SYNTHETIC ABSORBABLE SUTURE: Brand: COATED VICRYL

## (undated) DEVICE — GLV SURG GRN DERMASSURE LF PF 7.5

## (undated) DEVICE — SHEET,DRAPE,53X77,STERILE: Brand: MEDLINE

## (undated) DEVICE — GLV SURG DERMASSURE GRN LF PF 7.0

## (undated) DEVICE — PIN DISTRACT TI 14MM STRL

## (undated) DEVICE — DRAPE,UTILITY,TAPE,15X26,STERILE: Brand: MEDLINE

## (undated) DEVICE — BITEBLOCK ENDO W/STRAP 60F A/ LF DISP

## (undated) DEVICE — 4-PORT MANIFOLD: Brand: NEPTUNE 2

## (undated) DEVICE — 3M™ STERI-DRAPE™ INSTRUMENT POUCH 1018: Brand: STERI-DRAPE™